# Patient Record
Sex: MALE | Race: ASIAN | NOT HISPANIC OR LATINO | Employment: OTHER | URBAN - METROPOLITAN AREA
[De-identification: names, ages, dates, MRNs, and addresses within clinical notes are randomized per-mention and may not be internally consistent; named-entity substitution may affect disease eponyms.]

---

## 2017-03-09 ENCOUNTER — OFFICE VISIT (OUTPATIENT)
Dept: FAMILY MEDICINE | Facility: CLINIC | Age: 62
End: 2017-03-09

## 2017-03-09 VITALS
HEIGHT: 60 IN | SYSTOLIC BLOOD PRESSURE: 138 MMHG | BODY MASS INDEX: 26.86 KG/M2 | WEIGHT: 136.8 LBS | OXYGEN SATURATION: 98 % | TEMPERATURE: 98 F | DIASTOLIC BLOOD PRESSURE: 78 MMHG | HEART RATE: 58 BPM

## 2017-03-09 DIAGNOSIS — Z00.00 HEALTH CARE MAINTENANCE: ICD-10-CM

## 2017-03-09 DIAGNOSIS — K21.9 GASTROESOPHAGEAL REFLUX DISEASE, ESOPHAGITIS PRESENCE NOT SPECIFIED: ICD-10-CM

## 2017-03-09 DIAGNOSIS — L29.89 XEROTIC ECZEMA: Primary | ICD-10-CM

## 2017-03-09 RX ORDER — DIPHENHYDRAMINE HCL 25 MG
25-50 TABLET ORAL
Qty: 60 TABLET | Refills: 1 | Status: SHIPPED | OUTPATIENT
Start: 2017-03-09 | End: 2017-11-10

## 2017-03-09 RX ORDER — TRIAMCINOLONE ACETONIDE 1 MG/G
OINTMENT TOPICAL
Qty: 30 G | Refills: 0 | Status: SHIPPED | OUTPATIENT
Start: 2017-03-09 | End: 2017-11-10

## 2017-03-09 RX ORDER — NICOTINE POLACRILEX 4 MG/1
20 GUM, CHEWING ORAL DAILY
Qty: 30 TABLET | Refills: 0 | Status: CANCELLED | OUTPATIENT
Start: 2017-03-09

## 2017-03-09 RX ORDER — DIAPER,BRIEF,INFANT-TODD,DISP
EACH MISCELLANEOUS
Qty: 30 G | Refills: 0 | Status: CANCELLED | OUTPATIENT
Start: 2017-03-09

## 2017-03-09 NOTE — MR AVS SNAPSHOT
After Visit Summary   3/9/2017    Saw Jagdish    MRN: [id number]           Patient Information     Date Of Birth          1955        Visit Information        Provider Department      3/9/2017 1:10 PM Yenny Rosenberg MD [facility]        Today's Diagnoses     Xerotic eczema    -  1    Gastroesophageal reflux disease, esophagitis presence not specified          Care Instructions    Take zantac every day for the next 2 weeks for acid reflux, then as needed afterward.  Use triamcinolone three times a day for 2 weeks itching on back of neck. Take benadryl at night for itching    Come back for finger lump removal, or in 2 weeks if rash hasn't improved.           Follow-ups after your visit        Who to contact     Please call your clinic at [phone] to:    Ask questions about your health    Make or cancel appointments    Discuss your medicines    Learn about your test results    Speak to your doctor   If you have compliments or concerns about an experience at your clinic, or if you wish to file a complaint, please contact [facility] Patient Relations at [phone] or email us at [email or web address]         Additional Information About Your Visit        MyChart Information     [employer] is an electronic gateway that provides easy, online access to your medical records. With Mpax, you can request a clinic appointment, read your test results, renew a prescription or communicate with your care team.     To sign up for [employer] visit the website at [email or web address]   You will be asked to enter the access code listed below, as well as some personal information. Please follow the directions to create your username and password.     Your access code is: [email or web address]  Expires: 2017  1:45 PM     Your access code will  in 90 days. If you need help or a new code, please contact your [facility] or call  "[phone] for assistance.        Care EveryWhere ID     This is your Care EveryWhere ID. This could be used by other organizations to access your [facility] medical records  [id number]        Your Vitals Were     Pulse Temperature Height Pulse Oximetry BMI (Body Mass Index)       58 98  F (36.7  C) (Oral) 4' 11.5\" (151.1 cm) 98% 27.17 kg/m2        Blood Pressure from Last 3 Encounters:   03/09/17 138/78   05/10/16 109/67   04/08/16 97/63    Weight from Last 3 Encounters:   03/09/17 136 lb 12.8 oz (62.1 kg)   05/10/16 135 lb 6.4 oz (61.4 kg)   04/08/16 138 lb (62.6 kg)              Today, you had the following     No orders found for display         Today's Medication Changes          These changes are accurate as of: 3/9/17  1:45 PM.  If you have any questions, ask your nurse or doctor.               Start taking these medicines.        Dose/Directions    diphenhydrAMINE 25 MG tablet   Commonly known as:  BENADRYL   Used for:  Xerotic eczema   Started by:  Yenny Rosenberg MD        Dose:  25-50 mg   Take 1-2 tablets (25-50 mg) by mouth nightly as needed for itching or allergies   Quantity:  60 tablet   Refills:  1       ranitidine 150 MG tablet   Commonly known as:  ZANTAC   Used for:  Gastroesophageal reflux disease, esophagitis presence not specified   Started by:  Yenny Rosenberg MD        Dose:  150 mg   Take 1 tablet (150 mg) by mouth At Bedtime   Quantity:  60 tablet   Refills:  1       triamcinolone 0.1 % ointment   Commonly known as:  KENALOG   Used for:  Xerotic eczema   Started by:  Yenny Rosenberg MD        Apply sparingly to affected area three times daily for 14 days.   Quantity:  30 g   Refills:  0            Where to get your medicines      [facility] [place], [place] - [address], [place] [address]     Phone:  [phone]     diphenhydrAMINE 25 MG tablet    ranitidine 150 MG tablet    triamcinolone 0.1 % ointment "                Primary Care Provider Office Phone # Fax #    Leida Neal [phone]       [address] [address]        Thank you!     Thank you for choosing [facility]  for your care. Our goal is always to provide you with excellent care. Hearing back from our patients is one way we can continue to improve our services. Please take a few minutes to complete the written survey that you may receive in the mail after your visit with us. Thank you!             Your Updated Medication List - Protect others around you: Learn how to safely use, store and throw away your medicines at www.disposemymeds.org.          This list is accurate as of: 3/9/17  1:45 PM.  Always use your most recent med list.                   Brand Name Dispense Instructions for use    diphenhydrAMINE 25 MG tablet    BENADRYL    60 tablet    Take 1-2 tablets (25-50 mg) by mouth nightly as needed for itching or allergies       omeprazole 20 MG tablet     30 tablet    Take 1 tablet (20 mg) by mouth daily Take 30-60 minutes before a meal.       ranitidine 150 MG tablet    ZANTAC    60 tablet    Take 1 tablet (150 mg) by mouth At Bedtime       triamcinolone 0.1 % ointment    KENALOG    30 g    Apply sparingly to affected area three times daily for 14 days.

## 2017-03-09 NOTE — PATIENT INSTRUCTIONS
Take zantac every day for the next 2 weeks for acid reflux, then as needed afterward.  Use triamcinolone three times a day for 2 weeks itching on back of neck. Take benadryl at night for itching  Labs today    Come back for finger lump removal, or in 2 weeks if rash hasn't improved.

## 2017-03-09 NOTE — PROGRESS NOTES
Preceptor attestation:  Patient seen and discussed with the resident. Assessment and plan reviewed with resident and agreed upon.  Supervising physician: Angel Garcia  [facility]

## 2017-03-09 NOTE — PROGRESS NOTES
"     SUBJECTIVE     Chief Complaint   Patient presents with     other     have itching skin on neck for a while now per pt.      other     have stomch problem and want to be back on the medication per pt.        Subjective: Saw Jagdish is a 62 year old male with GERD here for neck itchiness.    Itchy back of neck. Associates this with certain foods such as meat. Has been going for 3 years, slowly getting worse over the last 3 years. Is associated with a rash. No trauma or burn to the area. He bought OTC medicine for ringworm, this hasn't helped. No traditional medicine use. No rashes elsewhere. Does not wear necklace    Also wants a refill for stomach medicine. Was Rx omeprazole and loratadine 1 year ago for current cough, hasn't taken it in over a year. Current symptoms include irritation after eating and a sour taste in his mouth after burping. Currently no cough, except for cold weather. Currently not taking any medication. No nasal drip or congestion.     He also is noted to have a flesh-colored nodule on his L 3rd finger. States this has been present since he was a child and is sometimes irritated. He would like to get this removed.    ROS:    General: No fevers   Skin: No new rashes   CV: No chest pain   Resp: No shortness of breath     PMH/PSH, FamHx, Meds, Allergies reviewed and updated as needed.    Social History     Social History     Marital status:      Spouse name: N/A     Number of children: N/A     Years of education: N/A     Social History Main Topics     Smoking status: Never Smoker     Smokeless tobacco: Never Used     Alcohol use No     Drug use: No     Sexual activity: Not Asked     Other Topics Concern     None     Social History Narrative           OBJECTIVE     /78  Pulse 58  Temp 98  F (36.7  C) (Oral)  Ht 4' 11.5\" (151.1 cm)  Wt 136 lb 12.8 oz (62.1 kg)  SpO2 98%  BMI 27.17 kg/m2  Body mass index is 27.17 kg/(m^2).    Physical exam:  Gen: No acute distress  HEENT: No pharyngeal " erythema or post nasal drip  Skin: Hyperpigmented patch with xerosis and overlying small pustules and excoriations on the dorsal neck.. No similar rash on upper extremities. Also has 1 cm pedunculated flesh colored mass on L 3rd finger just distal to MCP that does not move with finger flexion extension  CV: Regular rate and rhythm, no murmurs/rubs/gallops  Resp: Clear to auscultation bilaterally, no crackles or wheezes  Psych: Mood and affect appropriate, mentation appears normal.    No results found for this or any previous visit (from the past 24 hour(s)).      ASSESSMENT AND PLAN     Saw Jagdish is a 62 year old male here for eczema and GERD    1. Gastroesophageal reflux disease, esophagitis presence not specified  Hasn't used GERD medicine in over 1 year. Will try H2 blocker first to see if this provides symptomatic relief.   - ranitidine (ZANTAC) 150 MG tablet; Take 1 tablet (150 mg) by mouth At Bedtime  Dispense: 60 tablet; Refill: 1    2. Xerotic eczema  No history to suggest nickel allergy. No other rashes reported or noted on limited exam. Discussed to try triamcinolone for 2 weeks and to come back if this doesn't improve symptoms, and risks of using steroid creams for prolonged times. Sx have been keeping him up at night, will additionally Rx benadryl.   - diphenhydrAMINE (BENADRYL) 25 MG tablet; Take 1-2 tablets (25-50 mg) by mouth nightly as needed for itching or allergies  Dispense: 60 tablet; Refill: 1  - triamcinolone (KENALOG) 0.1 % ointment; Apply sparingly to affected area three times daily for 14 days.  Dispense: 30 g; Refill: 0    3. Health care maintenance  Due for hepatitis C testing. Hasn't done this in the past.  [facility]    4. L 3rd finger nodule  Small skin growth, does not appear to be attached to any tendon or underlying finger structures. Would likely be an easy removal perhaps requiring a suture for bleeding. Encouraged him to schedule a follow up appointment at  his convenience for removal.     RTC in 2 weeks for follow up of neck itchiness or removal of finger nodule, or sooner if develops new or worsening symptoms.      Yenny Rosenberg MD, PGY-1  I precepted today with Angel Garcia MD.

## 2017-03-09 NOTE — LETTER
March 13, 2017      Saw Jagdish  [address] [place] [address]        Dear Saw,    Please see below for your test results.    Resulted Orders   Hepatitis C Antibody ([employer])   Result Value Ref Range    Hepatitis C Antibody Screen Negative Negative    Narrative    Test performed by:  [facility]  [address] [place] [address]     Lisbet Saw Jagdish,  Your blood test checking for Hepatitis C was normal. You do NOT have hepatitis C.   Make an appointment whenever you want for a removal for the lump on your finger.  Dr. Yenny Rosenberg

## 2017-03-09 NOTE — NURSING NOTE
name: Corby Khoury  Language: Meaghan  Agency: [facility] [place], operated [facility]  Phone number: [phone]

## 2017-03-10 LAB — HCV AB SER QL: NEGATIVE

## 2017-03-27 ENCOUNTER — OFFICE VISIT (OUTPATIENT)
Dept: FAMILY MEDICINE | Facility: CLINIC | Age: 62
End: 2017-03-27

## 2017-03-27 VITALS
HEIGHT: 60 IN | SYSTOLIC BLOOD PRESSURE: 107 MMHG | HEART RATE: 63 BPM | WEIGHT: 135.6 LBS | TEMPERATURE: 98.6 F | BODY MASS INDEX: 26.62 KG/M2 | OXYGEN SATURATION: 97 % | DIASTOLIC BLOOD PRESSURE: 69 MMHG

## 2017-03-27 DIAGNOSIS — L91.8 SKIN TAG: Primary | ICD-10-CM

## 2017-03-27 NOTE — NURSING NOTE
name: Corby Khoury  Language: Meaghan  Agency: [facility]  Phone number: [phone]

## 2017-03-27 NOTE — PROGRESS NOTES
SUBJECTIVE:   Manpreet Dubon is a 62 year old male who presents today for skin tag excision. He has 1 skin tag on the dorsum of his left 3rd digit which has been present since birth that he would like removed. This was previously discussed during visit with Dr. Rosenberg. Its become irritated by rubbing from clothing at times      OBJECTIVE:   Exam shows a 1 skin tag on the dorsum of the base of the left 3rd digit. There is not evidence of irritation with surrounding redness at this time. It is 0.2 cm in diameter at the base.      Discussed with Manpreet regarding technique, risk of infection, and scarring. Consent was signed and time-out performed prior to procedure. Alcohol was used for cleansing. 1 cc of Lidocaine without epinephrine was used for anesthesia. Skin tags removed by shave excision. Bandage applied with steri-strips with minimal bleeding. Saw tolerated procedure well. No complications.    ASSESSMENT:   1 skin tag excision.    PLAN:   Specimens not submitted to pathology. Wound care instructions given. Follow up as needed.      Sofiya Irvin MD - PGY-3  [facility]    Patient seen and plan of care discussed with preceptor, Dr. Frances who was present for entire procedure

## 2017-03-27 NOTE — PROGRESS NOTES
Preceptor attestation:  Patient seen and discussed with the resident. I examined patient, was present for and supervised the entire procedure.  Assessment and plan reviewed with resident and agreed upon.  Supervising physician: Gregoria Frances  [facility]

## 2017-03-27 NOTE — MR AVS SNAPSHOT
"              After Visit Summary   3/27/2017    Saw Jagdish    MRN: [id number]           Patient Information     Date Of Birth          1955        Visit Information        Provider Department      3/27/2017 10:00 AM Sofiya Irvin MD [facility]        Today's Diagnoses     Skin tag    -  1       Follow-ups after your visit        Who to contact     Please call your clinic at [phone] to:    Ask questions about your health    Make or cancel appointments    Discuss your medicines    Learn about your test results    Speak to your doctor   If you have compliments or concerns about an experience at your clinic, or if you wish to file a complaint, please contact [facility] Patient Relations at [phone] or email us at [email or web address]         Additional Information About Your Visit        MyChart Information     [employer] is an electronic gateway that provides easy, online access to your medical records. With [employer], you can request a clinic appointment, read your test results, renew a prescription or communicate with your care team.     To sign up for [employer] visit the website at [email or web address]   You will be asked to enter the access code listed below, as well as some personal information. Please follow the directions to create your username and password.     Your access code is: [email or web address]  Expires: 2017  2:45 PM     Your access code will  in 90 days. If you need help or a new code, please contact your [facility] or call [phone] for assistance.        Care EveryWhere ID     This is your Care EveryWhere ID. This could be used by other organizations to access your [facility] medical records  [id number]        Your Vitals Were     Pulse Temperature Height Pulse Oximetry BMI (Body Mass Index)       63 98.6  F (37  C) 4' 11.45\" (151 cm) 97% 26.98 kg/m2        Blood Pressure from Last 3 Encounters: "   03/27/17 107/69   03/09/17 138/78   05/10/16 109/67    Weight from Last 3 Encounters:   03/27/17 135 lb 9.6 oz (61.5 kg)   03/09/17 136 lb 12.8 oz (62.1 kg)   05/10/16 135 lb 6.4 oz (61.4 kg)              We Performed the Following     Removal of up to 15 skin tags        Primary Care Provider Office Phone # Fax #    Leida Neal [phone]       [address] [address]        Thank you!     Thank you for choosing [facility]  for your care. Our goal is always to provide you with excellent care. Hearing back from our patients is one way we can continue to improve our services. Please take a few minutes to complete the written survey that you may receive in the mail after your visit with us. Thank you!             Your Updated Medication List - Protect others around you: Learn how to safely use, store and throw away your medicines at www.disposemymeds.org.          This list is accurate as of: 3/27/17  2:41 PM.  Always use your most recent med list.                   Brand Name Dispense Instructions for use    diphenhydrAMINE 25 MG tablet    BENADRYL    60 tablet    Take 1-2 tablets (25-50 mg) by mouth nightly as needed for itching or allergies       omeprazole 20 MG tablet     30 tablet    Take 1 tablet (20 mg) by mouth daily Take 30-60 minutes before a meal.       ranitidine 150 MG tablet    ZANTAC    60 tablet    Take 1 tablet (150 mg) by mouth At Bedtime       triamcinolone 0.1 % ointment    KENALOG    30 g    Apply sparingly to affected area three times daily for 14 days.

## 2017-06-26 DIAGNOSIS — K21.9 GASTROESOPHAGEAL REFLUX DISEASE, ESOPHAGITIS PRESENCE NOT SPECIFIED: ICD-10-CM

## 2017-08-17 ENCOUNTER — OFFICE VISIT (OUTPATIENT)
Dept: FAMILY MEDICINE | Facility: CLINIC | Age: 62
End: 2017-08-17

## 2017-08-17 VITALS
DIASTOLIC BLOOD PRESSURE: 78 MMHG | WEIGHT: 134.6 LBS | HEART RATE: 65 BPM | SYSTOLIC BLOOD PRESSURE: 116 MMHG | BODY MASS INDEX: 26.42 KG/M2 | TEMPERATURE: 97.5 F | HEIGHT: 60 IN

## 2017-08-17 DIAGNOSIS — G89.29 CHRONIC BILATERAL LOW BACK PAIN WITH BILATERAL SCIATICA: Primary | ICD-10-CM

## 2017-08-17 DIAGNOSIS — M54.41 CHRONIC BILATERAL LOW BACK PAIN WITH BILATERAL SCIATICA: Primary | ICD-10-CM

## 2017-08-17 DIAGNOSIS — M54.42 CHRONIC BILATERAL LOW BACK PAIN WITH BILATERAL SCIATICA: Primary | ICD-10-CM

## 2017-08-17 RX ORDER — GABAPENTIN 300 MG/1
300 CAPSULE ORAL 2 TIMES DAILY
Qty: 60 CAPSULE | Refills: 1 | Status: SHIPPED | OUTPATIENT
Start: 2017-08-17 | End: 2017-09-11

## 2017-08-17 ASSESSMENT — PATIENT HEALTH QUESTIONNAIRE - PHQ9: SUM OF ALL RESPONSES TO PHQ QUESTIONS 1-9: 3

## 2017-08-17 NOTE — PATIENT INSTRUCTIONS
"Long standing lower back pain.  Both sides.  Can radiate down legs (both).  Worse standing or sitting for longer while.  \"Antunez\".  Associated with Headache  Stomach senativities.    1) Physical Therapy    2) Tylenol, as needed    3) Restart gabapentin.  Will help over 1-2 weeks.  Decreases \"Burn\"  Just take at night if morning dose making you tired      Recheck in 4-6 weeks    Your referral [facility] been scheduled for:    [facility]  [address],  [address],   [place] [address]  Physical Therapy  Date: Wednesday August 30 2017   Time: 1:45 PM     If you have any questions or need to reschedule please call the number listed above.  Any other concerns please call our referral coordinator at [phone]    Le  Care Coordinator     Gave to basim ontiveros   "

## 2017-08-17 NOTE — PROGRESS NOTES
"       SUBJECTIVE       Saw Jagdish is a 62 year old  male with a PMH significant for:     Patient Active Problem List   Diagnosis     GERD (gastroesophageal reflux disease)     Back pain     Urinary frequency     Beta-thalassemia (H)     Neuropathy (H)     He presents with Long standing lower back pain.  Both sides.  Can radiate down legs (both).  Worse standing or sitting for longer while.  \"Antunez\".  Associated with Headache  Stomach senativities. .    PMH, Medications and Allergies were reviewed and updated as needed.        REVIEW OF SYSTEMS     General: No fevers, chills, sweats, unexplained weight loss  Head: Some headache, when back hurts  Neck: No swallowing problems   CV: No chest pain or palpitations  Resp: No shortness of breath.  No cough. No hemoptysis.  GI: No constipation, diarrhea, or blood in stool.  no nausea or vomiting  : No pain passing urine or urinary frequency            OBJECTIVE     Vitals:    08/17/17 0826   BP: 116/78   Pulse: 65   Temp: 97.5  F (36.4  C)   TempSrc: Oral   Weight: 134 lb 9.6 oz (61.1 kg)   Height: 4' 11.5\" (151.1 cm)     Body mass index is 26.73 kg/(m^2).    Gen:  Well nourished and in NAD  HEENT: PERRLA; TMs normal color and landmarks; nasopharynx pink and moist; oropharynx pink and moist  Neck: supple without lymphadenopathy  CV:  RRR  - no murmurs, rubs, or gallups,   Pulm:  CTAB, no wheezes/rales/rhonchi, good air entry   ABD: soft, nontender, no masses, no rebound, BS intact throughout  Back: paraspinal tenderness and mild spasm  Extrem: 2+ symmetric patella reflexes. Dorsal/plantar foot/toe strength normal.  Psych: Euthymic     No results found for this or any previous visit (from the past 24 hour(s)).        ASSESSMENT AND PLAN     Manpreet was seen today for back pain, headache and musculoskeletal problem.    Diagnoses and all orders for this visit:    Chronic bilateral low back pain with bilateral sciatica  -     gabapentin (NEURONTIN) 300 MG capsule; Take 1 capsule " "(300 mg) by mouth 2 times daily  -     PHYSICAL THERAPY REFERRAL        Patient Instructions   Long standing lower back pain.  Both sides.  Can radiate down legs (both).  Worse standing or sitting for longer while.  \"Antunez\".  Associated with Headache  Stomach senativities.    1) Physical Therapy    2) Tylenol, as needed    3) Restart gabapentin.  Will help over 1-2 weeks.  Decreases \"Burn\"  Just take at night if morning dose making you tired      Recheck in 4-6 weeks      Total of 30 minutes was spent in face to face contact with patient with > 50% in counseling and coordination of care.  Options for treatment and/or follow-up care were reviewed with the patient. Saw Jagdish was engaged and actively involved in the decision making process. He verbalized understanding of the options discussed and was satisfied with the final plan.    RTC in 6 weeks for follow up of pain or sooner if develops new or worsening symptoms.    Riley Purdy MD        "

## 2017-08-17 NOTE — MR AVS SNAPSHOT
"              After Visit Summary   8/17/2017    Saw Jagdish    MRN: [id number]           Patient Information     Date Of Birth          1955        Visit Information        Provider Department      8/17/2017 8:20 AM Riley Purdy MD [facility]        Today's Diagnoses     Chronic bilateral low back pain with bilateral sciatica    -  1      Care Instructions    Long standing lower back pain.  Both sides.  Can radiate down legs (both).  Worse standing or sitting for longer while.  \"Antunez\".  Associated with Headache  Stomach senativities.    1) Physical Therapy    2) Tylenol, as needed    3) Restart gabapentin.  Will help over 1-2 weeks.  Decreases \"Burn\"  Just take at night if morning dose making you tired      Recheck in 4-6 weeks          Follow-ups after your visit        Additional Services     PHYSICAL THERAPY REFERRAL       PT/OT REFERRAL  Saw Jagdish  1955  Phone #: [phone] (home)    needed: Yes  Language: Meaghan    PT/OT  Facility:   Any site    History: Long standing lower back pain.  Both sides.  Can radiate down legs (both).  Worse standing or sitting for longer while.  \"Antunez\".  Associated with Headache  Stomach senativities    Precautions/Contraindications: None    Imaging Studies: None    Surgical Procedure/Test Results: None    Treatment Goals:   Increase: Flexibility and Strength    Prognosis: good    Visits: Up to 12    Evaluate: Evaluate and treat    Plan: Manual Therapy and Flexibility Exercise    {PV IONTO/PHONOPHORESIS:[id number]                  Who to contact     Please call your clinic at [phone] to:    Ask questions about your health    Make or cancel appointments    Discuss your medicines    Learn about your test results    Speak to your doctor   If you have compliments or concerns about an experience at your clinic, or if you wish to file a complaint, please contact [facility] Patient Relations at [phone] or email us at " "[email or web address]         Additional Information About Your Visit        [employer] Information     Vapps is an electronic gateway that provides easy, online access to your medical records. With Vapps, you can request a clinic appointment, read your test results, renew a prescription or communicate with your care team.     To sign up for Vapps visit the website at [email or web address]   You will be asked to enter the access code listed below, as well as some personal information. Please follow the directions to create your username and password.     Your access code is: [email or web address]  Expires: 11/15/2017  8:55 AM     Your access code will  in 90 days. If you need help or a new code, please contact your [facility] or call [phone] for assistance.        Care EveryWhere ID     This is your Care EveryWhere ID. This could be used by other organizations to access your [facility] medical records  [id number]        Your Vitals Were     Pulse Temperature Height BMI (Body Mass Index)          65 97.5  F (36.4  C) (Oral) 4' 11.5\" (151.1 cm) 26.73 kg/m2         Blood Pressure from Last 3 Encounters:   17 116/78   17 107/69   17 138/78    Weight from Last 3 Encounters:   17 134 lb 9.6 oz (61.1 kg)   17 135 lb 9.6 oz (61.5 kg)   17 136 lb 12.8 oz (62.1 kg)              We Performed the Following     PHYSICAL THERAPY REFERRAL          Today's Medication Changes          These changes are accurate as of: 17  8:55 AM.  If you have any questions, ask your nurse or doctor.               Start taking these medicines.        Dose/Directions    gabapentin 300 MG capsule   Commonly known as:  NEURONTIN   Used for:  Chronic bilateral low back pain with bilateral sciatica   Started by:  Riley Purdy MD        Dose:  300 mg   Take 1 capsule (300 mg) by mouth 2 times daily   Quantity:  60 capsule   Refills:  1            Where to get " your medicines      These medications were sent to [facility] - [place], [place] - [address]  [address], [place] [address]     Phone:  [phone]     gabapentin 300 MG capsule                Primary Care Provider Office Phone # Fax #    Leida Neal [phone]       [facility] [address] [address]        Equal Access to Services     QUANG TOSCANO : Hadii aad ku hadasho Soomaali, waaxda luqadaha, qaybta kaalmada adeegyada, waxay idiin hayaan adeeg nunumeagannely laambika villa. So [facility] [phone].    ATENCIÓN: Si dusty demarco, tiene a matias disposición servicios gratuitos de asistencia lingüística. Corrina al [phone].    We comply with applicable federal civil rights laws and [place] laws. We do not discriminate on the basis of race, color, national origin, age, disability sex, sexual orientation or gender identity.            Thank you!     Thank you for choosing [facility]  for your care. Our goal is always to provide you with excellent care. Hearing back from our patients is one way we can continue to improve our services. Please take a few minutes to complete the written survey that you may receive in the mail after your visit with us. Thank you!             Your Updated Medication List - Protect others around you: Learn how to safely use, store and throw away your medicines at www.disposemymeds.org.          This list is accurate as of: 8/17/17  8:55 AM.  Always use your most recent med list.                   Brand Name Dispense Instructions for use Diagnosis    diphenhydrAMINE 25 MG tablet    BENADRYL    60 tablet    Take 1-2 tablets (25-50 mg) by mouth nightly as needed for itching or allergies    Xerotic eczema       gabapentin 300 MG capsule    NEURONTIN    60 capsule    Take 1 capsule (300 mg) by mouth 2 times daily    Chronic bilateral low back pain with bilateral sciatica       omeprazole 20 MG tablet     30 tablet    Take 1  tablet (20 mg) by mouth daily Take 30-60 minutes before a meal.    Gastroesophageal reflux disease, esophagitis presence not specified       ranitidine 150 MG tablet    ZANTAC    60 tablet    Take 1 tablet (150 mg) by mouth At Bedtime    Gastroesophageal reflux disease, esophagitis presence not specified       triamcinolone 0.1 % ointment    KENALOG    30 g    Apply sparingly to affected area three times daily for 14 days.    Xerotic eczema

## 2017-09-11 ENCOUNTER — OFFICE VISIT (OUTPATIENT)
Dept: FAMILY MEDICINE | Facility: CLINIC | Age: 62
End: 2017-09-11

## 2017-09-11 VITALS
SYSTOLIC BLOOD PRESSURE: 96 MMHG | BODY MASS INDEX: 26.9 KG/M2 | TEMPERATURE: 97.4 F | DIASTOLIC BLOOD PRESSURE: 59 MMHG | WEIGHT: 137 LBS | HEIGHT: 60 IN | HEART RATE: 66 BPM

## 2017-09-11 DIAGNOSIS — M54.41 CHRONIC BILATERAL LOW BACK PAIN WITH BILATERAL SCIATICA: Primary | ICD-10-CM

## 2017-09-11 DIAGNOSIS — M54.42 CHRONIC BILATERAL LOW BACK PAIN WITH BILATERAL SCIATICA: Primary | ICD-10-CM

## 2017-09-11 DIAGNOSIS — G89.29 CHRONIC BILATERAL LOW BACK PAIN WITH BILATERAL SCIATICA: Primary | ICD-10-CM

## 2017-09-11 RX ORDER — GABAPENTIN 300 MG/1
600 CAPSULE ORAL AT BEDTIME
Qty: 60 CAPSULE | Refills: 11 | Status: SHIPPED | OUTPATIENT
Start: 2017-09-11 | End: 2019-06-17

## 2017-09-11 NOTE — NURSING NOTE
name: Corby Khoury  Language: Meaghan  Agency: [facility] [place]  Phone number: [phone]

## 2017-09-12 NOTE — PROGRESS NOTES
"       SUBJECTIVE       Saw Jagdish is a 62 year old  male with a PMH significant for:     Patient Active Problem List   Diagnosis     GERD (gastroesophageal reflux disease)     Back pain     Urinary frequency     Beta-thalassemia (H)     Neuropathy (H)     He presents with Long standing lower back pain.  Both sides.  Can radiate down legs (both).  Worse standing or sitting for longer while.  \"Antunez\".   Doing somewhat better.  Less burning.  Tired though.    PMH, Medications and Allergies were reviewed and updated as needed.        REVIEW OF SYSTEMS     General: No fevers, chills, sweats, unexplained weight loss  Head: Some headache, when back hurts  Neck: No swallowing problems   CV: No chest pain or palpitations  Resp: No shortness of breath.  No cough. No hemoptysis.  GI: No constipation, diarrhea, or blood in stool.  no nausea or vomiting  : No pain passing urine or urinary frequency            OBJECTIVE     Vitals:    09/11/17 1115   BP: 96/59   Pulse: 66   Temp: 97.4  F (36.3  C)   TempSrc: Oral   Weight: 137 lb (62.1 kg)   Height: 4' 11.75\" (151.8 cm)     Body mass index is 26.98 kg/(m^2).    Gen:  Well nourished and in NAD  HEENT: PERRLA; TMs normal color and landmarks; nasopharynx pink and moist; oropharynx pink and moist  Neck: supple without lymphadenopathy  CV:  RRR  - no murmurs, rubs, or gallups,   Pulm:  CTAB, no wheezes/rales/rhonchi, good air entry   ABD: soft, nontender, no masses, no rebound, BS intact throughout  Back: paraspinal tenderness and mild spasm  Extrem: 2+ symmetric patella reflexes. Dorsal/plantar foot/toe strength normal.  Psych: Euthymic     No results found for this or any previous visit (from the past 24 hour(s)).        ASSESSMENT AND PLAN     Manpreet was seen today for recheck.    Diagnoses and all orders for this visit:    Chronic bilateral low back pain with bilateral sciatica  -     gabapentin (NEURONTIN) 300 MG capsule; Take 2 capsules (600 mg) by mouth At Bedtime        Patient " Instructions   Doing somewhat better.  Less burning.  Tired though.    1) Take both capsules together at bedtime      Total of 15 minutes was spent in face to face contact with patient with > 50% in counseling and coordination of care.  Options for treatment and/or follow-up care were reviewed with the patient. Saw Jagdish was engaged and actively involved in the decision making process. He verbalized understanding of the options discussed and was satisfied with the final plan.    RTC in 3 months  Riley Purdy MD

## 2017-10-31 DIAGNOSIS — K21.9 GASTROESOPHAGEAL REFLUX DISEASE, ESOPHAGITIS PRESENCE NOT SPECIFIED: ICD-10-CM

## 2017-11-10 ENCOUNTER — OFFICE VISIT (OUTPATIENT)
Dept: FAMILY MEDICINE | Facility: CLINIC | Age: 62
End: 2017-11-10

## 2017-11-10 VITALS
DIASTOLIC BLOOD PRESSURE: 66 MMHG | BODY MASS INDEX: 26.97 KG/M2 | TEMPERATURE: 97.8 F | WEIGHT: 137.4 LBS | OXYGEN SATURATION: 98 % | SYSTOLIC BLOOD PRESSURE: 108 MMHG | HEIGHT: 60 IN | HEART RATE: 62 BPM

## 2017-11-10 DIAGNOSIS — G89.29 CHRONIC BILATERAL LOW BACK PAIN WITH BILATERAL SCIATICA: Primary | ICD-10-CM

## 2017-11-10 DIAGNOSIS — Z23 NEED FOR VACCINATION: ICD-10-CM

## 2017-11-10 DIAGNOSIS — M54.42 CHRONIC BILATERAL LOW BACK PAIN WITH BILATERAL SCIATICA: Primary | ICD-10-CM

## 2017-11-10 DIAGNOSIS — K21.9 GASTROESOPHAGEAL REFLUX DISEASE, ESOPHAGITIS PRESENCE NOT SPECIFIED: ICD-10-CM

## 2017-11-10 DIAGNOSIS — M54.41 CHRONIC BILATERAL LOW BACK PAIN WITH BILATERAL SCIATICA: Primary | ICD-10-CM

## 2017-11-10 RX ORDER — NICOTINE POLACRILEX 4 MG/1
20 GUM, CHEWING ORAL DAILY
Qty: 90 TABLET | Refills: 3 | Status: SHIPPED | OUTPATIENT
Start: 2017-11-10 | End: 2019-07-29

## 2017-11-10 NOTE — PATIENT INSTRUCTIONS
Reports sleep and appetite good.  Pains occur on/off.  Can shoot down either leg.    1) Flu shot  2) Continue gabapentin at bedtime    Recheck in 3 months

## 2017-11-10 NOTE — MR AVS SNAPSHOT
After Visit Summary   11/10/2017    Saw Jagdish    MRN: [id number]           Patient Information     Date Of Birth          1955        Visit Information        Provider Department      11/10/2017 11:00 AM Riley Purdy MD [facility]        Today's Diagnoses     Chronic bilateral low back pain with bilateral sciatica    -  1    Gastroesophageal reflux disease, esophagitis presence not specified          Care Instructions    Reports sleep and appetite good.  Pains occur on/off.  Can shoot down either leg.    1) Flu shot  2) Continue gabapentin at bedtime    Recheck in 3 months          Follow-ups after your visit        Who to contact     Please call your clinic at [phone] to:    Ask questions about your health    Make or cancel appointments    Discuss your medicines    Learn about your test results    Speak to your doctor   If you have compliments or concerns about an experience at your clinic, or if you wish to file a complaint, please contact [facility] Patient Relations at [phone] or email us at [email or web address]         Additional Information About Your Visit        MyChart Information     [email or web address].com is an electronic gateway that provides easy, online access to your medical records. With [email or web address].com, you can request a clinic appointment, read your test results, renew a prescription or communicate with your care team.     To sign up for [email or web address] visit the website at [email or web address]   You will be asked to enter the access code listed below, as well as some personal information. Please follow the directions to create your username and password.     Your access code is: [email or web address]  Expires: 11/15/2017  7:55 AM     Your access code will  in 90 days. If you need help or a new code, please contact your [facility] or call [phone] for assistance.        Care EveryWhere ID     This is your Care EveryWhere  "ID. This could be used by other organizations to access your [place] medical records  [id number]        Your Vitals Were     Pulse Temperature Height Pulse Oximetry BMI (Body Mass Index)       62 97.8  F (36.6  C) (Oral) 4' 11.25\" (150.5 cm) 98% 27.52 kg/m2        Blood Pressure from Last 3 Encounters:   11/10/17 108/66   09/11/17 96/59   08/17/17 116/78    Weight from Last 3 Encounters:   11/10/17 137 lb 6.4 oz (62.3 kg)   09/11/17 137 lb (62.1 kg)   08/17/17 134 lb 9.6 oz (61.1 kg)              Today, you had the following     No orders found for display         Today's Medication Changes          These changes are accurate as of: 11/10/17 11:18 AM.  If you have any questions, ask your nurse or doctor.               Stop taking these medicines if you haven't already. Please contact your care team if you have questions.     diphenhydrAMINE 25 MG tablet   Commonly known as:  BENADRYL   Stopped by:  Riley Purdy MD           triamcinolone 0.1 % ointment   Commonly known as:  KENALOG   Stopped by:  Riley Purdy MD                    Primary Care Provider Office Phone # Fax #    Leida Durant DO [place] [phone]       [place] [address]        Equal Access to Services     QUANG TOSCANO AH: Hadii patricia tran hadasho Sogordyali, waaxda luqadaha, qaybta kaalmada adeegyada, tran villa. So [facility] [phone].    ATENCIÓN: Si habla español, tiene a matias disposición servicios gratuitos de asistencia lingüística. Corrina al [phone].    We comply with applicable federal civil rights laws and [place] laws. We do not discriminate on the basis of race, color, national origin, age, disability, sex, sexual orientation, or gender identity.            Thank you!     Thank you for choosing [facility]  for your care. Our goal is always to provide you with excellent care. Hearing back from our patients is one way we can continue to improve our " services. Please take a few minutes to complete the written survey that you may receive in the mail after your visit with us. Thank you!             Your Updated Medication List - Protect others around you: Learn how to safely use, store and throw away your medicines at www.disposemymeds.org.          This list is accurate as of: 11/10/17 11:18 AM.  Always use your most recent med list.                   Brand Name Dispense Instructions for use Diagnosis    gabapentin 300 MG capsule    NEURONTIN    60 capsule    Take 2 capsules (600 mg) by mouth At Bedtime    Chronic bilateral low back pain with bilateral sciatica       omeprazole 20 MG tablet     30 tablet    Take 1 tablet (20 mg) by mouth daily Take 30-60 minutes before a meal.    Gastroesophageal reflux disease, esophagitis presence not specified       ranitidine 150 MG tablet    ZANTAC    60 tablet    Take 1 tablet (150 mg) by mouth At Bedtime    Gastroesophageal reflux disease, esophagitis presence not specified

## 2017-11-10 NOTE — NURSING NOTE
" name: Corby Khoury  Language: Meaghan  Agency: [employer]  Phone number: [phone]      Injectable Influenza Immunization Documentation    1.  Has the patient received the information for the injectable influenza vaccine? YES     2. Is the patient 6 months of age or older? YES     3. Does the patient have any of the following contraindications?         Severe allergy to eggs? No     Severe allergic reaction to previous influenza vaccines? No   Severe allergy to latex? No       History of Guillain-Dameron syndrome? No     Currently have a temperature greater than 100.4F? No        4.  Severely egg allergic patients should have flu vaccine eligibility assessed by an MD, RN, or pharmacist, and those who received flu vaccine should be observed for 15 min by an MD, RN, Pharmacist, Medical Technician, or member of clinic staff.\": YES    5. Latex-allergic patients should be given latex-free influenza vaccine Yes. Please reference the Vaccine latex table to determine if your clinic s product is latex-containing.       Vaccination given by November Paw, RMA                                     "

## 2017-11-10 NOTE — PROGRESS NOTES
"       SUBJECTIVE       Saw Jagdish is a 62 year old  male with a PMH significant for:     Patient Active Problem List   Diagnosis     GERD (gastroesophageal reflux disease)     Back pain     Urinary frequency     Beta-thalassemia (H)     Neuropathy     He presents with Long standing lower back pain.  Both sides.  Can radiate down legs (both).  Worse standing or sitting for longer while.  \"Antunez\".   Doing  better.  Less burning.    Reports sleep and appetite good.    PMH, Medications and Allergies were reviewed and updated as needed.        REVIEW OF SYSTEMS     General: No fevers, chills, sweats, unexplained weight loss  Head: Some headache, when back hurts  Neck: No swallowing problems   CV: No chest pain or palpitations  Resp: No shortness of breath.  No cough. No hemoptysis.  GI: No constipation, diarrhea, or blood in stool.  no nausea or vomiting  : No pain passing urine or urinary frequency            OBJECTIVE     Vitals:    11/10/17 1055   BP: 108/66   Pulse: 62   Temp: 97.8  F (36.6  C)   TempSrc: Oral   SpO2: 98%   Weight: 137 lb 6.4 oz (62.3 kg)   Height: 4' 11.25\" (150.5 cm)     Body mass index is 27.52 kg/(m^2).    Gen:  Well nourished and in NAD  HEENT: PERRLA; TMs normal color and landmarks; nasopharynx pink and moist; oropharynx pink and moist  Neck: supple without lymphadenopathy  CV:  RRR  - no murmurs, rubs, or gallups,   Pulm:  CTAB, no wheezes/rales/rhonchi, good air entry   ABD: soft, nontender, no masses, no rebound, BS intact throughout  Back: paraspinal tenderness and mild spasm  Extrem: 2+ symmetric patella reflexes. Dorsal/plantar foot/toe strength normal.  Psych: Euthymic     No results found for this or any previous visit (from the past 24 hour(s)).        ASSESSMENT AND PLAN     Manpreet was seen today for recheck and other.    Diagnoses and all orders for this visit:    Chronic bilateral low back pain with bilateral sciatica    Gastroesophageal reflux disease, esophagitis presence not " specified        Patient Instructions   Reports sleep and appetite good.  Pains occur on/off.  Can shoot down either leg.    1) Flu shot  2) Continue gabapentin at bedtime    Recheck in 3 months      Total of 15 minutes was spent in face to face contact with patient with > 50% in counseling and coordination of care.  Options for treatment and/or follow-up care were reviewed with the patient. Saw Jagdish was engaged and actively involved in the decision making process. He verbalized understanding of the options discussed and was satisfied with the final plan.    RTC in 3 months  Riley Purdy MD

## 2018-03-16 ENCOUNTER — OFFICE VISIT (OUTPATIENT)
Dept: FAMILY MEDICINE | Facility: CLINIC | Age: 63
End: 2018-03-16
Payer: COMMERCIAL

## 2018-03-16 VITALS
HEIGHT: 60 IN | BODY MASS INDEX: 26.46 KG/M2 | TEMPERATURE: 97.3 F | DIASTOLIC BLOOD PRESSURE: 69 MMHG | RESPIRATION RATE: 20 BRPM | HEART RATE: 63 BPM | SYSTOLIC BLOOD PRESSURE: 108 MMHG | WEIGHT: 134.8 LBS | OXYGEN SATURATION: 98 %

## 2018-03-16 DIAGNOSIS — B36.0 TINEA VERSICOLOR: ICD-10-CM

## 2018-03-16 DIAGNOSIS — L29.89 XEROTIC ECZEMA: Primary | ICD-10-CM

## 2018-03-16 RX ORDER — PRENATAL VIT 91/IRON/FOLIC/DHA 28-975-200
COMBINATION PACKAGE (EA) ORAL 2 TIMES DAILY
Qty: 24 G | Refills: 0 | Status: SHIPPED | OUTPATIENT
Start: 2018-03-16 | End: 2019-07-03

## 2018-03-16 RX ORDER — TRIAMCINOLONE ACETONIDE 1 MG/G
CREAM TOPICAL
Qty: 30 G | Refills: 0 | Status: SHIPPED | OUTPATIENT
Start: 2018-03-16 | End: 2019-07-03

## 2018-03-16 NOTE — PROGRESS NOTES
Preceptor attestation:  Patient seen and discussed with the resident. Assessment and plan reviewed with resident and agreed upon.  Supervising physician: Chanel Atkinson  [facility]

## 2018-03-16 NOTE — PROGRESS NOTES
"  ASSESSMENT AND PLAN      Saw was seen today for derm problem.    Diagnoses and all orders for this visit:    Xerotic eczema- Hyperpigmentation in the neck fold, with white pustules. Improved in the past with triamcinolone, will trial this again at this time.   -     triamcinolone (KENALOG) 0.1 % cream; Apply sparingly to neck three times daily for 14 days.    Tinea versicolor- Patient having flat hypopigmented areas on the back without scale. No erythema noted. Hypopigmented areas are not confluent. Most likely tinea versicolor, will trial terbinafine for 1 week. If not improved/improving, will send patient to dermatology for biopsy.   -     terbinafine (LAMISIL) 1 % cream; Apply topically 2 times daily Apply to affected areas on back and abdomen.      RTC in 2 weeks for follow up of rash or sooner if develops new or worsening symptoms.    Blayne Yoon MD PGY2  [facility]                SUBJECTIVE       Saw Jagdish is a 63 year old  male with a PMH significant for:     Patient Active Problem List   Diagnosis     GERD (gastroesophageal reflux disease)     Back pain     Urinary frequency     Beta-thalassemia (H)     Neuropathy     He presents for evaluation of a rash. Rash has been present for about a year. Located on neck, bilateral sides. Very pruritic. Was prescribed triamcinolone cream in the past, noted improvement, but \"ran out a long time ago.\" Has been gradually worsening for the last few months, but in the last 2-3 weeks noticing significant worsening in pruritis.  Shower and warm water previously helped, but not over the last 2-3 weeks.     No contacts with similar rashes. No new soaps, shampoos, laundry detergents. No pets.     No rhinorrhea, cough, sob, chest pain, diarrhea or constipation.     No tobacco, alcohol, or illicit drug use.     PMH, Medications and Allergies were reviewed and updated as needed.          OBJECTIVE     Vitals:    03/16/18 1408   BP: 108/69   BP Location: Left arm "   Patient Position: Sitting   Cuff Size: Adult Regular   Pulse: 63   Resp: 20   Temp: 97.3  F (36.3  C)   TempSrc: Oral   SpO2: 98%   Weight: 134 lb 12.8 oz (61.1 kg)   Height: 5' (152.4 cm)     Body mass index is 26.33 kg/(m^2).    Constitutional: Awake, alert, cooperative, no apparent distress, and appears stated age.  Lungs: No increased work of breathing, good air exchange, clear to auscultation bilaterally, no crackles or wheezing.  Cardiovascular: Regular rate and rhythm, normal S1 and S2, no S3 or S4, and no murmur noted.  Skin: Hyperpigmentation in the neck fold, with white pustules. Patient having flat hypopigmented areas on the back without scale. No erythema noted. Hypopigmented areas are not confluent. These spots are also noted on the abdomen.    No results found for this or any previous visit (from the past 24 hour(s)).

## 2018-03-16 NOTE — MR AVS SNAPSHOT
After Visit Summary   3/16/2018    Saw Jagdish    MRN: [id number]           Patient Information     Date Of Birth          1955        Visit Information        Provider Department      3/16/2018 2:10 PM Blayne Yoon MD [facility]        Today's Diagnoses     Xerotic eczema    -  1    Tinea versicolor          Care Instructions    -If rash and itching not improved in 1.5 weeks, please follow-up in clinic.           Follow-ups after your visit        Who to contact     Please call your clinic at [phone] to:    Ask questions about your health    Make or cancel appointments    Discuss your medicines    Learn about your test results    Speak to your doctor            Additional Information About Your Visit        MyChart Information     Snaptuhart is an electronic gateway that provides easy, online access to your medical records. With [employer], you can request a clinic appointment, read your test results, renew a prescription or communicate with your care team.     To sign up for Mahoot Gamest visit the website at [email or web address]   You will be asked to enter the access code listed below, as well as some personal information. Please follow the directions to create your username and password.     Your access code is: [email or web address]  Expires: 2018  2:36 PM     Your access code will  in 90 days. If you need help or a new code, please contact your [facility] or call [phone] for assistance.        Care EveryWhere ID     This is your Care EveryWhere ID. This could be used by other organizations to access your [facility] medical records  [id number]        Your Vitals Were     Pulse Temperature Respirations Height Pulse Oximetry BMI (Body Mass Index)    63 97.3  F (36.3  C) (Oral) 20 5' (152.4 cm) 98% 26.33 kg/m2       Blood Pressure from Last 3 Encounters:   18 108/69   11/10/17 108/66   17 96/59    Weight from Last 3 Encounters:    03/16/18 134 lb 12.8 oz (61.1 kg)   11/10/17 137 lb 6.4 oz (62.3 kg)   09/11/17 137 lb (62.1 kg)              Today, you had the following     No orders found for display         Today's Medication Changes          These changes are accurate as of 3/16/18  2:36 PM.  If you have any questions, ask your nurse or doctor.               Start taking these medicines.        Dose/Directions    terbinafine 1 % cream   Commonly known as:  lamISIL   Used for:  Tinea versicolor   Started by:  Blayne Yoon MD        Apply topically 2 times daily Apply to affected areas on back and abdomen.   Quantity:  24 g   Refills:  0       triamcinolone 0.1 % cream   Commonly known as:  KENALOG   Used for:  Xerotic eczema   Started by:  Blayne Yoon MD        Apply sparingly to neck three times daily for 14 days.   Quantity:  30 g   Refills:  0            Where to get your medicines      These medications were sent to [facility] - [place], [place] - [address], [place] [address]     Phone:  [phone]     terbinafine 1 % cream    triamcinolone 0.1 % cream                Primary Care Provider Office Phone # Fax #    Leida Durant DO [facility] [phone]       [address] [address]        Equal Access to Services     QUANG TOSCANO AH: Hadii patricia ku hadasho Soomaali, waaxda luqadaha, qaybta kaalmada adelakeshiayada, tran villa. So [facility] [phone].    ATENCIÓN: Si habla español, tiene a matias disposición servicios gratuitos de asistencia lingüística. Corrina al [phone].    We comply with applicable federal civil rights laws and [place] laws. We do not discriminate on the basis of race, color, national origin, age, disability, sex, sexual orientation, or gender identity.            Thank you!     Thank you for choosing [facility]  for your care. Our goal is always to provide you with excellent care. Hearing  back from our patients is one way we can continue to improve our services. Please take a few minutes to complete the written survey that you may receive in the mail after your visit with us. Thank you!             Your Updated Medication List - Protect others around you: Learn how to safely use, store and throw away your medicines at www.disposemymeds.org.          This list is accurate as of 3/16/18  2:36 PM.  Always use your most recent med list.                   Brand Name Dispense Instructions for use Diagnosis    gabapentin 300 MG capsule    NEURONTIN    60 capsule    Take 2 capsules (600 mg) by mouth At Bedtime    Chronic bilateral low back pain with bilateral sciatica       omeprazole 20 MG tablet     90 tablet    Take 1 tablet (20 mg) by mouth daily Take 30-60 minutes before a meal.    Gastroesophageal reflux disease, esophagitis presence not specified       ranitidine 150 MG tablet    ZANTAC    90 tablet    Take 1 tablet (150 mg) by mouth At Bedtime    Gastroesophageal reflux disease, esophagitis presence not specified       terbinafine 1 % cream    lamISIL    24 g    Apply topically 2 times daily Apply to affected areas on back and abdomen.    Tinea versicolor       triamcinolone 0.1 % cream    KENALOG    30 g    Apply sparingly to neck three times daily for 14 days.    Xerotic eczema

## 2018-03-19 ENCOUNTER — TELEPHONE (OUTPATIENT)
Dept: FAMILY MEDICINE | Facility: CLINIC | Age: 63
End: 2018-03-19

## 2018-03-19 DIAGNOSIS — K21.9 GASTROESOPHAGEAL REFLUX DISEASE, ESOPHAGITIS PRESENCE NOT SPECIFIED: ICD-10-CM

## 2018-03-19 NOTE — TELEPHONE ENCOUNTER
Insurance will not pay for anymore omeprazole for Saw Jagdish.  He reportedly takes it for acid reflux.  He is able to get zantac (ranitidine) from the pharmacy.  Please let Saw Jagdish know he can either buy the omeprazole himself or fill the prescription for Zantac and try that for his acid reflux.  If his symptoms worsen, we should see him in clinic or he can present to the ED for more urgent evalution if necessary.    Riley Purdy MD

## 2018-03-19 NOTE — TELEPHONE ENCOUNTER
PA needed for Omeprazole. Pharmacist comments read: Max 120 days supply in 365 days. Please call ins for PA or change to alternative medication.       Please advise. /JOSE Seth  Routed to Dr. Purdy

## 2018-05-08 ENCOUNTER — OFFICE VISIT (OUTPATIENT)
Dept: FAMILY MEDICINE | Facility: CLINIC | Age: 63
End: 2018-05-08
Payer: COMMERCIAL

## 2018-05-08 VITALS
OXYGEN SATURATION: 97 % | HEIGHT: 60 IN | DIASTOLIC BLOOD PRESSURE: 64 MMHG | SYSTOLIC BLOOD PRESSURE: 101 MMHG | TEMPERATURE: 98 F | WEIGHT: 130.8 LBS | BODY MASS INDEX: 25.68 KG/M2 | RESPIRATION RATE: 16 BRPM | HEART RATE: 61 BPM

## 2018-05-08 DIAGNOSIS — K13.79 MASS OF ORAL CAVITY: Primary | ICD-10-CM

## 2018-05-08 NOTE — PROGRESS NOTES
SUBJECTIVE:  Saw is a 63-year-old male who is new to me, who comes in with complaints of a mass in his oral cavity for the past three or four months.  He reports that it has gotten bigger and smaller over that time but for the past two weeks has been consistently there.  He reports that he has some pain with it.  He denies any difficulty swallowing.  He reports no bleeding from the area.  He reports no other masses.  He is a nonsmoker and doesn't chew betel nut.  He does have poor dentition.  He denies any trauma to the area.  He has had no external swelling.  He offers no other complaints.     His chronic problem list and medications were reviewed.   REVIEW OF SYSTEMS:  No fevers or chills.  No weight change since last visit.  He reports that his left ear does feel plugged on occasion.   PHYSICAL EXAM:  Exam reveals well-appearing 63-year-old in no acute distress.   SKIN:  Supple, with no rashes or ecchymoses.  No pallor or icterus.   HEENT:  Normocephalic.  Conjunctivae were clear.  Oropharynx is moist, with poor dentition.  He does have a firm mass at the posterior aspect of the mandible on the left.  There are no ulcerations associated with this.  There appeared to be no bleeding or exudates.  It was mildly tender to palpation.  No other oral lesions were noted on my exam.  TMs were clear bilaterally.   NECK:  Supple, with no lymphadenopathy.   ASSESSMENT:  A 63-year-old male with oral mass.   PLAN:  We'll get a CT of the area.  I'll have the patient follow up to review the results of this.  I suspect he'll need to see ENT for further evaluation and removal of this, but we'll make a plan after obtaining the CT results.

## 2018-05-08 NOTE — NURSING NOTE
Due to patient being non-English speaking/uses sign language, an  was used for this visit. Only for face-to-face interpretation by an external agency, date and length of interpretation can be found on the scanned worksheet.     name: Junior Gutierrez  Agency: Rachael Watson  Language: Meaghan   Telephone number: [phone]  Type of interpretation: Face-to-face, spoken

## 2018-05-08 NOTE — MR AVS SNAPSHOT
"              After Visit Summary   2018    Saw Jagidsh    MRN: [id number]           Patient Information     Date Of Birth          1955        Visit Information        Provider Department      2018 2:50 PM Kelton Ghosh MD [facility]        Today's Diagnoses     Mass of oral cavity    -  1       Follow-ups after your visit        Follow-up notes from your care team     Return in about 7 days (around 5/15/2018).      Future tests that were ordered for you today     Open Future Orders        Priority Expected Expires Ordered    CT Maxiollofacial w/o & w Contrast Routine  2019            Who to contact     Please call your clinic at [phone] to:    Ask questions about your health    Make or cancel appointments    Discuss your medicines    Learn about your test results    Speak to your doctor            Additional Information About Your Visit        MyChart Information     Jobr is an electronic gateway that provides easy, online access to your medical records. With Jobr, you can request a clinic appointment, read your test results, renew a prescription or communicate with your care team.     To sign up for Jobr visit the website at [email or web address]   You will be asked to enter the access code listed below, as well as some personal information. Please follow the directions to create your username and password.     Your access code is: [email or web address]  Expires: 2018  2:36 PM     Your access code will  in 90 days. If you need help or a new code, please contact your [facility] or call [phone] for assistance.        Care EveryWhere ID     This is your Care EveryWhere ID. This could be used by other organizations to access your [facility] medical records  [id number]        Your Vitals Were     Pulse Temperature Respirations Height Pulse Oximetry BMI (Body Mass Index)    61 98  F (36.7  C) (Oral) 16 4' 11.45\" (151 cm) 97% 26.02 " kg/m2       Blood Pressure from Last 3 Encounters:   05/08/18 101/64   03/16/18 108/69   11/10/17 108/66    Weight from Last 3 Encounters:   05/08/18 130 lb 12.8 oz (59.3 kg)   03/16/18 134 lb 12.8 oz (61.1 kg)   11/10/17 137 lb 6.4 oz (62.3 kg)              We Performed the Following      : Sign Language or Oral - 23-37 minutes        Primary Care Provider Office Phone # Fax #    Leida NealDO [phone]       [address] [address]        Equal Access to Services     QUANG TOSCANO : Hadii patricia shieldso Socharity, waaxda luqadaha, qaybta kaalmada adelakeshiayada, tran villa. So [facility] [phone].    ATENCIÓN: Si habla español, tiene a matias disposición servicios gratuitos de asistencia lingüística. Llame al [phone].    We comply with applicable federal civil rights laws and [place] laws. We do not discriminate on the basis of race, color, national origin, age, disability, sex, sexual orientation, or gender identity.            Thank you!     Thank you for choosing [facility]  for your care. Our goal is always to provide you with excellent care. Hearing back from our patients is one way we can continue to improve our services. Please take a few minutes to complete the written survey that you may receive in the mail after your visit with us. Thank you!             Your Updated Medication List - Protect others around you: Learn how to safely use, store and throw away your medicines at www.disposemymeds.org.          This list is accurate as of 5/8/18 11:59 PM.  Always use your most recent med list.                   Brand Name Dispense Instructions for use Diagnosis    gabapentin 300 MG capsule    NEURONTIN    60 capsule    Take 2 capsules (600 mg) by mouth At Bedtime    Chronic bilateral low back pain with bilateral sciatica       omeprazole 20 MG tablet     90 tablet    Take 1 tablet (20 mg) by mouth daily  Take 30-60 minutes before a meal.    Gastroesophageal reflux disease, esophagitis presence not specified       ranitidine 150 MG tablet    ZANTAC    90 tablet    Take 1 tablet (150 mg) by mouth At Bedtime    Gastroesophageal reflux disease, esophagitis presence not specified       terbinafine 1 % cream    lamISIL    24 g    Apply topically 2 times daily Apply to affected areas on back and abdomen.    Tinea versicolor       triamcinolone 0.1 % cream    KENALOG    30 g    Apply sparingly to neck three times daily for 14 days.    Xerotic eczema

## 2018-05-09 NOTE — PATIENT INSTRUCTIONS
CT MAXIOLLOFACIAL:  [facility]  [address]  [address]  [place] [address]  [phone]  Date:  Monday May 14, 2018  Time:  1:00 PM  Nothing to eat or drink 3 hours prior to appointment.   has been requested for this appointment.  FLYING EAGLE will  patient at 12:00 noon  [phone]  Given to Corby Augustin  5/9/18

## 2018-05-25 ENCOUNTER — OFFICE VISIT (OUTPATIENT)
Dept: FAMILY MEDICINE | Facility: CLINIC | Age: 63
End: 2018-05-25
Payer: COMMERCIAL

## 2018-05-25 VITALS
HEIGHT: 60 IN | HEART RATE: 53 BPM | DIASTOLIC BLOOD PRESSURE: 66 MMHG | WEIGHT: 131 LBS | TEMPERATURE: 97.8 F | SYSTOLIC BLOOD PRESSURE: 115 MMHG | BODY MASS INDEX: 25.72 KG/M2

## 2018-05-25 DIAGNOSIS — K13.79 MASS OF ORAL CAVITY: Primary | ICD-10-CM

## 2018-05-25 NOTE — PATIENT INSTRUCTIONS
To do list:  1) Make appointment to see ear nose and throat doctor    ENT REFERRAL APPOINTMENT:  [facility]  [address]  [address]  [place] [address]  [phone]  Date:  Wednesday June 6, 2018  Time:  3:10 PM with Dr. Sánchez   has been requested for this appointment.  FLYING RODGERS will  patient at 2:10 PM  [phone]  Given to Corby Augustin  5/25/18

## 2018-05-25 NOTE — MR AVS SNAPSHOT
"              After Visit Summary   5/25/2018    Saw Jagdish    MRN: [id number]           Patient Information     Date Of Birth          1955        Visit Information        Provider Department      5/25/2018 1:10 PM Portia Palumbo MD [facility]        Today's Diagnoses     Mass of oral cavity    -  1      Care Instructions    To do list:  1) Make appointment to see ear nose and throat doctor          Follow-ups after your visit        Additional Services     OTOLARYNGOLOGY REFERRAL       Patient to stop at the [facility] Desk    Reason for Referral: Mass in oral cavity x 4 months, L posterior      needed: Yes  Language: Meaghan    May leave message on voicemail: No                  Future tests that were ordered for you today     Open Future Orders        Priority Expected Expires Ordered    OTOLARYNGOLOGY REFERRAL Routine  5/25/2019 5/25/2018            Who to contact     Please call your clinic at [phone] to:    Ask questions about your health    Make or cancel appointments    Discuss your medicines    Learn about your test results    Speak to your doctor            Additional Information About Your Visit        Care EveryWhere ID     This is your Care EveryWhere ID. This could be used by other organizations to access your [facility] medical records  [id number]        Your Vitals Were     Pulse Temperature Height BMI (Body Mass Index)          53 97.8  F (36.6  C) 4' 11.5\" (151.1 cm) 26.02 kg/m2         Blood Pressure from Last 3 Encounters:   05/25/18 115/66   05/08/18 101/64   03/16/18 108/69    Weight from Last 3 Encounters:   05/25/18 131 lb (59.4 kg)   05/08/18 130 lb 12.8 oz (59.3 kg)   03/16/18 134 lb 12.8 oz (61.1 kg)               Primary Care Provider Office Phone # Fax #    Leida Carleen Neal [phone]       [place] [id number]        Equal Access to Services     QUANG TOSCANO AH: braden Bravo " miguelito veronicaadrielkai tanyatran thomas. So [facility] [phone].    ATENCIÓN: Si dusty demarco, tiene a matias disposición servicios gratuitos de asistencia lingüística. Corrina al [phone].    We comply with applicable federal civil rights laws and [place] laws. We do not discriminate on the basis of race, color, national origin, age, disability, sex, sexual orientation, or gender identity.            Thank you!     Thank you for choosing [facility]  for your care. Our goal is always to provide you with excellent care. Hearing back from our patients is one way we can continue to improve our services. Please take a few minutes to complete the written survey that you may receive in the mail after your visit with us. Thank you!             Your Updated Medication List - Protect others around you: Learn how to safely use, store and throw away your medicines at www.disposemymeds.org.          This list is accurate as of 5/25/18  1:40 PM.  Always use your most recent med list.                   Brand Name Dispense Instructions for use Diagnosis    gabapentin 300 MG capsule    NEURONTIN    60 capsule    Take 2 capsules (600 mg) by mouth At Bedtime    Chronic bilateral low back pain with bilateral sciatica       omeprazole 20 MG tablet     90 tablet    Take 1 tablet (20 mg) by mouth daily Take 30-60 minutes before a meal.    Gastroesophageal reflux disease, esophagitis presence not specified       ranitidine 150 MG tablet    ZANTAC    90 tablet    Take 1 tablet (150 mg) by mouth At Bedtime    Gastroesophageal reflux disease, esophagitis presence not specified       terbinafine 1 % cream    lamISIL    24 g    Apply topically 2 times daily Apply to affected areas on back and abdomen.    Tinea versicolor       triamcinolone 0.1 % cream    KENALOG    30 g    Apply sparingly to neck three times daily for 14 days.    Xerotic eczema

## 2018-05-25 NOTE — PROGRESS NOTES
Preceptor Attestation:   Patient seen, evaluated and discussed with the resident. I have verified the content of the note, which accurately reflects my assessment of the patient and the plan of care.   Supervising Physician:  Kelton Ghosh MD

## 2018-05-25 NOTE — PROGRESS NOTES
"       SUBJECTIVE       Saw Jagdish is a 63 year old  male with a PMH significant for:     Patient Active Problem List   Diagnosis     GERD (gastroesophageal reflux disease)     Back pain     Urinary frequency     Beta-thalassemia (H)     Neuropathy     He presents with mass in the oral cavity. Saw Dr. Ghosh for the same 5/8/2018, CT scan was ordered at that time. Pt did not have this done as he never received a call to schedule. Not much has changed in the past 2 weeks. There is a little bit of pain. No drainage from the mass. Not interfering with eating or drinking. Patient is not a smoker. Does not drink alcohol.     PMH, Medications and Allergies were reviewed and updated as needed.        REVIEW OF SYSTEMS     See HPI        OBJECTIVE     Vitals:    05/25/18 1319   BP: 115/66   Pulse: 53   Temp: 97.8  F (36.6  C)   Weight: 131 lb (59.4 kg)   Height: 4' 11.5\" (151.1 cm)     Body mass index is 26.02 kg/(m^2).    GEN: Well-appearing adult male. Alert, oriented and appropriate. NAD  HEENT: Mucous membranes moist. 0.5 to 1 cm nodule with several overlying soft tissue polyps present Just posterior to the L lower molars. No erythema or drainage    No results found for this or any previous visit (from the past 24 hour(s)).        ASSESSMENT AND PLAN     1. Mass of oral cavity  - OTOLARYNGOLOGY REFERRAL; Future    RTC for routine care or sooner if develops new or worsening symptoms.    Portia Palumbo    "

## 2018-06-06 ENCOUNTER — TRANSFERRED RECORDS (OUTPATIENT)
Dept: HEALTH INFORMATION MANAGEMENT | Facility: CLINIC | Age: 63
End: 2018-06-06

## 2018-06-18 NOTE — NURSING NOTE
Due to patient being non-English speaking/uses sign language, an  was used for this visit. Only for face-to-face interpretation by an external agency, date and length of interpretation can be found on the scanned worksheet.     name: Corby Khoury  Agency: Rachael Watson  Language: Meaghan   Telephone number: [phone]  Type of interpretation: Face-to-face, spoken

## 2019-06-06 ENCOUNTER — OFFICE VISIT (OUTPATIENT)
Dept: FAMILY MEDICINE | Facility: CLINIC | Age: 64
End: 2019-06-06
Payer: COMMERCIAL

## 2019-06-06 VITALS
RESPIRATION RATE: 20 BRPM | WEIGHT: 130.4 LBS | SYSTOLIC BLOOD PRESSURE: 118 MMHG | BODY MASS INDEX: 25.6 KG/M2 | DIASTOLIC BLOOD PRESSURE: 73 MMHG | OXYGEN SATURATION: 97 % | HEART RATE: 61 BPM | TEMPERATURE: 98.4 F | HEIGHT: 60 IN

## 2019-06-06 DIAGNOSIS — K21.9 GASTROESOPHAGEAL REFLUX DISEASE, ESOPHAGITIS PRESENCE NOT SPECIFIED: ICD-10-CM

## 2019-06-06 DIAGNOSIS — M25.562 ACUTE PAIN OF LEFT KNEE: Primary | ICD-10-CM

## 2019-06-06 ASSESSMENT — MIFFLIN-ST. JEOR: SCORE: 1221.05

## 2019-06-06 NOTE — PROGRESS NOTES
"SUBJECTIVE  HPI: Saw Jagdish is a 64 year old male who presents with complaints of pain in the back of his left knee that started 3 days ago. No inciting event. Feels slightly swollen behind his knee. Has had some clicking in knee. Has had this once before, over 10 years. Denies any recent twisting of knee, injury or fall. No hx of chronic left knee pain.     ROS: No fevers or chills. No calf pain. No ankle swelling. Endorses itchiness over shins bilaterally. Reports burning sensation in the bottom of his feet and in his low back that has been ongoing for years. Reports heartburn occasionally and would like refill for zantac.    PMH:   Patient Active Problem List    Diagnosis Date Noted     Neuropathy 04/08/2015     Priority: Medium     Beta-thalassemia (H) 09/26/2014     Priority: Medium     Diagnosis updated by automated process. Provider to review and confirm.       GERD (gastroesophageal reflux disease) 12/11/2013     Priority: Medium     Back pain 12/11/2013     Priority: Medium     Urinary frequency 12/11/2013     Priority: Medium       Social Hx:  Social History     Social History Narrative     Not on file     History   Smoking Status     Never Smoker   Smokeless Tobacco     Never Used       OBJECTIVE:  Vitals: /73 (BP Location: Left arm, Patient Position: Sitting)   Pulse 61   Temp 98.4  F (36.9  C) (Oral)   Resp 20   Ht 1.511 m (4' 11.5\")   Wt 59.1 kg (130 lb 6.4 oz)   SpO2 97%   BMI 25.90 kg/m    General: Pleasant. Older gentleman. No distress.  HEENT: Moist mucous membranes. Extraocular movements intact. Sclera non-injected.  Heart: Regular rate and rhythm. No murmurs, rubs, or gallops.  Extremities: Extremities warm and well-perfused. Bilateral lower extremity venous stasis dermatitis.   Lungs: Clear to auscultation bilaterally. No wheezes or crackles. Good air movement.  GI: Abdomen normal to inspection. No ridigidity, distension, or guarding. Soft and non-tender to palpation throughout " abdomen.  Knee: Left knee with mild fullness palpable in popliteal fossae. No significant joint effusion.  Able to bear weight.  Full ROM in extension and flexion but some tenderness with flexion.  No joint line tenderness. No ligamentous instability.  Negative Juan's. Positive Thessaly test.  Negative patellar grind test.  No infrapatellar tenderness.  Negative patellar apprehension. Right knee normal.   Neuro: Strength and sensation grossly symmetric and normal. Mildly antalgic gait.      ASSESSMENT & PLAN:    Saw was seen today for pain.    Diagnoses and all orders for this visit:    Acute pain of left knee: acute onset of swelling in popliteal fossae, likely popliteal cyst. Suspect etiology is due acute worsening of osteoarthritis vs possible meniscal tear give exam findings. Will start with conservative treatment with short course of NSAIDs, rest and Ice packs for two weeks. If not improving or if worsening at anytime advised patient to return to clinic.   -     naproxen (NAPROSYN) 375 MG tablet; Take 1 tablet (375 mg) by mouth 2 times daily (with meals)  -      : Sign Language or Oral - 38-52 minutes    Gastroesophageal reflux disease, esophagitis presence not specified: will refill zantac.   -     ranitidine (ZANTAC) 150 MG tablet; Take 1 tablet (150 mg) by mouth nightly as needed for heartburn        Orders Placed This Encounter   Procedures      : Sign Language or Oral - 38-52 minutes       Return to clinic in 2 weeks for follow up.      The patient was actively involved in the decision making process, and all the questions were answered to their satisfaction prior to leaving.       Patient precepted with attending physician, Dr. Cannon.    Leida Enciso DO   PGY-3 [facility]  Pager: [phone]

## 2019-06-06 NOTE — NURSING NOTE
Due to patient being non-English speaking/uses sign language, an  was used for this visit. Only for face-to-face interpretation by an external agency, date and length of interpretation can be found on the scanned worksheet.       name:  Junior Gutierrez  Agency: Rachael Watson  Language: Meaghan  Telephone number:   [phone]  Type of interpretation:  Face-to-face, Spoken

## 2019-06-06 NOTE — PATIENT INSTRUCTIONS
Dear Saw Jagdish,    It was a pleasure seeing you in clinic today.   Naproxen and Ranitidine sent to pharmacy.  Ice knee twice daily.  Follow up in 2 weeks or sooner if any new or worsening symptoms.  Please do not hesitate to call or return to clinic if you have an questions or concerns.      Sincerely,    Dr. Enciso

## 2019-06-17 ENCOUNTER — OFFICE VISIT (OUTPATIENT)
Dept: FAMILY MEDICINE | Facility: CLINIC | Age: 64
End: 2019-06-17
Payer: COMMERCIAL

## 2019-06-17 VITALS
HEIGHT: 60 IN | DIASTOLIC BLOOD PRESSURE: 68 MMHG | WEIGHT: 133.8 LBS | BODY MASS INDEX: 26.27 KG/M2 | TEMPERATURE: 98.1 F | HEART RATE: 58 BPM | SYSTOLIC BLOOD PRESSURE: 111 MMHG | RESPIRATION RATE: 20 BRPM | OXYGEN SATURATION: 96 %

## 2019-06-17 DIAGNOSIS — M25.562 ACUTE PAIN OF LEFT KNEE: ICD-10-CM

## 2019-06-17 DIAGNOSIS — G89.29 CHRONIC BILATERAL LOW BACK PAIN WITH BILATERAL SCIATICA: ICD-10-CM

## 2019-06-17 DIAGNOSIS — M54.41 CHRONIC BILATERAL LOW BACK PAIN WITH BILATERAL SCIATICA: ICD-10-CM

## 2019-06-17 DIAGNOSIS — G62.9 NEUROPATHY: Primary | ICD-10-CM

## 2019-06-17 DIAGNOSIS — M54.42 CHRONIC BILATERAL LOW BACK PAIN WITH BILATERAL SCIATICA: ICD-10-CM

## 2019-06-17 RX ORDER — GABAPENTIN 300 MG/1
600 CAPSULE ORAL AT BEDTIME
Qty: 60 CAPSULE | Refills: 11 | Status: SHIPPED | OUTPATIENT
Start: 2019-06-17 | End: 2019-11-06

## 2019-06-17 ASSESSMENT — MIFFLIN-ST. JEOR: SCORE: 1235.66

## 2019-06-17 NOTE — PROGRESS NOTES
Preceptor Attestation:   Patient seen, evaluated and discussed with the resident. I have verified the content of the note, which accurately reflects my assessment of the patient and the plan of care.   Supervising Physician:  Roberto Cannon MD

## 2019-06-17 NOTE — PROGRESS NOTES
"SUBJECTIVE  HPI: Saw Jagdish is a 64 year old male who presents for follow up of left knee pain.     Taking naproxen twice a day. Helping. Reports that the swelling in his knee is improving a little. He is using ice packs and applying topical analgesic which have been helpful.    Reports it is still painful to walk but it is improving. He can use stairs without pain. Only has pain in left knee if walking long distances. He reports pain with flexion. Occasionally has pain when standing after sitting for long periods. No catching or clicking in knee.     He reports chronic low back pain, about 10 years duration. Unchanged. Mild in intensity.     No weakness, numbness or tingling.     He reports chronic burning sensation in his feet. He previously took gabapentin which was helpful but has been out of this for several months.     ROS: per HPI.     PMH:   Patient Active Problem List    Diagnosis Date Noted     Neuropathy 04/08/2015     Priority: Medium     Beta-thalassemia (H) 09/26/2014     Priority: Medium     Diagnosis updated by automated process. Provider to review and confirm.       GERD (gastroesophageal reflux disease) 12/11/2013     Priority: Medium     Back pain 12/11/2013     Priority: Medium     Urinary frequency 12/11/2013     Priority: Medium       Social Hx:  Social History     Social History Narrative     Not on file     History   Smoking Status     Never Smoker   Smokeless Tobacco     Never Used       OBJECTIVE:  Vitals: /68   Pulse 58   Temp 98.1  F (36.7  C) (Oral)   Resp 20   Ht 1.51 m (4' 11.45\")   Wt 60.7 kg (133 lb 12.8 oz)   SpO2 96%   BMI 26.62 kg/m    General: Pleasant. Older gentleman. No distress.  Heart: Regular rate and rhythm. No murmurs, rubs, or gallops.  Extremities: Radial and dorsal pedal pulses symmetric and intact. Extremities warm and well-perfused. No peripheral edema.  Lungs: Clear to auscultation bilaterally.  Back: Normal to inspection.  No tenderness over spinous " processes. Bilateral mild tenderness over lumbar paraspinal muscles.  No tenderness over SI joints.  Normal ROM in flexion, extension, lateral flexion, and torsion.  Normal heel (L4) and toe (S1) walking.  5/5 strength on resisted great toe dorsiflexion (L5).  Negative straight leg raise bilaterally.      Knee: Normal to inspection.  No effusion. Mild TTP at left popliteal fossae, no swelling.  Able to bear weight.  Full ROM in extension, mildy decreased ROM with flexion in left knee.  No joint line tenderness. No ligamentous instability.    Neuro: Strength and sensation grossly symmetric and normal.    ASSESSMENT & PLAN:      Saw was seen today for musculoskeletal problem.    Diagnoses and all orders for this visit:        Acute pain of left knee: Improving with conservative measures (NSAID's, ice). Plan to continue conservative treatment at this time, will refill naproxen and recommended gentle home strengthening exercises. If pain is not improving in two weeks plan for referral to PT vs Ortho at that time.   -     naproxen (NAPROSYN) 375 MG tablet; Take 1 tablet (375 mg) by mouth 2 times daily as needed for moderate pain    Neuropathy: Will refill gabapentin.    -     gabapentin (NEURONTIN) 300 MG capsule; Take 2 capsules (600 mg) by mouth At Bedtime  -      : Sign Language or Oral - 38-52 minutes    Chronic bilateral low back pain with bilateral sciatica: Chronic. After discussion with patient plan for conservative treatment with gentle home stretching, heat packs. Gabapentin refilled today.       Orders Placed This Encounter   Procedures      : Sign Language or Oral - 38-52 minutes       Return to clinic in 2 weeks for follow up.      The patient was actively involved in the decision making process, and all the questions were answered to their satisfaction prior to leaving.       Patient precepted with attending physician, Dr. Anderson.    Leida Enciso, DO   PGY-3 [facility]  [facility]  Pager: [phone]

## 2019-06-17 NOTE — NURSING NOTE
Due to patient being non-English speaking/uses sign language, an  was used for this visit. Only for face-to-face interpretation by an external agency, date and length of interpretation can be found on the scanned worksheet.       name:  Junior Gutierrez  Agency: [employer]  Language: Meaghan  Telephone number:   [phone]  Type of interpretation:  Face-to-face, Spoken

## 2019-06-17 NOTE — PATIENT INSTRUCTIONS
Patient Education     Quad Set for Leg and Knee    This exercise is designed to stretch and strengthen your knee. Before beginning, read through all the instructions. While exercising, breathe normally and use smooth movements. If you feel any pain, stop the exercise. If pain continues, call your healthcare provider.  1. Sit on the floor with one leg straight, the other bent.  2. Flex the foot of your straight leg by pointing your toes toward you. Press the back of your knee into the floor while tightening the muscle on the top of your thigh. Hold for 3 to 5 seconds. Then relax.  3. Repeat 10 to 15 times. Do 3 to 5 sets a day.  Caution    Don t arch your back.    Don t hunch your shoulders.  Date Last Reviewed: 2/1/2018 2000-2018 [employer]. [address] [place] [address]. All rights reserved. This information is not intended as a substitute for professional medical care. Always follow your healthcare professional's instructions.    Patient Education     Hamstring Stretch (Flexibility)    1. Sit on the floor with your right leg straight in front of you. Bend your left leg so the sole of your foot rests against the inside of your right thigh.  2. Reach toward your ankle. Keep your knee, neck, and back straight. Feel the stretch in the back of your thigh.  3. Hold for 30 to 60 seconds. Repeat 2 times, or as instructed.  4. Switch legs and repeat.  5. Repeat this exercise 3 times per day, or as instructed.     Tip: Don t bounce while you re stretching.   Date Last Reviewed: 3/10/2016    9285-9700 [employer]. [address] [place] [address]. All rights reserved. This information is not intended as a substitute for professional medical care. Always follow your healthcare professional's instructions.

## 2019-06-26 NOTE — PROGRESS NOTES
Preceptor Attestation:   Patient seen, evaluated and discussed with the resident. I have verified the content of the note, which accurately reflects my assessment of the patient and the plan of care.   Supervising Physician:  Yariel Anderson MD

## 2019-07-03 ENCOUNTER — OFFICE VISIT (OUTPATIENT)
Dept: FAMILY MEDICINE | Facility: CLINIC | Age: 64
End: 2019-07-03
Payer: COMMERCIAL

## 2019-07-03 ENCOUNTER — AMBULATORY - HEALTHEAST (OUTPATIENT)
Dept: ADMINISTRATIVE | Facility: REHABILITATION | Age: 64
End: 2019-07-03

## 2019-07-03 VITALS
HEART RATE: 58 BPM | WEIGHT: 130 LBS | BODY MASS INDEX: 25.52 KG/M2 | HEIGHT: 60 IN | SYSTOLIC BLOOD PRESSURE: 104 MMHG | OXYGEN SATURATION: 98 % | DIASTOLIC BLOOD PRESSURE: 63 MMHG | RESPIRATION RATE: 18 BRPM | TEMPERATURE: 97.7 F

## 2019-07-03 DIAGNOSIS — M25.562 ACUTE PAIN OF LEFT KNEE: ICD-10-CM

## 2019-07-03 DIAGNOSIS — Z00.00 ROUTINE HEALTH MAINTENANCE: Primary | ICD-10-CM

## 2019-07-03 LAB
BASOPHILS # BLD AUTO: 0.3 THOU/UL (ref 0–0.2)
BASOPHILS NFR BLD AUTO: 2 % (ref 0–2)
BUN SERPL-MCNC: 12.8 MG/DL (ref 7–21)
CALCIUM SERPL-MCNC: 8.8 MG/DL (ref 8.5–10.1)
CHLORIDE SERPLBLD-SCNC: 107.1 MMOL/L (ref 98–110)
CHOLEST SERPL-MCNC: 170.3 MG/DL (ref 0–200)
CHOLEST/HDLC SERPL: 4.5 {RATIO} (ref 0–5)
CO2 SERPL-SCNC: 25.7 MMOL/L (ref 20–32)
CREAT SERPL-MCNC: 1.1 MG/DL (ref 0.7–1.3)
EOSINOPHIL # BLD AUTO: 0.8 THOU/UL (ref 0–0.4)
EOSINOPHIL NFR BLD AUTO: 8 % (ref 0–6)
ERYTHROCYTE [DISTWIDTH] IN BLOOD BY AUTOMATED COUNT: 18.3 % (ref 11–14.5)
GFR SERPL CREATININE-BSD FRML MDRD: 71.6 ML/MIN/1.7 M2
GLUCOSE SERPL-MCNC: 120.8 MG'DL (ref 70–99)
HCT VFR BLD AUTO: 37.8 % (ref 40–54)
HDLC SERPL-MCNC: 38.1 MG/DL
HGB BLD-MCNC: 11.5 G/DL (ref 14–18)
LDLC SERPL CALC-MCNC: 103 MG/DL (ref 0–129)
LYMPHOCYTES # BLD AUTO: 2.4 THOU/UL (ref 0.8–4.4)
LYMPHOCYTES NFR BLD AUTO: 22 % (ref 20–40)
MCH RBC QN AUTO: 18.9 PG (ref 27–34)
MCHC RBC AUTO-ENTMCNC: 30.4 G/DL (ref 32–36)
MCV RBC AUTO: 62 FL (ref 80–100)
MONOCYTES # BLD AUTO: 0.6 THOU/UL (ref 0–0.9)
MONOCYTES NFR BLD AUTO: 5 % (ref 2–10)
NEUTROPHILS # BLD AUTO: 6.7 THOU/UL (ref 2–7.7)
NEUTROPHILS NFR BLD AUTO: 62 % (ref 50–70)
PLATELET # BLD AUTO: 416 THOU/UL (ref 140–440)
PMV BLD AUTO: 11 FL (ref 8.5–12.5)
POTASSIUM SERPL-SCNC: 4.3 MMOL/DL (ref 3.2–4.6)
RBC # BLD AUTO: 6.09 MILL/UL (ref 4.4–6.2)
SODIUM SERPL-SCNC: 138.2 MMOL/L (ref 132–142)
TRIGL SERPL-MCNC: 144.4 MG/DL (ref 0–150)
VLDL CHOLESTEROL: 28.9 MG/DL (ref 7–32)
WBC # BLD AUTO: 10.9 THOU/UL (ref 4–11)

## 2019-07-03 ASSESSMENT — MIFFLIN-ST. JEOR: SCORE: 1215.27

## 2019-07-03 NOTE — PROGRESS NOTES
"[facility] Visit    Subjective:  Saw Jagdish is a 64 year old male with a PMHx significant for   Patient Active Problem List   Diagnosis     GERD (gastroesophageal reflux disease)     Back pain     Urinary frequency     Beta-thalassemia (H)     Neuropathy    who presents for follow-up of left knee pain due to acute injury.  Was started on conservative therapy including naproxen and icing 1 month ago.  To follow-up 2 weeks ago and had improvement symptoms and was continued on conservative therapy.  Today he reports that his knee pain is about the same as it was 2 weeks ago.  Pain is worse going up and down stairs, he also had pain with walking long distances.  He has been doing home exercises for leg strengthening there were given to him at his last visit.  He says that doing these exercises have actually helped with the pain.  No new injuries, has not noticed any swelling of the left knee, no fever/chills, no falls.    ROS:   A complete 12 point ROS was completed and negative except as stated above    Objective:  Vitals:    07/03/19 1012   BP: 104/63   BP Location: Right arm   Patient Position: Sitting   Pulse: 58   Resp: 18   Temp: 97.7  F (36.5  C)   TempSrc: Oral   SpO2: 98%   Weight: 59 kg (130 lb)   Height: 1.505 m (4' 11.25\")     Body mass index is 26.04 kg/m .    GEN: NAD, healthy, alert  RESP: CTAB, no w/r/r  CV: RRR, nl S1/S2, no S3/S4, no m/r/g, no peripheral edema, peripheral pulses strong  ABD: soft, NT/ND, no hepatosplenomegaly, no masses, no rebound, +BS throughout  EXTREMITIES: Warm and well-perfused  SKIN: no suspicious lesions or rashes  KNEE: Left: No obvious deformity on inspection.  No joint line tenderness, no effusion.  No tenderness of the popliteal fossa.  Range of motion appears full.  No crepitus on passive flexion and extension.  Right: No deformity, no tenderness, full range of motion.  NEURO: normal strength and tone, sensory exam grossly normal, mentation intact, " speech normal  PSYCH: mentation appears normal, affect normal/bright    No results found for this or any previous visit (from the past 24 hour(s)).    Assessment/Plan:  Saw was seen today for follow up.    Diagnoses and all orders for this visit:    Acute pain of left knee  Acute pain of left knee, slowly improving with conservative measures including NSAIDs, icing at home exercises. I see no evidence of acute bony defect, joint effusion, septic arthropathy.  Given that he has been having relief with home exercises I think that physical therapy would be beneficial to improve strength and range of motion.  I will refill his naproxen today, he is open to pursuing physical therapy.  Patient will follow-up with me again in 2-3 weeks to follow-up on the pain as well as to follow-up on routine health maintenance labs drawn below  -     naproxen (NAPROSYN) 375 MG tablet; Take 1 tablet (375 mg) by mouth 2 times daily as needed for moderate pain  -     PHYSICAL THERAPY REFERRAL; Future    Routine health maintenance  -     CBC with Diff Plt ([facility])  -     Basic Metabolic Panel ([facility])  -     Lipid Panel ([facility])      Options for treatment and follow-up care were reviewed with the patient who was engaged and actively involved in the decision making process, verbalized understanding of the options discussed, and satisfied with the final plan.    Patient was staffed with supervising physician, Dr. Atkinson.     Kalia Montgomery MD, PGY-2  [facility]

## 2019-07-03 NOTE — PROGRESS NOTES
Preceptor Attestation:   Patient seen, evaluated and discussed with the resident. I have verified the content of the note, which accurately reflects my assessment of the patient and the plan of care.   Supervising Physician:  Chanel Atkinson MD

## 2019-07-03 NOTE — NURSING NOTE
Due to patient being non-English speaking/uses sign language, an  was used for this visit. Only for face-to-face interpretation by an external agency, date and length of interpretation can be found on the scanned worksheet.       name:  Junior Gutierrez  Agency: [employer]  Language: Meaghan  Telephone number:   [phone]  Type of interpretation:  Face-to-face, Spoken

## 2019-07-03 NOTE — PATIENT INSTRUCTIONS
Thanks for coming in today.    I placed a referral for physical therapy; in the meantime continue to do your home exercises, take naproxen as needed and use ice/heat as needed.    We will draw labs today for routine screening    Follow up with Dr. Enciso again in 2 weeks to follow up on the pain.      PHYSICAL THERAPY REFERRAL   July 3, 2019 Demographics and referral for Physical Therapy faxed to [facility] at [phone], they will contact patient to schedule.    [facility]  Phone: [phone]  Fax: [phone]  Scheduling Hours: Monday - Friday, 7 am to 4:30 pm    Argelia Shen

## 2019-07-09 NOTE — RESULT ENCOUNTER NOTE
"Rupinder -- could you please call the  and have them relay the following results? Thanks!!    \"Dear Saw Jagdish,    The results are back from your recent visit. Your cholesterol looks good; your total cholesterol and LDL (\"bad cholesterol\") are both normal. Your HDL (\"good cholesterol\") is a little low, but can get better with good diet and exercise. Your electrolytes and kidney function are normal. Your hemoglobin (one of your blood counts) is low, but is about the same as it was 4 years ago.  We can talk more about this and what we need to do for more testing at your upcoming appointment with me; there is nothing that needs to be done about this prior to our next visit.     Please call the clinic with any questions.  We look forward to seeing you on 7/29.    Sincerely,    Dr. Cramer\""

## 2019-07-16 ENCOUNTER — MEDICAL CORRESPONDENCE (OUTPATIENT)
Dept: HEALTH INFORMATION MANAGEMENT | Facility: CLINIC | Age: 64
End: 2019-07-16

## 2019-07-16 ENCOUNTER — OFFICE VISIT - HEALTHEAST (OUTPATIENT)
Dept: PHYSICAL THERAPY | Facility: REHABILITATION | Age: 64
End: 2019-07-16

## 2019-07-16 DIAGNOSIS — M25.562 ACUTE PAIN OF LEFT KNEE: ICD-10-CM

## 2019-07-22 ENCOUNTER — DOCUMENTATION ONLY (OUTPATIENT)
Dept: FAMILY MEDICINE | Facility: CLINIC | Age: 64
End: 2019-07-22

## 2019-07-22 NOTE — PROGRESS NOTES
To be completed in Nursing note:    Please reference list for forms that require a visit for completion.  Please remind patients that providers are given 3-5 business days to complete and return forms.      Form type: [facility] orders    Date form received: 7/18/19    Date form completed by Physician: 7/18/19    How was form returned to patient (mailed, faxed, or at  for patient to ): FAXED     Date form mailed/faxed/left at  for patient and sent to HIM for scanning: not needed       Once form is left for patient, faxed, or mailed PCS will then close the documentation only encounter.

## 2019-07-23 ENCOUNTER — OFFICE VISIT - HEALTHEAST (OUTPATIENT)
Dept: PHYSICAL THERAPY | Facility: REHABILITATION | Age: 64
End: 2019-07-23

## 2019-07-23 DIAGNOSIS — M25.562 ACUTE PAIN OF LEFT KNEE: ICD-10-CM

## 2019-07-29 ENCOUNTER — OFFICE VISIT (OUTPATIENT)
Dept: FAMILY MEDICINE | Facility: CLINIC | Age: 64
End: 2019-07-29
Payer: COMMERCIAL

## 2019-07-29 VITALS
DIASTOLIC BLOOD PRESSURE: 60 MMHG | RESPIRATION RATE: 18 BRPM | WEIGHT: 132 LBS | TEMPERATURE: 97.6 F | HEIGHT: 60 IN | SYSTOLIC BLOOD PRESSURE: 98 MMHG | BODY MASS INDEX: 25.91 KG/M2 | HEART RATE: 57 BPM | OXYGEN SATURATION: 96 %

## 2019-07-29 DIAGNOSIS — M25.562 ACUTE PAIN OF LEFT KNEE: ICD-10-CM

## 2019-07-29 DIAGNOSIS — Z00.00 ROUTINE GENERAL MEDICAL EXAMINATION AT A HEALTH CARE FACILITY: Primary | ICD-10-CM

## 2019-07-29 DIAGNOSIS — Z91.89 RISK OF MYOCARDIAL INFARCTION OR STROKE 7.5% OR GREATER IN NEXT 10 YEARS: ICD-10-CM

## 2019-07-29 LAB
ALBUMIN SERPL BCP-MCNC: 4 G/DL (ref 3.5–5)
ALP SERPL-CCNC: 94 U/L (ref 45–120)
ALT SERPL W/O P-5'-P-CCNC: 64 U/L (ref 0–45)
AST SERPL-CCNC: 46 U/L (ref 0–40)
BILIRUB DIRECT SERPL-MCNC: 0.2 MG/DL (ref 0–0.5)
BILIRUB SERPL-MCNC: 0.6 MG/DL (ref 0–1)
PROT SERPL-MCNC: 7.3 G/DL (ref 6–8)

## 2019-07-29 RX ORDER — ACETAMINOPHEN 500 MG
500 TABLET ORAL EVERY 6 HOURS PRN
Qty: 60 TABLET | Refills: 3 | Status: SHIPPED | OUTPATIENT
Start: 2019-07-29 | End: 2021-05-28

## 2019-07-29 RX ORDER — ATORVASTATIN CALCIUM 10 MG/1
10 TABLET, FILM COATED ORAL DAILY
Qty: 90 TABLET | Refills: 3 | Status: SHIPPED | OUTPATIENT
Start: 2019-07-29 | End: 2019-11-29

## 2019-07-29 ASSESSMENT — MIFFLIN-ST. JEOR: SCORE: 1228.31

## 2019-07-29 NOTE — LETTER
August 20, 2019      Saw Jagdish  [address] [place] [id number]          Dear Saw Jagdish     The results from the testing done at your last visit show that your liver tests were a little high. This can sometimes happen during a viral GI illness, but I think it is worth seeing you back in the next couple of weeks to repeat these tests. Please come in sooner or let me know if you are having any belly pain, nausea or vomiting.     Please do not hesitate to call the clinic with any questions.   We look forward to seeing you at your next appointment.     Resulted Orders   Hepatic Profile ([facility])   Result Value Ref Range    Bilirubin Total 0.6 0.0 - 1.0 mg/dL    Bilirubin Direct 0.2 <=0.5 mg/dL    Protein Total 7.3 6.0 - 8.0 g/dL    Albumin 4.0 3.5 - 5.0 g/dL    Alkaline Phosphatase 94 45 - 120 U/L    AST (SGOT) 46 (H) 0 - 40 U/L    ALT (SGPT) 64 (H) 0 - 45 U/L    Narrative    Test performed by:  [facility]  [address] [place] [address]       If you have any questions, please call the clinic to make an appointment.    Sincerely,    Kalia Montgomery MD

## 2019-07-29 NOTE — NURSING NOTE
Due to patient being non-English speaking/uses sign language, an  was used for this visit. Only for face-to-face interpretation by an external agency, date and length of interpretation can be found on the scanned worksheet.       name: Corby Khoury  Language: Meaghan  Agency:  [employer]  Telephone number: [phone]  Type of interpretation:  Face-to-face, spoken

## 2019-07-29 NOTE — PROGRESS NOTES
Male Physical Note      Concerns today: No special concerns today.    A Zencoder  was used for  this visit.     ROS:                     CONSTITUTIONAL: no fatigue, no unexpected change in weight  SKIN: no worrisome rashes, no worrisome moles, no worrisome lesions  EYES: no acute vision problems or changes  ENT: no ear problems, no mouth problems, no throat problems  RESP: no significant cough, no shortness of breath  CV: no chest pain, no palpitations, no new or worsening peripheral edema  GI: no nausea, no vomiting, no constipation, no diarrhea    History reviewed. No pertinent past medical history.     Family History   Problem Relation Age of Onset     Diabetes No family hx of      Cancer No family hx of      Heart Disease No family hx of      Coronary Artery Disease No family hx of      Hypertension No family hx of      Hyperlipidemia No family hx of      Breast Cancer No family hx of      Cancer - colorectal No family hx of      Ovarian Cancer No family hx of      Prostate Cancer No family hx of      Other Cancer No family hx of      Mental Illness No family hx of      Cerebrovascular Disease No family hx of      Anesthesia Reaction No family hx of      Asthma No family hx of      Osteoporosis No family hx of      Known Genetic Syndrome No family hx of      Obesity No family hx of      Unknown/Adopted No family hx of     Reviewed no other significant FH           Family History and past Medical History reviewed and unchanged/updated.    Social History     Tobacco Use     Smoking status: Never Smoker     Smokeless tobacco: Never Used   Substance Use Topics     Alcohol use: No       Children ? yes 3 kids; 25, 24, 18    Has anyone hurt you physically, for example by pushing, hitting, slapping or kicking you or forcing you to have sex? Denies  Do you feel threatened or controlled by a partner, ex-partner or anyone in your life? Denies    RISK BEHAVIORS AND HEALTHY HABITS:  Tobacco Use/Smoking:  "None  Illicit Drug Use: None  ETOH: None  Do you use alcohol? No  Sexually Active: Yes  Diet (5-7 servings of fruits/veg daily): Yes   Exercise (30 min accumulated most days):Yes, walking and stretching, going to PT  Dental Care: Yes, one year ago.   Seat Belt Use: Yes     Cholesterol Level (>46 yo or at risk):  Date done 7/3/19  Result(s) total cholesterol 170, HDL 38, , triglycerides 144  ASA use (>3% risk in 5 y): Not currently on baby aspirin  HIV screening:  Recommended and patient declined testing.  Patient states this was done with immigration screening.  I cannot see this result but he says it was negative and defers retesting  Colon CA Screening (>50-75 ):  Date done 10/2015     CV Risk based on Pooled Cohort Risk: 7.8 % 10 year ASVCD risk       Immunization History   Administered Date(s) Administered     HepB 01/18/2013, 03/21/2013, 05/29/2014     Influenza Vaccine, 3 YRS +, IM (QUADRIVALENT W/PRESERVATIVES) 11/07/2014, 10/27/2015, 11/10/2017     MMR 08/27/2013, 05/05/2014     TD (ADULT, 7+) 01/18/2013, 05/05/2014     Tdap (Adacel,Boostrix) 08/27/2013       EXAMINATION:  BP 98/60 (BP Location: Right arm, Patient Position: Sitting, Cuff Size: Adult Regular)   Pulse 57   Temp 97.6  F (36.4  C) (Oral)   Resp 18   Ht 1.511 m (4' 11.5\")   Wt 59.9 kg (132 lb)   SpO2 96%   BMI 26.21 kg/m    GENERAL: healthy, alert and no distress  EYES: Eyes grossly normal to inspection, extraocular movements - intact, and PERRL  HENT: ear canals- normal; TMs- normal; Nose- normal; Mouth- no ulcers, no lesions  NECK: no tenderness, no adenopathy, no asymmetry, no masses, no stiffness; thyroid- normal to palpation  RESP: lungs clear to auscultation - no rales, no rhonchi, no wheezes  BREAST: no masses, no tenderness, no nipple discharge, no palpable axillary masses or adenopathy  CV: regular rates and rhythm, normal S1 S2, no S3 or S4 and no murmur, no click or rub -  ABDOMEN: soft, no tenderness, no  " hepatosplenomegaly, no masses, normal bowel sounds  MS: extremities- no gross deformities noted, no edema  SKIN: no suspicious lesions, no rashes  NEURO: strength and tone- normal, sensory exam- grossly normal, mentation- intact, speech- normal, reflexes- symmetric  BACK: no CVA tenderness, no paralumbar tenderness  PSYCH: Alert and oriented times 3; speech- coherent , normal rate and volume; able to articulate logical thoughts, able to abstract reason, no tangential thoughts, no hallucinations or delusions, affect- normal  LYMPHATICS: ant. cervical- normal, post. cervical- normal, axillary- normal, supraclavicular- normal,     ASSESSMENT & PLAN:  1. Routine general medical examination at a health care facility  2. Risk of myocardial infarction or stroke 7.5% or greater in next 10 years  Patient is up-to-date with USPSTF recommended screens.  Patient is due for Zostavax, immunizations otherwise up-to-date.  He does have an ASCVD risk score of 7.8%, per recommendations patient should be on at least moderate intensity statin and daily baby aspirin.  This somewhat surprising given that his lipids are generally good with only low HDL.  He has no chest pain or shortness of breath at rest or with exertion.  Discussed risks and benefits of adding statin in light of his ASCVD risk score and he would like to start a statin.  He is also interested in starting daily baby aspirin.  Will obtain hepatic panel.  - atorvastatin (LIPITOR) 10 MG tablet; Take 1 tablet (10 mg) by mouth daily  Dispense: 90 tablet; Refill: 3  - aspirin (ASA) 81 MG EC tablet; Take 1 tablet (81 mg) by mouth daily  Dispense: 90 tablet; Refill: 3  - Hepatic Profile ([facility])  -Patient instructed to go to pharmacy for Zostavax.    3. Acute pain of left knee  Patient requesting refill of Tylenol for acute pain of left knee.  Has been getting better with physical therapy, has completed 2 sessions with PT.  - acetaminophen (TYLENOL) 500 MG tablet; Take 1  tablet (500 mg) by mouth every 6 hours as needed for mild pain  Dispense: 60 tablet; Refill: 3    Patient discussed with attending physician Dr. Cannon who agrees with the plan.     Kalia Montgomery MD PGY2  [facility]

## 2019-07-29 NOTE — PATIENT INSTRUCTIONS
Preventive Health Recommendations  Male Ages 50 - 64    Yearly exam:             See your health care provider every year in order to  o   Review health changes.   o   Discuss preventive care.    o   Review your medicines if your doctor has prescribed any.     Have a cholesterol test every 5 years, or more frequently if you are at risk for high cholesterol/heart disease.     Have a diabetes test (fasting glucose) every three years. If you are at risk for diabetes, you should have this test more often.     Have a colonoscopy at age 50, or have a yearly FIT test (stool test). These exams will check for colon cancer.      Talk with your health care provider about whether or not a prostate cancer screening test (PSA) is right for you.    You should be tested each year for STDs (sexually transmitted diseases), if you re at risk.     Shots: Get a flu shot each year. Get a tetanus shot every 10 years.     Nutrition:    Eat at least 5 servings of fruits and vegetables daily.     Eat whole-grain bread, whole-wheat pasta and brown rice instead of white grains and rice.     Get adequate Calcium and Vitamin D.     Lifestyle    Exercise for at least 150 minutes a week (30 minutes a day, 5 days a week). This will help you control your weight and prevent disease.     Limit alcohol to one drink per day.     No smoking.     Wear sunscreen to prevent skin cancer.     See your dentist every six months for an exam and cleaning.     See your eye doctor every 1 to 2 years.    GO TO YOUR PHARMACY FOR YOUR SHINGLES VACCINE

## 2019-07-31 ENCOUNTER — OFFICE VISIT - HEALTHEAST (OUTPATIENT)
Dept: PHYSICAL THERAPY | Facility: REHABILITATION | Age: 64
End: 2019-07-31

## 2019-07-31 DIAGNOSIS — M25.562 ACUTE PAIN OF LEFT KNEE: ICD-10-CM

## 2019-08-12 NOTE — RESULT ENCOUNTER NOTE
"Rupinder-- Please have  call and relay the following:  Thanks!    \"Dear Saw Jagdish:    The results from the testing done at your last visit show that your liver tests were a little high. This can sometimes happen during a viral GI illness, but I think it is worth seeing you back in the next couple of weeks to repeat these tests. Please come in sooner or let me know if you are having any belly pain, nausea or vomiting.    Please do not hesitate to call the clinic with any questions.  We look forward to seeing you at your next appointment.    Dr. Kalia Montgomery  [facility]\""

## 2019-08-15 ENCOUNTER — OFFICE VISIT - HEALTHEAST (OUTPATIENT)
Dept: PHYSICAL THERAPY | Facility: REHABILITATION | Age: 64
End: 2019-08-15

## 2019-08-15 DIAGNOSIS — M25.562 ACUTE PAIN OF LEFT KNEE: ICD-10-CM

## 2019-08-29 ENCOUNTER — OFFICE VISIT (OUTPATIENT)
Dept: FAMILY MEDICINE | Facility: CLINIC | Age: 64
End: 2019-08-29
Payer: COMMERCIAL

## 2019-08-29 VITALS
HEART RATE: 55 BPM | SYSTOLIC BLOOD PRESSURE: 108 MMHG | BODY MASS INDEX: 26.02 KG/M2 | OXYGEN SATURATION: 98 % | DIASTOLIC BLOOD PRESSURE: 65 MMHG | WEIGHT: 131 LBS | TEMPERATURE: 98.3 F | RESPIRATION RATE: 12 BRPM

## 2019-08-29 DIAGNOSIS — R74.01 NONSPECIFIC ELEVATION OF LEVELS OF TRANSAMINASE OR LACTIC ACID DEHYDROGENASE (LDH): Primary | ICD-10-CM

## 2019-08-29 DIAGNOSIS — G89.29 CHRONIC PAIN OF LEFT KNEE: Primary | ICD-10-CM

## 2019-08-29 DIAGNOSIS — R74.02 NONSPECIFIC ELEVATION OF LEVELS OF TRANSAMINASE OR LACTIC ACID DEHYDROGENASE (LDH): Primary | ICD-10-CM

## 2019-08-29 DIAGNOSIS — R74.01 NONSPECIFIC ELEVATION OF LEVELS OF TRANSAMINASE OR LACTIC ACID DEHYDROGENASE (LDH): ICD-10-CM

## 2019-08-29 DIAGNOSIS — R74.02 NONSPECIFIC ELEVATION OF LEVELS OF TRANSAMINASE OR LACTIC ACID DEHYDROGENASE (LDH): ICD-10-CM

## 2019-08-29 DIAGNOSIS — M25.562 CHRONIC PAIN OF LEFT KNEE: Primary | ICD-10-CM

## 2019-08-29 LAB
ALBUMIN SERPL-MCNC: 4.2 MG/DL (ref 3.3–4.9)
ALP SERPL-CCNC: 62.3 U/L (ref 40–150)
ALT SERPL-CCNC: 20.7 U/L (ref 0–45)
AST SERPL-CCNC: 21.2 U/L (ref 0–55)
BILIRUB SERPL-MCNC: 0.5 MG/DL (ref 0.2–1.3)
BILIRUBIN DIRECT: 0.2 MG/DL (ref 0–0.2)
PROT SERPL-MCNC: 7 G/DL (ref 6.8–8.8)

## 2019-08-29 RX ORDER — ACETAMINOPHEN 500 MG
500 TABLET ORAL EVERY 6 HOURS PRN
Qty: 60 TABLET | Refills: 3 | Status: CANCELLED | OUTPATIENT
Start: 2019-08-29

## 2019-08-29 RX ORDER — GABAPENTIN 300 MG/1
600 CAPSULE ORAL AT BEDTIME
Qty: 60 CAPSULE | Refills: 11 | Status: CANCELLED | OUTPATIENT
Start: 2019-08-29

## 2019-08-29 RX ORDER — ATORVASTATIN CALCIUM 10 MG/1
10 TABLET, FILM COATED ORAL DAILY
Qty: 90 TABLET | Refills: 3 | Status: CANCELLED | OUTPATIENT
Start: 2019-08-29

## 2019-08-29 NOTE — NURSING NOTE
Due to patient being non-English speaking/uses sign language, an  was used for this visit. Only for face-to-face interpretation by an external agency, date and length of interpretation can be found on the scanned worksheet.       name:  Junior Gutierrez  Agency: [employer]  Language: Meaghan  Telephone number:   [phone]  Type of interpretation:  Face-to-face, Spoken

## 2019-08-29 NOTE — PROGRESS NOTES
Preceptor Attestation:   Patient seen, evaluated and discussed with the resident. I personally reviewed the imaging and agree with the interpretation documented by the resident. I have verified the content of the note, which accurately reflects my assessment of the patient and the plan of care.   Supervising Physician:  Gregoria Frances MD

## 2019-08-29 NOTE — PATIENT INSTRUCTIONS
The xrays of your left knee look like you have arthritis.    You can continue to exercise and Tylenol. If the pain is not getting better we can always talk about doing a steroid injection into the knee joint in the future.

## 2019-08-29 NOTE — PROGRESS NOTES
"xr [facility] Clinic Visit    Subjective:  Saw Jagdish is a 64 year old male with a PMHx significant for   Patient Active Problem List   Diagnosis     GERD (gastroesophageal reflux disease)     Back pain     Urinary frequency     Beta-thalassemia (H)     Neuropathy    who presents for follow-up of left knee pain.  Initially knee pain seem like an acute process, but now is been ongoing for months.  He has gone through physical therapy, which has helped somewhat but he continues to have dull aching pain mostly \"inside the knee\", he also points to the joint line.  He continues to do exercises at home, that helped somewhat.  He takes Tylenol when he has severe pain which does help.  He has had no swelling or warmth of the knee.  No fevers.    He also is here for follow-up of lab work.  He was last seen for complete physical, restarted his statin at that time.  Starting statin test liver function test which showed very mild elevation of his transaminases.  He is hep C negative.  He had no abdominal pain, nausea or vomiting, diarrhea or constipation.      ROS:   A complete 12 point review of systems is negative except as stated above in HPI    Objective:  Vitals:    08/29/19 1526   BP: 108/65   Pulse: 55   Resp: 12   Temp: 98.3  F (36.8  C)   TempSrc: Oral   SpO2: 98%   Weight: 59.4 kg (131 lb)     Body mass index is 26.02 kg/m .    GEN: NAD, healthy, alert  EYES: normal conjunctivae/sclerae, no scleral icterus  ABD: soft, NT/ND, no hepatosplenomegaly, no masses, no rebound  MSK: Left knee: Somewhat tender to palpation along joint line.  No effusion, edema, heat or erythema.  No tenderness of patellofemoral tendons.  No popliteal tenderness.  AROM not limited, some pain with flexion and extension.  Negative anterior drawer.  Negative Lockman.  Negative Juan.  SKIN: no suspicious lesions or rashes  PSYCH: mentation appears normal, affect normal/bright    Results for orders placed or performed in visit on " 08/29/19 (from the past 24 hour(s))   Hepatic Panel ([place])   Result Value Ref Range    Albumin 4.2 3.3 - 4.9 mg/dL    Alkaline Phosphatase 62.3 40.0 - 150.0 U/L    ALT 20.7 0.0 - 45.0 U/L    AST 21.2 0.0 - 55.0 U/L    Bilirubin Direct 0.2 0.0 - 0.2 mg/dL    Bilirubin Total 0.5 0.2 - 1.3 mg/dL    Protein Total 7.0 6.8 - 8.8 g/dL       Assessment/Plan:  Saw was seen today for musculoskeletal problem.    Diagnoses and all orders for this visit:    Chronic pain of left knee  The pain is been an evolving picture.  Initially seeming more acute in nature, getting better with conservative measures and physical therapy but now has continued chronic knee pain.  This pain is described as dull and achy, and he notices it most along the joint line.  I wonder if he had some acute exacerbation of chronic knee pain which has gotten better with conservative therapy, now he just with underlying chronic pain.  Patient is an age group where I wonder about osteoarthritis of the knee.  X-ray obtained at this visit shows narrow joint space with some subchondral sclerosis and possibly very small osteophytes by my read.  Offered patient to continue with conservative management including exercises and Tylenol or pursuing intra-articular steroid injection.  At this time he would like to continue conservative measures, will keep injection in mind.  If pain does not improve or worsens he will make an appointment to see me again for possible intra-articular injection.  -     XR Knee Left 3 Views; Future    Nonspecific elevation of levels of transaminase or lactic acid dehydrogenase (LDH)  Previous very mild elevation of transaminases prior to starting statin therapy.  Recheck today and they are well below the limit of normal.  -     Cancel: Liver Panel ([facility] ) - Results < 1hr  -     Hepatic Panel ([place])    Options for treatment and follow-up care were reviewed with the patient who was engaged and actively involved in the decision making  process, verbalized understanding of the options discussed, and satisfied with the final plan.    Patient was staffed with supervising physician, Dr. Frances.     Kalia Montgomery MD PGY2  [facility]

## 2019-08-30 DIAGNOSIS — M25.562 CHRONIC PAIN OF LEFT KNEE: ICD-10-CM

## 2019-08-30 DIAGNOSIS — G89.29 CHRONIC PAIN OF LEFT KNEE: ICD-10-CM

## 2019-11-06 ENCOUNTER — OFFICE VISIT (OUTPATIENT)
Dept: FAMILY MEDICINE | Facility: CLINIC | Age: 64
End: 2019-11-06
Payer: COMMERCIAL

## 2019-11-06 VITALS
HEART RATE: 56 BPM | WEIGHT: 129.6 LBS | SYSTOLIC BLOOD PRESSURE: 107 MMHG | TEMPERATURE: 98.4 F | BODY MASS INDEX: 25.45 KG/M2 | OXYGEN SATURATION: 99 % | HEIGHT: 60 IN | DIASTOLIC BLOOD PRESSURE: 65 MMHG | RESPIRATION RATE: 20 BRPM

## 2019-11-06 DIAGNOSIS — G89.29 CHRONIC PAIN OF LEFT KNEE: Primary | ICD-10-CM

## 2019-11-06 DIAGNOSIS — M25.562 CHRONIC PAIN OF LEFT KNEE: Primary | ICD-10-CM

## 2019-11-06 RX ORDER — TRIAMCINOLONE ACETONIDE 40 MG/ML
40 INJECTION, SUSPENSION INTRA-ARTICULAR; INTRAMUSCULAR ONCE
Status: COMPLETED | OUTPATIENT
Start: 2019-11-06 | End: 2019-11-06

## 2019-11-06 RX ORDER — BUPIVACAINE HYDROCHLORIDE 5 MG/ML
4 INJECTION, SOLUTION PERINEURAL ONCE
Status: COMPLETED | OUTPATIENT
Start: 2019-11-06 | End: 2019-11-06

## 2019-11-06 RX ADMIN — TRIAMCINOLONE ACETONIDE 40 MG: 40 INJECTION, SUSPENSION INTRA-ARTICULAR; INTRAMUSCULAR at 15:34

## 2019-11-06 RX ADMIN — BUPIVACAINE HYDROCHLORIDE 20 MG: 5 INJECTION, SOLUTION PERINEURAL at 15:34

## 2019-11-06 ASSESSMENT — MIFFLIN-ST. JEOR: SCORE: 1219.74

## 2019-11-06 NOTE — NURSING NOTE
Due to patient being non-English speaking/uses sign language, an  was used for this visit. Only for face-to-face interpretation by an external agency, date and length of interpretation can be found on the scanned worksheet.       name:  Junior Gutierrez  Agency: [employer]  Language: Meaghan  Telephone number:   [phone]  Type of interpretation:  Face-to-face, Spoken

## 2019-11-06 NOTE — PROGRESS NOTES
Nursing Notes:   Daria Way  11/6/2019  1:26 PM  Signed  Due to patient being non-English speaking/uses sign language, an  was used for this visit. Only for face-to-face interpretation by an external agency, date and length of interpretation can be found on the scanned worksheet.       name:  Junior Gutierrez  Agency: [employer]  Language: Meaghan  Telephone number:   [phone]  Type of interpretation:  Face-to-face, Spoken  Chief Complaint   Patient presents with     Knee Pain     left knee pain, thinks it is getting worse        Subjective: The patient comes in today to follow-up his left knee pain.  The patient is accompanied by an , who was present throughout the visit.    The patient tells me that his left knee pain has progressively worsened.  He states that nonsteroidal anti-inflammatories helped a little bit.  Likewise, physical therapy helped a little bit.  He notes that the nonsteroidals have been less effective as time has progressed, and that he stopped physical therapy about a month ago.    The patient is watched a YouTube video with exercises with elastic bands, and he has been doing this with some minimal improvement.  The patient is tried ice and heat to his knee and has not found this to be helpful.  The patient does not have any history of trauma or injury to that knee.    The patient describes this as a dull ache.  He notes that the pain is worse with flexion of his knee or with any rotation of his knee.  He also notes that it is worse with weightbearing.  The patient notes that resting the knee seems to help.  The patient has not had any edema or erythema of the knee.  He does favor the knee when he is walking.    The patient does not have any other joints that seem to be effective.  He does not have a fever, headache, dizziness, chills, swelling of the knee, or warmth of the knee.  Her graph the patient does not have any known drug allergies.    The patient's only  "medications include ranitidine, statin, and acetaminophen, and aspirin.        Objective:    Blood pressure 107/65, pulse 56, temperature 98.4  F (36.9  C), temperature source Oral, resp. rate 20, height 1.515 m (4' 11.65\"), weight 58.8 kg (129 lb 9.6 oz), SpO2 99 %.  Body mass index is 25.61 kg/m .    General:  Well nourished, and in no acute distress.  The vital signs are reviewed  Examination of the knee does not show an effusion.  His ACL, PCL, MCL, and LCL, all seem intact by direct confrontation.  There is no tenderness to palpation.  Psych: Euthymic      Assessment and plan:      Chronic pain of left knee  -     Large Joint/Bursa injection and/or drainage - Unilateral (Shoulder, Knee) [20610]  -     Bupivacaine 0.5 % Injection  -     triamcinolone (KENALOG-40) injection 40 mg    Patient and I discussed the risks and benefits of knee injection.  He elects to proceed with injection of his knee.  Patient and I also discussed referral to physical therapy again, or referral to orthopedics.  The patient is not interested in those at this point.    The patient was actively involved in the decision making process, and all the questions were answered to their satisfaction prior to leaving.       PROCEDURE:  JOINT ASPIRATION/INJECTION    After a discussion of risks, benefits and side effects of procedure, informed patient consent was obtained.  The left KNEE was prepped and draped in the usual clean fashion (sterile not required for this procedure) with iodine solution..  ETHYL CHLORIDE was used for local analgesia. Procedure was completed WITHOUT ULTRASOUND.    ASPIRATION: Not performed.    INJECTION:  Using 4 cc of 0.5 % BUPIVOCAINE mixed with 40 mg of TRIAMCINOLONE, the LEFT KNEE was successfully injected without complication.  Patient did not experience some pain relief following injection.    Invasive Procedure Safety Checklist completed by nurse? No, TIME OUT AND CONSENT COMPLETED BY ME  "

## 2019-11-29 DIAGNOSIS — Z91.89 RISK OF MYOCARDIAL INFARCTION OR STROKE 7.5% OR GREATER IN NEXT 10 YEARS: ICD-10-CM

## 2019-12-02 RX ORDER — ATORVASTATIN CALCIUM 10 MG/1
TABLET, FILM COATED ORAL
Qty: 90 TABLET | Refills: 0 | Status: SHIPPED | OUTPATIENT
Start: 2019-12-02 | End: 2020-05-26

## 2019-12-02 RX ORDER — ASPIRIN 81 MG/1
TABLET, COATED ORAL
Qty: 30 TABLET | Refills: 0 | Status: SHIPPED | OUTPATIENT
Start: 2019-12-02 | End: 2020-07-22

## 2020-01-07 DIAGNOSIS — K21.9 GASTROESOPHAGEAL REFLUX DISEASE, ESOPHAGITIS PRESENCE NOT SPECIFIED: ICD-10-CM

## 2020-04-01 ENCOUNTER — TELEPHONE (OUTPATIENT)
Dept: FAMILY MEDICINE | Facility: CLINIC | Age: 65
End: 2020-04-01

## 2020-04-01 NOTE — TELEPHONE ENCOUNTER
Called interpretor Junior Gutierrez to contact the patient to see if he knew about our services. Will call back if the patient had concerns and wants to be seen. Daria Way, CAMMY

## 2020-05-24 DIAGNOSIS — Z91.89 RISK OF MYOCARDIAL INFARCTION OR STROKE 7.5% OR GREATER IN NEXT 10 YEARS: ICD-10-CM

## 2020-05-26 RX ORDER — ATORVASTATIN CALCIUM 10 MG/1
TABLET, FILM COATED ORAL
Qty: 90 TABLET | Refills: 0 | Status: SHIPPED | OUTPATIENT
Start: 2020-05-26 | End: 2021-05-28

## 2020-07-22 DIAGNOSIS — Z91.89 RISK OF MYOCARDIAL INFARCTION OR STROKE 7.5% OR GREATER IN NEXT 10 YEARS: ICD-10-CM

## 2020-07-22 RX ORDER — ASPIRIN 81 MG/1
TABLET, COATED ORAL
Qty: 30 TABLET | Refills: 0 | Status: SHIPPED | OUTPATIENT
Start: 2020-07-22 | End: 2021-05-28

## 2021-03-07 ENCOUNTER — IMMUNIZATION (OUTPATIENT)
Dept: NURSING | Facility: CLINIC | Age: 66
End: 2021-03-07
Payer: COMMERCIAL

## 2021-03-07 PROCEDURE — 91303 PR COVID VAC JANSSEN AD26 0.5ML: CPT

## 2021-03-07 PROCEDURE — 0031A PR COVID VAC JANSSEN AD26 0.5ML: CPT

## 2021-03-25 ENCOUNTER — OFFICE VISIT (OUTPATIENT)
Dept: FAMILY MEDICINE | Facility: CLINIC | Age: 66
End: 2021-03-25
Payer: COMMERCIAL

## 2021-03-25 VITALS
RESPIRATION RATE: 12 BRPM | DIASTOLIC BLOOD PRESSURE: 71 MMHG | SYSTOLIC BLOOD PRESSURE: 122 MMHG | BODY MASS INDEX: 25.49 KG/M2 | OXYGEN SATURATION: 98 % | HEART RATE: 58 BPM | WEIGHT: 129 LBS | TEMPERATURE: 98 F

## 2021-03-25 DIAGNOSIS — L28.0 LICHEN SIMPLEX CHRONICUS: Primary | ICD-10-CM

## 2021-03-25 PROCEDURE — 99213 OFFICE O/P EST LOW 20 MIN: CPT | Mod: GC | Performed by: STUDENT IN AN ORGANIZED HEALTH CARE EDUCATION/TRAINING PROGRAM

## 2021-03-25 RX ORDER — CLOBETASOL PROPIONATE 0.5 MG/G
OINTMENT TOPICAL 2 TIMES DAILY
Qty: 30 G | Refills: 0 | Status: SHIPPED | OUTPATIENT
Start: 2021-03-25 | End: 2021-04-08

## 2021-03-25 NOTE — PROGRESS NOTES
[facility] Clinic Visit    Assessment/Plan:  Manpreet was seen today for derm problem.    Diagnoses and all orders for this visit:    Lichen simplex chronicus  Exam consistent with lichen simplex chronicus. Kenalog was not helpful in the past. Given extent of progression, will treat with short course of clobetasol. Discussed to use small (fingertip) amount and only on the LSC. Advised not to use anywhere else on the body, especially face, groin. He will only apply for 2 weeks then follow up with me at that time. My hope is we can knock down the plaques with clobetasol and then use a medium or low potency PRN maintenance steroid.   -     clobetasol (TEMOVATE) 0.05 % external ointment; Apply topically 2 times daily for 14 days  -     Skin Protectants, Misc. (EUCERIN) cream; Apply topically 3 times daily    Options for treatment and follow-up care were reviewed with the patient who was engaged and actively involved in the decision making process, verbalized understanding of the options discussed, and satisfied with the final plan.    Patient was staffed with supervising physician, Dr. Vega.     Kalia Montgomery MD PGY3  [facility]    Subjective:  Manpreet Almaraz Jagdish is a 66 year old male with a PMHx significant for   Patient Active Problem List   Diagnosis     GERD (gastroesophageal reflux disease)     Back pain     Urinary frequency     Beta-thalassemia (H)     Neuropathy    who presents with chronic leg itching and rash. This has been ongoing for 5 years. Only occurs on shins bilaterally; no other itching or rash. He is currently not doing anything for the itching. Had a cream in the past that was modestly helpful at best. He notes it is worse in the summer time. It never hurts, only itches. Rash gets worse when he itches it.    ROS:   A complete 12-point ROS was obtained and was negative except as stated above in HPI.    Objective:  Vitals:    03/25/21 1404   BP: 122/71   BP Location: Right arm    Patient Position: Sitting   Cuff Size: Adult Regular   Pulse: 58   Resp: 12   Temp: 98  F (36.7  C)   TempSrc: Oral   SpO2: 98%   Weight: 58.5 kg (129 lb)     Body mass index is 25.49 kg/m .    GEN: NAD, healthy, alert  RESP: breathing comfortable on RA  SKIN: hyperpigmented plaques on anterior shins bilaterally. Plaques are thick, partial lichenification. No ulcerations or skin integrity breakdown. No plaques on extensor surfaces of knees/elbows.  PSYCH: mentation appears normal, affect normal/bright    No results found for this or any previous visit (from the past 24 hour(s)).

## 2021-03-25 NOTE — NURSING NOTE
Due to patient being non-English speaking/uses sign language, an  was used for this visit. Only for face-to-face interpretation by an external agency, date and length of interpretation can be found on the scanned worksheet.     name: Yohan  Agency: [employer]  Language: Meaghan   Telephone number: [phone]  Type of interpretation: Telephone, spoken

## 2021-03-25 NOTE — PROGRESS NOTES
Preceptor attestation:  Vital signs reviewed: /71 (BP Location: Right arm, Patient Position: Sitting, Cuff Size: Adult Regular)   Pulse 58   Temp 98  F (36.7  C) (Oral)   Resp 12   Wt 58.5 kg (129 lb)   SpO2 98%   BMI 25.49 kg/m      Patient seen, evaluated, and discussed with the resident.  I have verified the content of the note, which accurately reflects my assessment of the patient and the plan of care.    Supervising physician: Beth Vega MD  [facility]

## 2021-04-12 ENCOUNTER — OFFICE VISIT (OUTPATIENT)
Dept: FAMILY MEDICINE | Facility: CLINIC | Age: 66
End: 2021-04-12
Payer: COMMERCIAL

## 2021-04-12 VITALS
TEMPERATURE: 97.8 F | DIASTOLIC BLOOD PRESSURE: 68 MMHG | HEART RATE: 58 BPM | RESPIRATION RATE: 12 BRPM | WEIGHT: 130 LBS | OXYGEN SATURATION: 97 % | SYSTOLIC BLOOD PRESSURE: 123 MMHG | BODY MASS INDEX: 25.69 KG/M2

## 2021-04-12 DIAGNOSIS — H90.3 BILATERAL SENSORINEURAL HEARING LOSS: ICD-10-CM

## 2021-04-12 DIAGNOSIS — L28.0 LICHEN SIMPLEX CHRONICUS: Primary | ICD-10-CM

## 2021-04-12 PROCEDURE — 99213 OFFICE O/P EST LOW 20 MIN: CPT | Mod: GC | Performed by: STUDENT IN AN ORGANIZED HEALTH CARE EDUCATION/TRAINING PROGRAM

## 2021-04-12 RX ORDER — CETIRIZINE HYDROCHLORIDE 10 MG/1
10 TABLET ORAL DAILY PRN
Qty: 90 TABLET | Refills: 1 | Status: SHIPPED | OUTPATIENT
Start: 2021-04-12 | End: 2021-08-16

## 2021-04-12 RX ORDER — TRIAMCINOLONE ACETONIDE 1 MG/G
CREAM TOPICAL 2 TIMES DAILY PRN
Qty: 45 G | Refills: 1 | Status: SHIPPED | OUTPATIENT
Start: 2021-04-12 | End: 2021-08-16

## 2021-04-12 RX ORDER — AMMONIUM LACTATE 12 G/100G
CREAM TOPICAL 2 TIMES DAILY
Qty: 385 G | Refills: 1 | Status: CANCELLED | OUTPATIENT
Start: 2021-04-12

## 2021-04-12 NOTE — PROGRESS NOTES
Preceptor Attestation:    I discussed the patient with the resident and evaluated the patient in person. I have verified the content of the note, which accurately reflects my assessment of the patient and the plan of care.   Supervising Physician:  Yariel Anderson MD.

## 2021-04-12 NOTE — PATIENT INSTRUCTIONS
STOP using the clobetasol ointment.    Use the Kenalog (triamcinolone) 2 times a day for 2 weeks, then just use as needed.     Continue to use the Eucerin cream at least three times a day every day. You can use the Eucerin as often as you want.     04/14/21   [facility]  Phone: [phone]  Fax: [phone]    Fax demographics, referral and office notes to [phone]. They will contact patient to schedule.     Sruthi Arvizu

## 2021-04-12 NOTE — PROGRESS NOTES
[facility] Clinic Visit    Assessment/Plan:  Saw was seen today for follow up and hearing problem.    Diagnoses and all orders for this visit:    Lichen simplex chronicus  Significantly improved after 2 weeks of clobetasol. He is to stop the clobetasol and transition to kenalog ointment. I instructed him to continue steroid for 2 weeks, then use as needed. Will also ad zyrtec to try to help with itching. Continue eucerin as many times a day as he desires; at least TID  -     triamcinolone (KENALOG) 0.1 % external cream; Apply topically 2 times daily as needed for irritation  -     cetirizine (ZYRTEC) 10 MG tablet; Take 1 tablet (10 mg) by mouth daily as needed for allergies (itching)    Bilateral sensorineural hearing loss  He already has hearing aids, but thinks he needs new ones. Will send new audiology referral; not sure where he was being seen before.   -     AUDIOLOGY ADULT REFERRAL; Future    He will follow up with me for a CPE on 5/17/21    Options for treatment and follow-up care were reviewed with the patient who was engaged and actively involved in the decision making process, verbalized understanding of the options discussed, and satisfied with the final plan.    Patient was staffed with supervising physician, Dr. Anderson.     Kalia Montgomery MD PGY3  [facility]    Subjective:  Manpreet Dubon is a 66 year old male with a PMHx significant for   Patient Active Problem List   Diagnosis     GERD (gastroesophageal reflux disease)     Back pain     Urinary frequency     Beta-thalassemia (H)     Neuropathy     Lichen simplex chronicus    who presents for follow up of lichen simplex chronicus of bilateral anterior shins. He has completed 2 weeks of BID clobetasol. The itching has improved as has the thickened skin on the shins. No adverse reactions from the clobetasol. He again denies skin problems anywhere else on the body. The LSC is not painful. No ulcerations or skin breakdown that he  has noticed.    He has hearing aid, but wonders if he needs new ones. Requests audiology referral.    ROS:   A complete 12-point ROS was obtained and was negative except as stated above in HPI.    Objective:  Vitals:    04/12/21 1431   BP: 123/68   BP Location: Right arm   Patient Position: Sitting   Cuff Size: Adult Regular   Pulse: 58   Resp: 12   Temp: 97.8  F (36.6  C)   TempSrc: Oral   SpO2: 97%   Weight: 59 kg (130 lb)     Body mass index is 25.69 kg/m .    GEN: NAD, healthy, alert  EYES: grossly normal to inspection  RESP: breathing comfortably on RA  MSK: no MSK defects noted, gait is age appropriate w/o ataxia  SKIN: significantly improved LCS on anterior bilateral shins, mild hyperpigmentation. No ulcerations or skin breakdown noted. No excoriations are apparent any longer  PSYCH: mentation appears normal, affect normal/bright    No results found for this or any previous visit (from the past 24 hour(s)).

## 2021-04-12 NOTE — NURSING NOTE
Due to patient being non-English speaking/uses sign language, an  was used for this visit. Only for face-to-face interpretation by an external agency, date and length of interpretation can be found on the scanned worksheet.     name: Anatoly Kaplan  Agency: [employer]  Language: Meaghan   Telephone number: [phone]  Type of interpretation: Telephone, spoken

## 2021-04-15 ENCOUNTER — AMBULATORY - HEALTHEAST (OUTPATIENT)
Dept: ADMINISTRATIVE | Facility: CLINIC | Age: 66
End: 2021-04-15

## 2021-04-15 DIAGNOSIS — H90.3 BILATERAL SENSORINEURAL HEARING LOSS: ICD-10-CM

## 2021-05-17 ENCOUNTER — OFFICE VISIT (OUTPATIENT)
Dept: FAMILY MEDICINE | Facility: CLINIC | Age: 66
End: 2021-05-17
Payer: COMMERCIAL

## 2021-05-17 VITALS
OXYGEN SATURATION: 98 % | SYSTOLIC BLOOD PRESSURE: 109 MMHG | HEART RATE: 56 BPM | HEIGHT: 60 IN | TEMPERATURE: 97.9 F | WEIGHT: 128 LBS | DIASTOLIC BLOOD PRESSURE: 64 MMHG | BODY MASS INDEX: 25.13 KG/M2 | RESPIRATION RATE: 12 BRPM

## 2021-05-17 DIAGNOSIS — L28.0 LICHEN SIMPLEX CHRONICUS: ICD-10-CM

## 2021-05-17 DIAGNOSIS — Z00.00 ROUTINE GENERAL MEDICAL EXAMINATION AT A HEALTH CARE FACILITY: ICD-10-CM

## 2021-05-17 DIAGNOSIS — Z12.11 SCREENING FOR COLON CANCER: ICD-10-CM

## 2021-05-17 LAB
ANION GAP SERPL CALCULATED.3IONS-SCNC: 10 MMOL/L (ref 5–18)
BASOPHILS # BLD: 0.4 THOU/UL (ref 0–0.2)
BASOPHILS NFR BLD MANUAL: 4 % (ref 0–2)
BUN SERPL-MCNC: 13 MG/DL (ref 8–22)
CALCIUM SERPL-MCNC: 9.3 MG/DL (ref 8.5–10.5)
CHLORIDE SERPL-SCNC: 106 MMOL/L (ref 98–107)
CHOLEST SERPL-MCNC: 171 MG/DL
CO2 SERPL-SCNC: 26 MMOL/L (ref 22–31)
CREAT SERPL-MCNC: 0.96 MG/DL (ref 0.7–1.3)
EOSINOPHIL # BLD: 0.6 THOU/UL (ref 0–0.4)
EOSINOPHIL NFR BLD MANUAL: 5 % (ref 0–6)
ERYTHROCYTE [DISTWIDTH] IN BLOOD BY AUTOMATED COUNT: 19 % (ref 11–14.5)
FASTING?: NO
GLUCOSE SERPL-MCNC: 99 MG/DL (ref 70–125)
HBA1C MFR BLD: 5.1 % (ref 4.1–5.7)
HCT VFR BLD AUTO: 40 % (ref 40–54)
HDLC SERPL-MCNC: 40 MG/DL
HGB BLD-MCNC: 11.8 G/DL (ref 14–18)
IMMATURE GRANULOCYTE % - MAN (DIFF): 0 %
IMMATURE GRANULOCYTE ABSOLUTE - MAN (DIFF): 0 THOU/UL
LDLC SERPL CALC-MCNC: 100 MG/DL
LYMPHOCYTES # BLD: 3.2 THOU/UL (ref 0.8–4.4)
LYMPHOCYTES NFR BLD MANUAL: 27 % (ref 20–40)
MCH RBC QN AUTO: 18.5 PG (ref 27–34)
MCHC RBC AUTO-ENTMCNC: 29.5 G/DL (ref 32–36)
MCV RBC AUTO: 63 FL (ref 80–100)
MONOCYTES # BLD: 0.2 THOU/UL (ref 0–0.9)
MONOCYTES NFR BLD MANUAL: 2 % (ref 2–10)
NEUTROPHILS # BLD: 7.4 THOU/UL (ref 2–7.7)
NEUTROPHILS NFR BLD MANUAL: 63 % (ref 50–70)
OVALOCYTES BLD QL SMEAR: ABNORMAL
PLATELET # BLD AUTO: 494 THOU/UL (ref 140–440)
PLATELET BLD QL SMEAR: ABNORMAL
PMV BLD AUTO: 10.7 FL (ref 8.5–12.5)
POLYCHROMASIA BLD QL SMEAR: ABNORMAL
POTASSIUM SERPL-SCNC: 4.7 MMOL/L (ref 3.5–5)
RBC # BLD AUTO: 6.39 MILL/UL (ref 4.4–6.2)
SODIUM SERPL-SCNC: 142 MMOL/L (ref 136–145)
TRIGL SERPL-MCNC: 153 MG/DL
VARIANT LYMPHS BLD QL SMEAR: ABNORMAL
WBC # BLD AUTO: 11.8 THOU/UL (ref 4–11)

## 2021-05-17 PROCEDURE — 83036 HEMOGLOBIN GLYCOSYLATED A1C: CPT | Performed by: STUDENT IN AN ORGANIZED HEALTH CARE EDUCATION/TRAINING PROGRAM

## 2021-05-17 PROCEDURE — 90732 PPSV23 VACC 2 YRS+ SUBQ/IM: CPT | Performed by: STUDENT IN AN ORGANIZED HEALTH CARE EDUCATION/TRAINING PROGRAM

## 2021-05-17 PROCEDURE — 36415 COLL VENOUS BLD VENIPUNCTURE: CPT | Performed by: STUDENT IN AN ORGANIZED HEALTH CARE EDUCATION/TRAINING PROGRAM

## 2021-05-17 PROCEDURE — 99397 PER PM REEVAL EST PAT 65+ YR: CPT | Mod: 25 | Performed by: STUDENT IN AN ORGANIZED HEALTH CARE EDUCATION/TRAINING PROGRAM

## 2021-05-17 PROCEDURE — 90471 IMMUNIZATION ADMIN: CPT | Performed by: STUDENT IN AN ORGANIZED HEALTH CARE EDUCATION/TRAINING PROGRAM

## 2021-05-17 ASSESSMENT — MIFFLIN-ST. JEOR: SCORE: 1199.35

## 2021-05-17 NOTE — PROGRESS NOTES
Male Physical Note      Concerns today: lichen simplex chronicus has improved; he has stopped using the Kenalog ointment. He never picked up Eucron Harding  was used for  this visit.     ROS:                      CONSTITUTIONAL: no fatigue, no unexpected change in weight  SKIN: no worrisome rashes, no worrisome moles, no worrisome lesions  EYES: no acute vision problems or changes  ENT: no ear problems, no mouth problems, no throat problems  RESP: no significant cough, no shortness of breath  CV: no chest pain, no palpitations, no new or worsening peripheral edema  GI: no nausea, no vomiting, no constipation, no diarrhea    No past medical history on file.     Family History   Problem Relation Age of Onset     Diabetes No family hx of      Cancer No family hx of      Heart Disease No family hx of      Coronary Artery Disease No family hx of      Hypertension No family hx of      Hyperlipidemia No family hx of      Breast Cancer No family hx of      Cancer - colorectal No family hx of      Ovarian Cancer No family hx of      Prostate Cancer No family hx of      Other Cancer No family hx of      Mental Illness No family hx of      Cerebrovascular Disease No family hx of      Anesthesia Reaction No family hx of      Asthma No family hx of      Osteoporosis No family hx of      Known Genetic Syndrome No family hx of      Obesity No family hx of      Unknown/Adopted No family hx of      Reviewed no other significant FH           Family History and past Medical History reviewed and unchanged/updated.    Social History     Tobacco Use     Smoking status: Never Smoker     Smokeless tobacco: Never Used   Substance Use Topics     Alcohol use: No       Children ? Yes; 3    Has anyone hurt you physically, for example by pushing, hitting, slapping or kicking you or forcing you to have sex? Denies  Do you feel threatened or controlled by a partner, ex-partner or anyone in your life? Denies    RISK  "BEHAVIORS AND HEALTHY HABITS:  Tobacco Use/Smoking: None  Illicit Drug Use: None  ETOH: None  Do you use alcohol? No  Sexually Active: No  Diet (5-7 servings of fruits/veg daily): Yes   Exercise (30 min accumulated most days):No  Dental Care: No   Calcium 1500 mg/d:  No  Seat Belt Use: Yes     Cholesterol Level (>46 yo or at risk):  Recommended and patient accepted testing., ASA use (>3% risk in 5 y):  TBD based on labs and HIV screening:  Recommended and patient declined testing.  Colon CA Screening (>50-75 ):  Recommended and patient accepted testing.  Prostate screening discussed and patient informed that risks of screening may exceed benefits and Patient declined PSA screening.    CV Risk based on Pooled Cohort Risk:TBD based on labwork       Immunization History   Administered Date(s) Administered     COVID-19,PF,Randi 03/07/2021     HepB 01/18/2013, 03/21/2013, 05/29/2014     Influenza (IIV3) PF 10/09/2019     Influenza Vaccine, 6+MO IM (QUADRIVALENT W/PRESERVATIVES) 11/07/2014, 10/27/2015, 11/10/2017     MMR 08/27/2013, 05/05/2014     Pneumococcal 23 valent 05/17/2021     TD (ADULT, 7+) 01/18/2013, 05/05/2014     Tdap (Adacel,Boostrix) 08/27/2013     Reviewed Immunization Record Today    EXAMINATION:  /64 (BP Location: Left arm, Patient Position: Sitting, Cuff Size: Adult Regular)   Pulse 56   Temp 97.9  F (36.6  C) (Oral)   Resp 12   Ht 1.51 m (4' 11.45\")   Wt 58.1 kg (128 lb)   SpO2 98%   BMI 25.46 kg/m    GENERAL: healthy, alert and no distress  EYES: Eyes grossly normal to inspection, extraocular movements - intact, and PERRL  HENT: ear canals- normal; TMs- normal; Nose- normal; Mouth- no ulcers, no lesions  NECK: no tenderness, no adenopathy, no asymmetry, no masses, no stiffness; thyroid- normal to palpation  RESP: lungs clear to auscultation - no rales, no rhonchi, no wheezes  CV: regular rates and rhythm, normal S1 S2, no S3 or S4 and no murmur, no click or rub -  ABDOMEN: soft, no " tenderness, no  hepatosplenomegaly, no masses, normal bowel sounds  MS: extremities- no gross deformities noted, no edema  SKIN: no suspicious lesions, no rashes. Significantly improved LSC on bilateral shins  NEURO: strength and tone- normal, sensory exam- grossly normal, mentation- intact, speech- normal, reflexes- symmetric  BACK: no CVA tenderness, no paralumbar tenderness  - Declnied exam  PSYCH: Alert and oriented times 3; speech- coherent , normal rate and volume; able to articulate logical thoughts, able to abstract reason, no tangential thoughts, no hallucinations or delusions, affect- normal  LYMPHATICS: ant. cervical- normal, post. cervical- normal, axillary- normal,    ASSESSMENT & PLAN:  1. Routine general medical examination at a health care facility  - CBC w/ Diff and Plt ([facility])  - Hemoglobin A1c ([facility])  - Basic Metabolic Profile ([facility])  - Lipid Bacova ([facility]) - Results > 1 hr  - GASTROENTEROLOGY ADULT REF PROCEDURE ONLY; Future    2. Screening for colon cancer  - GASTROENTEROLOGY ADULT REF PROCEDURE ONLY; Future    3. Lichen simplex chronicus  Much improved. Will try another rx for Eucerin. PRN kenalog.   - Ivan Protect (EUCERIN) external cream; Apply topically 3 times daily  Dispense: 142 g; Refill: 3    Follow up based on lab results.     Patient discussed with attending physician Dr. Anderson who agrees with the plan.     Kalia Montgomrey MD PGY3

## 2021-05-17 NOTE — NURSING NOTE
Due to patient being non-English speaking/uses sign language, an  was used for this visit. Only for face-to-face interpretation by an external agency, date and length of interpretation can be found on the scanned worksheet.     name: Godfrey Kay  Agency: [employer]  Language: Meaghan   Telephone number:   Type of interpretation: Face-to-face, spoken

## 2021-05-17 NOTE — PATIENT INSTRUCTIONS
ASK THE PHARMACY FOR A SHINGLES SHOT      Preventive Health Recommendations:     See your health care provider every year to    Review health changes.     Discuss preventive care.      Review your medicines if your doctor has prescribed any.      Talk with your health care provider about whether you should have a test to screen for prostate cancer (PSA).    Every 3 years, have a diabetes test (fasting glucose). If you are at risk for diabetes, you should have this test more often.    Every 5 years, have a cholesterol test. Have this test more often if you are at risk for high cholesterol or heart disease.     Every 10 years, have a colonoscopy. Or, have a yearly FIT test (stool test). These exams will check for colon cancer.    Talk to with your health care provider about screening for Abdominal Aortic Aneurysm if you have a family history of AAA or have a history of smoking.    Shots:     Get a flu shot each year.     Get a tetanus shot every 10 years.     Talk to your doctor about your pneumonia vaccines. There are now two you should receive - Pneumovax (PPSV 23) and Prevnar (PCV 13).     Talk to your pharmacist about a shingles vaccine.     Talk to your doctor about the hepatitis B vaccine.  Nutrition:     Eat at least 5 servings of fruits and vegetables each day.     Eat whole-grain bread, whole-wheat pasta and brown rice instead of white grains and rice.     Get adequate Calcium and Vitamin D.   Lifestyle    Exercise for at least 150 minutes a week (30 minutes a day, 5 days a week). This will help you control your weight and prevent disease.     Limit alcohol to one drink per day.     No smoking.     Wear sunscreen to prevent skin cancer.    See your dentist every six months for an exam and cleaning.    See your eye doctor every 1 to 2 years to screen for conditions such as glaucoma, macular degeneration, cataracts, etc.    Personalized Prevention Plan  You are due for the preventive services outlined below.   Your care team is available to assist you in scheduling these services.  If you have already completed any of these items, please share that information with your care team to update in your medical record.  Health Maintenance Due   Topic Date Due     Discuss Advance Care Planning  Never done     Pneumococcal Vaccine (1 of 4 - PCV13) Never done     Pneumococcal Vaccine (1 of 4 - PCV13) Never done     Zoster (Shingles) Vaccine (1 of 2) Never done     FALL RISK ASSESSMENT  Never done     AORTIC ANEURYSM SCREENING (SYSTEM ASSIGNED)  Never done     Colorectal Cancer Screening  07/16/2020 05/20/21   GASTROENTEROLOGY REFERRAL  [facility]  Phone [phone]  Fax: [phone]    Online referral placed with [facility] who will contact patient to schedule.     Sruthi Arvizu

## 2021-05-18 NOTE — RESULT ENCOUNTER NOTE
[facility]--    Could you call this patient for me and have him schedule an in person visit to talk about his results from his complete physical? This can be done at anytime in the next week or two; no rush. There are just a lot of results and it would probably be easier to do in person.     Thanks!  Kalia

## 2021-05-28 ENCOUNTER — OFFICE VISIT (OUTPATIENT)
Dept: FAMILY MEDICINE | Facility: CLINIC | Age: 66
End: 2021-05-28
Payer: COMMERCIAL

## 2021-05-28 VITALS
BODY MASS INDEX: 25.29 KG/M2 | WEIGHT: 128.8 LBS | TEMPERATURE: 97.7 F | SYSTOLIC BLOOD PRESSURE: 106 MMHG | DIASTOLIC BLOOD PRESSURE: 60 MMHG | RESPIRATION RATE: 14 BRPM | OXYGEN SATURATION: 98 % | HEART RATE: 50 BPM | HEIGHT: 60 IN

## 2021-05-28 DIAGNOSIS — D75.839 THROMBOCYTOSIS: ICD-10-CM

## 2021-05-28 DIAGNOSIS — D50.9 MICROCYTIC ANEMIA: Primary | ICD-10-CM

## 2021-05-28 DIAGNOSIS — Z91.89 RISK OF MYOCARDIAL INFARCTION OR STROKE 7.5% OR GREATER IN NEXT 10 YEARS: ICD-10-CM

## 2021-05-28 DIAGNOSIS — D72.824 BASOPHILIA: ICD-10-CM

## 2021-05-28 DIAGNOSIS — D56.3 BETA THALASSEMIA TRAIT: ICD-10-CM

## 2021-05-28 LAB
FERRITIN SERPL-MCNC: 105 NG/ML (ref 27–300)
IRON SATN MFR SERPL: 31 % (ref 20–50)
IRON SERPL-MCNC: 76 UG/DL (ref 42–175)
IRON SERPL-MCNC: 76 UG/DL (ref 42–175)
TIBC SERPL-MCNC: 249 UG/DL (ref 313–563)
TRANSFERRIN SERPL-MCNC: 199 MG/DL (ref 212–360)

## 2021-05-28 PROCEDURE — 36415 COLL VENOUS BLD VENIPUNCTURE: CPT | Performed by: STUDENT IN AN ORGANIZED HEALTH CARE EDUCATION/TRAINING PROGRAM

## 2021-05-28 PROCEDURE — 99213 OFFICE O/P EST LOW 20 MIN: CPT | Mod: GC | Performed by: STUDENT IN AN ORGANIZED HEALTH CARE EDUCATION/TRAINING PROGRAM

## 2021-05-28 RX ORDER — ATORVASTATIN CALCIUM 20 MG/1
20 TABLET, FILM COATED ORAL DAILY
Qty: 90 TABLET | Refills: 3 | Status: SHIPPED | OUTPATIENT
Start: 2021-05-28

## 2021-05-28 ASSESSMENT — MIFFLIN-ST. JEOR: SCORE: 1202.98

## 2021-05-28 NOTE — PROGRESS NOTES
[facility] Clinic Visit    Assessment/Plan:  Manpreet was seen today for recheck.    Diagnoses and all orders for this visit:    Microcytic anemia  Beta thalassemia trait  Basophilia  Thrombocytosis (H)  He does have a history of beta thal trait, but will perform iron studies to assure he is not iron deficient. He also has already scheduled his colonoscopy. His CBC was also with mild leukocytosis and thrombocytosis. Diff with basophilia and eosinophilia. He has no B symptoms. This is possibly all reactive, but would not want to miss hematologic malignancy and therefore will obtain peripheral smear.   -     Iron ([facility])  -     Iron Transferrin Saturat ([facility])  -     Ferritin ([facility])  -     Reticulocyte Count  -     Cancel: CBC with Diff Plt ([facility])  -     CBC w/ Diff and Plt ([facility])  -     Morphology Periph. Blood ([facility]) (Must order CBC with all morphology labs)  -     CBC w/ Diff and Plt ([facility])    Risk of myocardial infarction or stroke 7.5% or greater in next 10 years  He has not been taking statin or ASA. His ASCVD risk is 11% based on recent lab work and he would benefit from both of these to reduce cardiovascular events. Through shared decision making, he decided that he wanted to restart these medications.   -     atorvastatin (LIPITOR) 20 MG tablet; Take 1 tablet (20 mg) by mouth daily  -     aspirin (ASPIRIN LOW DOSE) 81 MG EC tablet; Take 1 tablet (81 mg) by mouth daily      Options for treatment and follow-up care were reviewed with the patient who was engaged and actively involved in the decision making process, verbalized understanding of the options discussed, and satisfied with the final plan.    Patient was staffed with supervising physician, Dr. Purdy.     Kalia Montgomery MD PGY3  [facility]    Subjective:  Manpreet Almaraz Jagdish is a 66 year old male with a PMHx significant for   Patient Active Problem List   Diagnosis     GERD (gastroesophageal reflux  "disease)     Back pain     Urinary frequency     Neuropathy     Lichen simplex chronicus     Beta thalassemia trait    who presents for follow up of labs. He was seen for CPE last week. He has no acute complaints today. Denies chest pain, SOB. He has no notable fatigue, denies easy bruising. No weight loss, night sweats. No melena or hematochezia.    He has his follow up colonoscopy scheduled with [facility] for July. 2015 path report was tubular adenoma.      ROS:   A complete 12-point ROS was obtained and was negative except as stated above in HPI.    Objective:  Vitals:    05/28/21 1429 05/28/21 1433   BP: 106/60    BP Location: Right arm    Patient Position: Sitting    Cuff Size: Adult Small    Pulse: (!) 49 50   Resp: 14    Temp: 97.7  F (36.5  C)    TempSrc: Oral    SpO2: 96% 98%   Weight: 58.4 kg (128 lb 12.8 oz)    Height: 1.51 m (4' 11.45\")      Body mass index is 25.62 kg/m .    GEN: NAD, healthy, alert  EYES: grossly normal to inspection  NECK: no LAD  RESP: CTAB, no w/r/r  CV: RRR, nl S1/S2, no S3/S4, no m/r/g, no peripheral edema, peripheral pulses strong  PSYCH: mentation appears normal, affect normal/bright    No results found for this or any previous visit (from the past 24 hour(s)).      "

## 2021-05-28 NOTE — LETTER
"June 28, 2021      [facility]  [address]  [place] [address]        Dear ,    We are writing to inform you of your test results.    Based on your recent lab work, it looks like your hemoglobin (red blood cells) is low because of your underlying thalassemia which we already know about. Thalassemia is a genetic condition in which you normally make \"smaller\" hemoglobin. Your iron levels looked okay. We don't need to do anything for treatment, but it would be a good idea to periodically check hemoglobin. I think a visit in about 3 months time would be a good idea. Of course, it is always okay to come in sooner if needed.     Please reach out with any questions!       Sincerely,     Kalia Montgomery MD       Resulted Orders   Iron ([facility])   Result Value Ref Range    Iron 76 42 - 175 ug/dL    Narrative    Test performed by:  [facility]  [address] [place] [address]   Iron Transferrin Saturat ([facility])   Result Value Ref Range    Iron 76 42 - 175 ug/dL    Transferrin 199 (L) 212 - 360 mg/dL    Transferrin Saturation 31 20 - 50 %    Transferrin  (L) 313 - 563 ug/dL    Narrative    Test performed by:  [facility]  [address] [place] [address]   Ferritin ([facility])   Result Value Ref Range    Ferritin 105 27 - 300 ng/mL    Narrative    Test performed by:  [facility]  [address] [place] [address]   Morphology Periph. Blood ([facility]) (Must order CBC with all morphology labs)   Result Value Ref Range    Morphology, Peripheral Blood See Separate Pathology Report (A) (none)    WBC 12.8 (H) 4.0 - 11.0 thou/uL    RBC 6.39 (H) 4.40 - 6.20 mill/uL    Hemoglobin 12.2 (L) 14.0 - 18.0 g/dL    Hematocrit 40.1 40.0 - 54.0 %    MCV 63 (L) 80 - 100 fL    MCH 19.1 (L) 27.0 - 34.0 pg    MCHC 30.4 (L) 32.0 - 36.0 g/dL    RDW 19.2 (H) 11.0 - 14.5 %    Platelets 487 (H) 140 - 440 thou/uL    " Mean Platelet Volume 10.6 8.5 - 12.5 fL    Narrative    Test performed by:  [facility]  [address] [place] [address]   Perip Blood Morph ([facility])   Result Value Ref Range    Lab AP Case Report SEE RESULTS BELOW       Comment:      Peripheral Blood Morphology Report                Case: [id number]                                     Authorizing Provider:  Riley Purdy MD           Collected:             05/28/2021 1513              Ordering Location:     [facility]      Received:              05/29/2021 0747                                     [facility]                                                                                     Laboratory                                                                     Pathologist:           Kaleb Lyons MD                                                          Specimen:    Peripheral Blood                                                                             Lab AP Final Diagnosis SEE RESULTS BELOW       Comment:      PERIPHERAL BLOOD:       -  MIXED LEUKOCYTOSIS       -  MICROCYTIC-HYPOCHROMIC ANEMIA WITH ERYTHROCYTOSIS (HISTORY OF NORMAL   IRON STORES)      -  MILD THROMBOCYTOSIS  Electronically signed by Kaleb Lyons MD on 6/1/2021 at 12:17 PM      Lab AP Diagnosis Comment SEE RESULTS BELOW       Comment:      The clinical history has been reviewed. Although other causes of ineffective   erythropoiesis should be ruled out, the features are suggestive of an   underlying disorder of globin synthesis versus anemia of chronic disease, and   hemoglobin studies can be performed on the current specimen, if requested.   Please correlate with family history and iron studies, if appropriate.  Thrombocytosis can be a reaction to hemorrhage, iron deficiency, post   splenectomy, chronic inflammatory states, malignancies, or drugs (vincristine,   high-dose erythropoietin) or due to an intrinsic stem  cell defect (e.g.,   essential thrombocythemia). Please correlate with clinical findings.     If there is clinical suspicion for a myeloproliferative neoplasm, JAK2 (Janus   Kinase 2 gene), MPL (thrombopoietin receptor gene), and CALR (calreticulin)   mutation analysis can be performed on a subsequent peripheral blood specimen.   The JAK2 V617F is present in 95% to 98% of polycythemia vera, 50% to 60% of   nemo leland myelofibrosis (PMF), and 50% to 60% of essential thrombocythemia (ET).   It has also been described infrequently in other myeloid neoplasms, including   chronic myelomonocytic leukemia and myelodysplastic syndrome. This mutation   is not seen in chronic myelogenous leukemia (CML) or in reactive conditions   with elevated blood counts. Detection of the JAK2 V617F is useful to help   establish the diagnosis of MPN. However, a negative JAK2 V617F result does not   indicate absence of an MPN. Other important molecular markers in   AWE-[id number]-[id number] MPN include CALR exon 9 mutation (20%-30% of PMF and ET) and   MPL exon 10 mutation (5%-10% of PMF and 3%-5% of ET). Mutations in JAK2,   CALR, and MPL are essentially mutually exclusive.  The causes of reactive neutrophilia are numerous and range from infection to   metabolic defects. Bacterial infections are the predominant cause of   neutrophilia; other causes include therapeutic or endogenous drugs/hormones,   acute stress, acute tissue  necrosis and other infectious/inflammatory   processes, including collagen vascular disorders and autoimmune disease.   Absolute neutrophilia is seen in a variety of hematopoietic neoplasms,   especially myeloproliferative disorders. Recommend clinical correlation;   consider repeat CBC after resolution of any possible infection. If a diagnosis   of CML is suspected, evaluation of the peripheral blood for the presence of   the [place] chromosome and/or the BCR/ABL fusion gene is suggested.  Eosinophilia is most often seen  in hypersensitivity reactions and is commonly   associated with parasitic infection, fungal infection, sarcoidosis, autoimmune   disorders, and allergic pulmonary infiltrates. Drugs may cause eosinophilia.   Eosinophilia is also a finding associated with neoplastic disorders.      Lab AP Clinical Information D50.9     Lap AP Peripheral Smear SEE RESULTS BELOW       Comment:      Red blood cells are increased in number and overall markedly microcytic and   hypochromic. Anisopoikilocytosis and polychromasia are markedly increased, but   rouleaux formation does not appear prominent.  The white blood cell count and differential appear as reported on the CBC.   Leukocytes are increased in number and demonstrate an absolute   neutrophilia/eosinophilia. No blasts or dysplastic changes are identified.  Platelets are increased in number but overall normal in appearance.      Lab AP Charges SEE RESULTS BELOW       Comment:      CPT: 61073  ICD-10: D64.9      Narrative    Test performed by:  [facility] [address] [place] [address]     If you have any questions or concerns, please call the clinic at the number listed above.

## 2021-05-28 NOTE — NURSING NOTE
Due to patient being non-English speaking/uses sign language, an  was used for this visit. Only for face-to-face interpretation by an external agency, date and length of interpretation can be found on the scanned worksheet.     name: CUBA HOGAN (ID [id number])   Agency: [facility]   Language: Meaghan   Telephone number:   Type of interpretation: Telephone, spoken

## 2021-05-28 NOTE — PROGRESS NOTES
Preceptor Attestation:    I discussed the patient with the resident and evaluated the patient in person. I have verified the content of the note, which accurately reflects my assessment of the patient and the plan of care.   Supervising Physician:  Riley Purdy MD.

## 2021-05-29 LAB
BASOPHILS # BLD: 0.2 THOU/UL (ref 0–0.2)
BASOPHILS NFR BLD MANUAL: 2 % (ref 0–2)
EOSINOPHIL # BLD: 0.6 THOU/UL (ref 0–0.4)
EOSINOPHIL NFR BLD MANUAL: 5 % (ref 0–6)
ERYTHROCYTE [DISTWIDTH] IN BLOOD BY AUTOMATED COUNT: 19.2 % (ref 11–14.5)
HCT VFR BLD AUTO: 40.1 % (ref 40–54)
HGB BLD-MCNC: 12.2 G/DL (ref 14–18)
IMMATURE GRANULOCYTE % - MAN (DIFF): 0 %
IMMATURE GRANULOCYTE ABSOLUTE - MAN (DIFF): 0 THOU/UL
LYMPHOCYTES # BLD: 2.9 THOU/UL (ref 0.8–4.4)
LYMPHOCYTES NFR BLD MANUAL: 23 % (ref 20–40)
MCH RBC QN AUTO: 19.1 PG (ref 27–34)
MCHC RBC AUTO-ENTMCNC: 30.4 G/DL (ref 32–36)
MCV RBC AUTO: 63 FL (ref 80–100)
MONOCYTES # BLD: 0.3 THOU/UL (ref 0–0.9)
MONOCYTES NFR BLD MANUAL: 3 % (ref 2–10)
MORPHOLOGY, PERIPHERAL BLOOD: ABNORMAL
NEUTROPHILS # BLD: 8.8 THOU/UL (ref 2–7.7)
NEUTROPHILS NFR BLD MANUAL: 69 % (ref 50–70)
PLATELET # BLD AUTO: 487 THOU/UL (ref 140–440)
PLATELET BLD QL SMEAR: ABNORMAL
PMV BLD AUTO: 10.6 FL (ref 8.5–12.5)
POLYCHROMASIA BLD QL SMEAR: ABNORMAL
RBC # BLD AUTO: 6.39 MILL/UL (ref 4.4–6.2)
WBC # BLD AUTO: 12.8 THOU/UL (ref 4–11)

## 2021-05-30 NOTE — PROGRESS NOTES
[facility] Daily Progress     Patient Name: Manpreet Dubon  Date: 2019  Visit #: 2  PTA visit #:  -  Referral Diagnosis:   Acute pain of left knee [M25.562]  - Primary       Referring provider: Kalia Montgomery MD  Visit Diagnosis:     ICD-10-CM    1. Acute pain of left knee M25.562      Manpreet Dubon is a 64 y.o. male who presents to therapy today with chief complaints of acute left knee pain that started in 2019. The patient reports difficulty with walking, standing, and stairs due to pain. With examination, the patient demonstrated decreased knee ROM, - stability testing, but tightness in quad, hamstring, and ITB on the left. He also had some hip weakness on that side. The patient would likely benefit from skilled PT to improve his strength, ROM, pain, and overall function.     Assessment:     HEP/POC compliance is  good .     Patient shows good improvements in LE mobility and reporting some improvements in pain. The patient did well today with MT and progression of strengthening. Will benefit from continue progression of hip and knee strengthening.     Goal Status:  Pt. will be independent with home exercise program in : 4 weeks  Pt. will report decreased intensity, frequency of : Pain;in 6 weeks;Comment  Comment:: 50%    Pt will: be able to be more active, less pain with walking in 4 weeks:  Pt will: be able to do his stairs without increased pain in 4 weeks:      Plan / Patient Education:     Continue with initial plan of care.     Plan for next visit: Review HEP, progress LE strengthening with lateral hip strengthening, step ups.     Subjective:     Pain Ratin    The knee is feeling a little bit better. It feels more flexible with the knee. Still tough going from sitting to standing.    Objective:     Prone Knee Flexion (R/L)  130 deg/110 deg initial  135 deg/125 deg today    85/76 SLR    Modified ITB stretch to standing for home compliance    No increase in pain with mini lunges or  squats.    Exercises:  Exercise #1: Quad, supine HS, SL ITB stretching X 15 seconds each  Comment #1: SLR X 10-20  Exercise #2: Mini squats x 10   Comment #2: Mini lunges X 10   Exercise #3: ITB standing stretch X 30 seconds.    Treatment Today     TREATMENT MINUTES COMMENTS   Evaluation     Self-care/ Home management     Manual therapy 9 ITB STM, seated knee distraction with oscillations and flex/ext mobs.    Neuromuscular Re-education     Therapeutic Activity     Therapeutic Exercises 18 Review and progression of HEP.   Gait training     Modality__________________                Total 27    Blank areas are intentional and mean the treatment did not include these items.       Faustino EVERETT  7/23/2019

## 2021-05-31 NOTE — PROGRESS NOTES
[facility] Daily Progress     Patient Name: aMnpreet Dubon  Date: 8/15/2019  Visit #: 4  PTA visit #:    Referral Diagnosis:   Acute pain of left knee [M25.562]  - Primary       Referring provider: Kalia Montgomery MD  Visit Diagnosis:     ICD-10-CM    1. Acute pain of left knee M25.562      Manpreet Dubon is a 64 y.o. male who presents to therapy today with chief complaints of acute left knee pain that started in 2019. The patient reports difficulty with walking, standing, and stairs due to pain. With examination, the patient demonstrated decreased knee ROM, - stability testing, but tightness in quad, hamstring, and ITB on the left. He also had some hip weakness on that side. The patient would likely benefit from skilled PT to improve his strength, ROM, pain, and overall function.     Assessment:     HEP/POC compliance is  good .     Patient is doing well with all of the exercises, he is reporting that pain is improving and he is comfortable continue with these on his own. He has made good progress towards or met all of his PT goals and is now even considering return to work. He also is showing objective improvements in his knee ROM and HS/Quad flexibility.The patient is appropriate to continue with HEP independently and return as needed.    Goal Status:  Pt. will be independent with home exercise program in : 4 weeks Met  Pt. will report decreased intensity, frequency of : Pain;in 6 weeks;Comment  Comment:: 50% Met  Pt will: be able to be more active, less pain with walking in 4 weeks: Met  Pt will: be able to do his stairs without increased pain in 4 weeks: Met- still painful.      Plan / Patient Education:     Patient to continue independently with HEP. D/C in 4 weeks.     Subjective:   Pain ratin    A little bit better since starting PT. Still pain having pain in the knee, feels it inside the knee. Likes doing the exercises, he does feel better after doing them.  Stairs are better and now can walk 25  minutes well. Goal is to get to 30 minutes.    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 Thinks he could do a shorter day vs 12 hours work day.    Objective:     L flexion ROM: 140 degrees (painfree)    90 deg SLR on L    Non-antalgic gait into clinic.    Exercises:  Exercise #1: Quad, supine HS, SL ITB stretching X 15 seconds each  Comment #1: SLR X 10-20  Exercise #2: Mini squats x 10   Comment #2: Mini lunges X 10   Exercise #3: ITB standing stretch X 30 seconds.  Exercise #4: clamshell B  Comment #4: x10  Exercise #5: SL hip AB  Comment #5: x10  Exercise #6: 8' fwd step ups   Comment #6: x10  Exercise #7: TKE purple X 10     Treatment Today     TREATMENT MINUTES COMMENTS   Evaluation     Self-care/ Home management     Manual therapy Not today ITB STM, seated knee distraction with oscillations and flex/ext mobs.    Neuromuscular Re-education     Therapeutic Activity     Therapeutic Exercises 24 Review and progression of HEP.  See flow sheet  Added to HEP:  -clamshell  -hip AB SL  -fwd step ups   Gait training     Modality__________________                Total 24    Blank areas are intentional and mean the treatment did not include these items.       Faustino EVERETT, PT  8/15/2019

## 2021-05-31 NOTE — PROGRESS NOTES
"[facility] Daily Progress     Patient Name: Manpreet Dubon  Date: 7/31/2019  Visit #: 3  PTA visit #:  1  Referral Diagnosis:   Acute pain of left knee [M25.562]  - Primary       Referring provider: Kalia Montgomery MD  Visit Diagnosis:     ICD-10-CM    1. Acute pain of left knee M25.562      Manpreet Dubon is a 64 y.o. male who presents to therapy today with chief complaints of acute left knee pain that started in June 2019. The patient reports difficulty with walking, standing, and stairs due to pain. With examination, the patient demonstrated decreased knee ROM, - stability testing, but tightness in quad, hamstring, and ITB on the left. He also had some hip weakness on that side. The patient would likely benefit from skilled PT to improve his strength, ROM, pain, and overall function.     Assessment:     HEP/POC compliance is  good .     Patient is doing well with all of the exercises. He tolerated the new exercises that were added today.  Initial goals remain appropriate.     Goal Status: On going  Pt. will be independent with home exercise program in : 4 weeks  Pt. will report decreased intensity, frequency of : Pain;in 6 weeks;Comment  Comment:: 50%    Pt will: be able to be more active, less pain with walking in 4 weeks:  Pt will: be able to do his stairs without increased pain in 4 weeks:      Plan / Patient Education:     Continue with initial plan of care.     Plan for next visit: Review HEP, progress LE strengthening with lateral hip strengthening, step ups.     Subjective:      A little bit better. \" I'm still feeling the pain.\"  Paiin remains constant.  Pt reports continued pain with sit to stand  Pain rating:-4-5        Objective:     L flexion ROM: 140 degrees (painfree)    85/76 SLR        Exercises:  Exercise #1: Quad, supine HS, SL ITB stretching X 15 seconds each  Comment #1: SLR X 10-20  Exercise #2: Mini squats x 10   Comment #2: Mini lunges X 10   Exercise #3: ITB standing stretch X 30 " seconds.  Exercise #4: gerry B  Comment #4: x10  Exercise #5: SL hip AB  Comment #5: x10  Exercise #6: 8' fwd step ups   Comment #6: x10    Treatment Today     TREATMENT MINUTES COMMENTS   Evaluation     Self-care/ Home management     Manual therapy Not today ITB STM, seated knee distraction with oscillations and flex/ext mobs.    Neuromuscular Re-education     Therapeutic Activity     Therapeutic Exercises 25 Review and progression of HEP.  See flow sheet  Added to HEP:  -gerry  -hip AB SL  -fwd step ups   Gait training     Modality__________________                Total 25    Blank areas are intentional and mean the treatment did not include these items.       Laurie Vázquez,GINA  7/31/2019

## 2021-06-01 ENCOUNTER — RECORDS - HEALTHEAST (OUTPATIENT)
Dept: ADMINISTRATIVE | Facility: OTHER | Age: 66
End: 2021-06-01

## 2021-06-01 LAB
LAB AP DIAGNOSIS COMMENT: NORMAL
LAP AP PERIPHERAL SMEAR: NORMAL
PATH REPORT.COMMENTS IMP SPEC: NORMAL
PATH REPORT.COMMENTS IMP SPEC: NORMAL
PATH REPORT.FINAL DX SPEC: NORMAL
PATH REPORT.RELEVANT HX SPEC: NORMAL

## 2021-06-07 NOTE — PROGRESS NOTES
[facility] Discharge Summary  Patient Name: Manpreet CARABALLO Jagdish  Date: 4/7/2020  Referral Diagnosis:   Acute pain of left knee [M25.562]  - Primary       Referring provider: Kalia Montgomery MD  Visit Diagnosis:   1. Acute pain of left knee         Goals:  Pt. will be independent with home exercise program in : 4 weeks Met  Pt. will report decreased intensity, frequency of : Pain;in 6 weeks;Comment  Comment:: 50% Met  Pt will: be able to be more active, less pain with walking in 4 weeks: Met  Pt will: be able to do his stairs without increased pain in 4 weeks: Met- still painful.    Patient was seen for 4 visits from 7/16/2019 to 8/15/2019 with - missed appointments.    The patient met goals and has demonstrated understanding of and independence in the home program for self-care, and progression to next steps.  He will initiate contact if questions or concerns arise.    Therapy will be discontinued at this time.  The patient will need a new referral to resume.    Thank you for your referral.  Faustino EVERETT, PT  4/7/2020  12:12 PM

## 2021-06-17 NOTE — PATIENT INSTRUCTIONS - HE
Patient Instructions by Faustino Benedict PT at 8/15/2019 10:00 AM     Author: Faustino Benedict PT Service: -- Author Type: Physical Therapist    Filed: 8/15/2019 10:23 AM Encounter Date: 8/15/2019 Status: Signed    : Faustino Benedict PT (Physical Therapist)        TERMINAL KNEE EXTENSION - TKE    Start in a standing position with an elastic band attached to your slightly bent knee.  (Your toes should be touching the floor)    Next, draw your knee back towards a straightened position so that your heel touches the floor.    Hold 3-5 seconds  10X/side  1X/day

## 2021-06-17 NOTE — PATIENT INSTRUCTIONS - HE
Patient Instructions by Laurie Isbell PTA at 7/31/2019 12:30 PM     Author: Laurie Isbell PTA Service: -- Author Type: Physical Therapist Assistant    Filed: 7/31/2019 12:43 PM Encounter Date: 7/31/2019 Status: Signed    : Laurie Isbell PTA (Physical Therapist Assistant)        CLAM SHELLS    While lying on your side with your knees bent, draw up the top knee while keeping contact of your feet together.    Do not let your pelvis roll back during the lifting movement.        HIP ABDUCTION - SIDELYING    While lying on your side, slowly raise up your top leg to the side. Keep your knee straight and maintain your toes pointed forward the entire time.     The bottom leg can be bent to stabilize your body.

## 2021-06-17 NOTE — PATIENT INSTRUCTIONS - HE
Patient Instructions by Faustino Benedict PT at 7/16/2019  3:30 PM     Author: Faustino Benedict PT Service: -- Author Type: Physical Therapist    Filed: 7/16/2019  4:18 PM Encounter Date: 7/16/2019 Status: Addendum    : Faustino Benedict PT (Physical Therapist)    Related Notes: Original Note by Faustino Benedict PT (Physical Therapist) filed at 7/16/2019  4:15 PM        Prone Quad Stretch    Lie down flat on your stomach. Wrap a strap (belt, towel, dog leash) around the top of one of your feet and pull the strap across your opposite shoulder so that your knee starts to curl up to your body. Pull until a stretch is felt across the front of your thigh. '    Hold 15 seconds  2 times per side  2 times per day           HAMSTRING STRETCH - SUPINE    While lying on your back, raise up your leg and hold the back of your knee until a stretch is felt.    10 times per side  2 times per day      SIDELYING - STRETCH - ILIOTIBIAL BAND - ITB    Start by lying on your side with your back near the edge of your bed or table. Your affected leg should be on top.  Next, let the top leg lower behind you as you maintain an extended knee as shown. You should feel a gentle stretch along the side of your leg.    Hold 15 seconds  2 times per side  2 times per day                        STRAIGHT LEG RAISE - SLR    While lying or sitting, raise up your leg with a straight knee.  Keep the opposite knee bent with the foot planted to the ground.    Hold 3-5 seconds  10-20 times  1 time per day

## 2021-06-17 NOTE — PATIENT INSTRUCTIONS - HE
"Patient Instructions by Faustino Benedict PT at 7/23/2019 10:00 AM     Author: Faustino Benedict PT Service: -- Author Type: Physical Therapist    Filed: 7/23/2019 10:23 AM Encounter Date: 7/23/2019 Status: Addendum    : Faustino Benedict PT (Physical Therapist)    Related Notes: Original Note by Faustino Benedict PT (Physical Therapist) filed at 7/23/2019 10:21 AM        STANDING ILIOTIBIAL BAND STRETCH SUPPORTED - ITB    In a standing position, cross the affected leg behind your unaffected leg.     Next, lean forward and towards the unaffected side while using your arm for balance support.    Hold 15 seconds  2X/side  2X/day        SQUATS    Stand with feet shoulder width apart and arms extended in front of you (touching a table or back of a chair for balance if necessary).  Slowly squat down as tolerated, being sure to keep the back flat.      10-20X  1X/day       Mini Knee Lunge Emphasizing Quad Control    Make sure that motion of the knee is controlled in a \"straight forward/backward\" manner.  Do not let knee twist in or out when bending down.    1. Knee cap over the 2ndtoe  2. Maintain the arch of your foot (don't let it collapse)    X 10-20  1X/day            "

## 2021-06-27 NOTE — RESULT ENCOUNTER NOTE
"Dah-- can you send out the following letter:    \"Dear Saw Jagdish,    Based on your recent lab work, it looks like your hemoglobin (red blood cells) is low because of your underlying thalassemia which we already know about. Thalassemia is a genetic condition in which you normally make \"smaller\" hemoglobin. Your iron levels looked okay. We don't need to do anything for treatment, but it would be a good idea to periodically check hemoglobin. I think a visit in about 3 months time would be a good idea. Of course, it is always okay to come in sooner if needed.     Please reach out with any questions!      Sincerely,     Dr. Montgomery\""

## 2021-08-16 ENCOUNTER — OFFICE VISIT (OUTPATIENT)
Dept: FAMILY MEDICINE | Facility: CLINIC | Age: 66
End: 2021-08-16
Payer: COMMERCIAL

## 2021-08-16 VITALS
HEIGHT: 60 IN | DIASTOLIC BLOOD PRESSURE: 68 MMHG | HEART RATE: 53 BPM | SYSTOLIC BLOOD PRESSURE: 111 MMHG | TEMPERATURE: 98.2 F | RESPIRATION RATE: 20 BRPM | WEIGHT: 127.6 LBS | OXYGEN SATURATION: 98 % | BODY MASS INDEX: 25.05 KG/M2

## 2021-08-16 DIAGNOSIS — L29.9 ITCHING: ICD-10-CM
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
## 2021-08-16 PROCEDURE — 99213 OFFICE O/P EST LOW 20 MIN: CPT | Mod: GC | Performed by: STUDENT IN AN ORGANIZED HEALTH CARE EDUCATION/TRAINING PROGRAM

## 2021-08-16 RX ORDER — CETIRIZINE HYDROCHLORIDE 10 MG/1
20 TABLET ORAL DAILY
Qty: 30 TABLET | Refills: 1 | Status: SHIPPED | OUTPATIENT
Start: 2021-08-16

## 2021-08-16 RX ORDER — HYDROCORTISONE VALERATE CREAM 2 MG/G
CREAM TOPICAL 2 TIMES DAILY
Qty: 45 G | Refills: 1 | Status: SHIPPED | OUTPATIENT
Start: 2021-08-16 | End: 2021-08-16

## 2021-08-16 RX ORDER — HYDROCORTISONE VALERATE CREAM 2 MG/G
CREAM TOPICAL 2 TIMES DAILY
Qty: 45 G | Refills: 1 | Status: SHIPPED | OUTPATIENT
Start: 2021-08-16 | End: 2021-12-09

## 2021-08-16 RX ORDER — CALAMINE
LOTION (ML) TOPICAL
Qty: 180 ML | Refills: 1 | Status: SHIPPED | OUTPATIENT
Start: 2021-08-16

## 2021-08-16 ASSESSMENT — MIFFLIN-ST. JEOR: SCORE: 1197.56

## 2021-08-16 NOTE — PROGRESS NOTES
Preceptor Attestation:  I discussed the patient with the resident and evaluated the patient in person. I have verified the content of the note, which accurately reflects my assessment of the patient and the plan of care.  Supervising Physician:  Gregoria Frances MD.

## 2021-08-16 NOTE — PROGRESS NOTES
[facility] Visit    Assessment & Plan     1. Bedbug bite, initial encounter  2. Itching  Distribution and appearance of rash consistent with bedbugs.  He states that he does not know of any insect bites.  Recommend using increased dose of cetirizine along with calamine lotion to help break itching cycle.  In addition recommend that he wash all sheets and clothing he is worn in the past week.  Offered that if he is renting can have social work help with landlord coordination.  I did offer to biopsy one of the lesions today for confirm diagnosis, he would prefer to do medication therapy for 1 week and then see if his symptoms improve.  Additionally, if increased itching could try Benadryl to help with sleep or an increased dose of cetirizine up to 30 mg.  - Calamine external lotion; Apply to bilateral arms and legs three times daily  Dispense: 180 mL; Refill: 1  - cetirizine (ZYRTEC) 10 MG tablet; Take 2 tablets (20 mg) by mouth daily  Dispense: 30 tablet; Refill: 1  - hydrocortisone (WESTCORT) 0.2 % external cream; Apply topically 2 times daily Apply to bites on arms and legs bilaterally  Dispense: 45 g; Refill: 1    RTC in 1 week for follow up of rash if needed or sooner if develops new or worsening symptoms.    Options for treatment and follow-up care were reviewed with the patient who was engaged and actively involved in the decision making process, verbalized understanding of the options discussed, and satisfied with the final plan.    Patient was staffed with supervising physician, Dr. Juan Daniel Tenorio MD, PGY3  [facility]    Subjective   Saw Hsa Jagdish is a 66 year old male with a history including lichen simplex who presents for evaluation of the following:    Chief Complaints and History of Present Illnesses   Patient presents with     Derm Problem     rash all over the body x1 wk     Presents today to evaluate a 1 week history of rash.  He states about 1  week ago he noticed he went outside and when he started sweating he became very itchy.  He states that it is worse in his hands and feet but has gone up his arms and legs as well.  He tried some green liquid medication which did not help along with a home remedy.  He does have a history of lichen simplex chronicus but this feels different.  Denies any exposure to bedbugs or any other type of insect bite.  No new soaps or detergents.  No sick contacts.  He states that his Zyrtec is not helping his itching.  Had a steroid cream at home that he tried with little to no alleviation.  No other contacts that have had similar presentation.  No fevers or chills.  Has otherwise been well.    Patient Active Problem List    Diagnosis Date Noted     Beta thalassemia trait 05/28/2021     Priority: Medium     Lichen simplex chronicus 04/12/2021     Priority: Medium     Anterior bilateral shins       Neuropathy 04/08/2015     Priority: Medium     GERD (gastroesophageal reflux disease) 12/11/2013     Priority: Medium     Back pain 12/11/2013     Priority: Medium     Urinary frequency 12/11/2013     Priority: Medium       Current Outpatient Medications   Medication Instructions     aspirin (ASPIRIN LOW DOSE) 81 mg, Oral, DAILY     atorvastatin (LIPITOR) 20 mg, Oral, DAILY     Ivan Protect (EUCERIN) external cream Topical, 3 TIMES DAILY     cetirizine (ZYRTEC) 10 mg, Oral, DAILY PRN     Skin Protectants, Misc. (EUCERIN) cream Topical, 3 TIMES DAILY     triamcinolone (KENALOG) 0.1 % external cream Topical, 2 TIMES DAILY PRN       Social: He reports that he has never smoked. He has never used smokeless tobacco. He reports that he does not drink alcohol and does not use drugs.    Nursing Notes:   Claudette Bolanos CMA  8/16/2021  2:00 PM  Sign at exiting of workspace  Due to patient being non-English speaking/uses sign language, an  was used for this visit. Only for face-to-face interpretation by an external agency, date and length  "of interpretation can be found on the scanned worksheet.     name: Sung collado  Agency: [employer]  Language: Meaghan   Telephone number: [phone]  Type of interpretation: Telephone, spoken    Objective     Vitals:    08/16/21 1402   BP: 111/68   Pulse: 53   Resp: 20   Temp: 98.2  F (36.8  C)   TempSrc: Oral   SpO2: 98%   Weight: 57.9 kg (127 lb 9.6 oz)   Height: 1.51 m (4' 11.45\")     Body mass index is 25.38 kg/m .    GEN: NAD, healthy, alert  HEENT: NC/AT, EOMI, normal conjunctivae/sclerae, clear oropharynx, MMM  RESP: CTAB, no w/r/r  CV: RRR, nl S1/S2, no m/r/g, no peripheral edema  ABD: soft, NT/ND, +BS throughout  MSK: no MSK defects noted, gait is age appropriate w/o ataxia  SKIN: There are erythematous, excoriated, various stages of scabbed papule lesions scattered over the bilateral upper and lower extremities.  There is some surrounding erythema along with what appears to be a ink colored outline which patient endorses is a home remedy.  No weeping from any of the lesions.  No crusting.  NEURO: no obvious focal deficits  PSYCH: mentation appears normal, affect normal/bright      "

## 2021-08-16 NOTE — PATIENT INSTRUCTIONS
Take 20 mg of Zyrtec daily (2 pills). Call clinic if this does not help the itching and we can increase this further.    Use the creams on the arms and legs twice daily to help with the itching.    Wash all clothes and bedding. If not better next week we will consider a biopsy.      Patient Education     Bedbugs  After years of being very rare in the [place] bedbugs are making a comeback. These bugs are small, about the size of an apple seed. They are reddish-brown, oval, and look slightly flattened. Bedbugs feed on human and animal blood, usually at night during sleep. Bedbugs are a nuisance. But they are not a major threat to your health.   Facts about bedbugs    Bedbugs are active mainly at night. During the day, they hide in dark places, often in and around where people or animals sleep. They are commonly found on mattresses and boxsprings and behind headboards. But they can hide anywhere.    Bedbugs are small and hard to see. They are often carried from place to place in items like luggage, furniture, and clothing. This is why they spread so easily.    Bedbugs are not attracted to dirt. Even the cleanest house or hotel can have bedbugs.    Unlike mosquitoes, bedbugs don't spread disease. If you are bitten, you don't have to worry about catching a blood-borne illness.    Insect repellents have little effect on bedbugs.    Adult bedbugs can live for several months without a blood feeding.    Bedbugs are very hard to get rid of. If an infestation is suspected, it is recommended that a professional  be called.    Signs of bedbugs  Bites can be the first sign of a bedbug infestation. When inspecting for the bugs, look in crevices of mattresses and box springs, behind the headboard, and in and on objects near or under the bed. You may see the bugs themselves. Or, you may see tiny dark stains on fabric or carpets. Smears of blood on sheets and nightclothes upon awakening are another sign. In some cases,  the bugs are so well hidden they can t be found unless items are taken apart.   Bedbug bites    Bedbugs look for food at night. They bite while people or animals are sleeping. The bites are most often painless. Many people never know they ve been bitten. But some people develop an itchy red welt or swelling. And if a person has an allergic reaction, severe itching, blisters, or hives can develop. Bites are often on areas that are exposed, such as the head, neck, arms, and hands. Bedbug bites are not dangerous and don t spread illness. But if the bite is scratched and the skin is broken and irritated, there is a chance that a skin infection can develop.   Treating bites  Bite symptoms usually go away on their own within a week or two. During this time, over-the-counter (OTC) hydrocortisone ointment or cream can help relieve itching and swelling. If itching is bad, an OTC antihistamine that s taken by mouth (oral) can help. If an infection develops from scratching the bites, your healthcare provider can prescribe an antibiotic.   If you were bitten by bedbugs in your home, talk to a licensed pest-control professional or company. They can inspect your home and help you get rid of the bugs safely.   When to call your healthcare provider  If you have bites, call your healthcare provider or seek medical attention right away if you develop any of the following:     A fever of 100.4 F ( 38 C ) or higher, or as directed by your provider    Signs of infection of the bites, such as increased swelling and pain, warmth, or oozing    Signs of allergic reaction, such as hives, spreading rash, throat itching or swelling, or wheezing  Preventing bedbugs    Don't buy used beds. If you do buy used bed frames, mattresses, box springs, or other furniture, check them carefully for bedbugs before bringing them into your home.    If bedbugs are found or suspected in the bed, use mattress and box spring encasement covers that can seal in  bedbugs so they will die there.    When traveling, remove linens from the top of the bed and check the mattress and headboard for signs of the bugs. Place luggage on a hard surface such as a table or on a luggage rack and not on the floor.    If you think you were exposed to bedbugs while traveling, wash all clothing in hot water as soon as you get home. Washing alone will not kill the bugs. Clothing must be put in a dryer at high temperatures, at least 113 F (45 C) for 1 hour.     Never  items discarded on the street for use in your home. These include bed frames, mattresses, box springs, or upholstered furniture. These items may carry bedbugs.    [employer] last reviewed this educational content on 8/1/2019 2000-2021 [employer]. All rights reserved. This information is not intended as a substitute for professional medical care. Always follow your healthcare professional's instructions.

## 2021-08-16 NOTE — NURSING NOTE
Due to patient being non-English speaking/uses sign language, an  was used for this visit. Only for face-to-face interpretation by an external agency, date and length of interpretation can be found on the scanned worksheet.     name: Sung collado  Agency: [employer]  Language: Meaghan   Telephone number: [phone]  Type of interpretation: Telephone, spoken

## 2021-08-17 DIAGNOSIS — Z00.00 ROUTINE GENERAL MEDICAL EXAMINATION AT A HEALTH CARE FACILITY: ICD-10-CM

## 2021-08-17 DIAGNOSIS — Z12.11 SCREENING FOR COLON CANCER: ICD-10-CM

## 2021-08-26 ENCOUNTER — OFFICE VISIT (OUTPATIENT)
Dept: FAMILY MEDICINE | Facility: CLINIC | Age: 66
End: 2021-08-26
Payer: COMMERCIAL

## 2021-08-26 VITALS
OXYGEN SATURATION: 98 % | TEMPERATURE: 97.7 F | HEIGHT: 60 IN | DIASTOLIC BLOOD PRESSURE: 64 MMHG | BODY MASS INDEX: 25.32 KG/M2 | HEART RATE: 47 BPM | RESPIRATION RATE: 16 BRPM | WEIGHT: 129 LBS | SYSTOLIC BLOOD PRESSURE: 111 MMHG

## 2021-08-26 DIAGNOSIS — R23.8 EXCORIATED PAPULE: Primary | ICD-10-CM

## 2021-08-26 DIAGNOSIS — R21 RASH: ICD-10-CM

## 2021-08-26 PROCEDURE — 99213 OFFICE O/P EST LOW 20 MIN: CPT | Performed by: FAMILY MEDICINE

## 2021-08-26 RX ORDER — PERMETHRIN 50 MG/G
CREAM TOPICAL
Qty: 60 G | Refills: 1 | Status: SHIPPED | OUTPATIENT
Start: 2021-08-26 | End: 2023-11-17

## 2021-08-26 ASSESSMENT — MIFFLIN-ST. JEOR: SCORE: 1203.89

## 2021-08-26 NOTE — PATIENT INSTRUCTIONS
"Ongoing \"Itch bumps\" on skin. Especially when sweating. Started around wrists, and moved up arms. Now on arms and legs and some on chest.    1) Permthrin cream over night tonight. Repeat in one week  2) Recheck in TWO weeks if not better  3) Wash clothes in HOT water tomorrow am  "

## 2021-08-26 NOTE — PROGRESS NOTES
"    Assessment & Plan     Excoriated papule  Ongoing \"Itch bumps\" on skin. Especially when sweating. Started around wrists, and moved up arms. Now on arms and legs and some on chest.    1) Permthrin cream over night tonight. Repeat in one week  2) Recheck in TWO weeks if not better  3) Wash clothes in HOT water tomorrow am  - permethrin (ELIMITE) 5 % external cream; Apply cream from head to toe (except the face); leave on for 8-14 hours then wash off with water; reapply in 1 week if live mites appear.    Rash  Unclear etiology, but infectious causes like scabies or bed bugs possible    956}     BMI:   Estimated body mass index is 25.66 kg/m  as calculated from the following:    Height as of this encounter: 1.51 m (4' 11.45\").    Weight as of this encounter: 58.5 kg (129 lb).   Weight management plan: Discussed healthy diet and exercise guidelines        No follow-ups on file.    Riley Purdy MD  [facility]    Subjective   Saw is a 66 year old who presents for the following health issues     HPI           Review of Systems         Objective    /64 (BP Location: Left arm, Patient Position: Sitting, Cuff Size: Adult Regular)   Pulse (!) 47   Temp 97.7  F (36.5  C) (Oral)   Resp 16   Ht 1.51 m (4' 11.45\")   Wt 58.5 kg (129 lb)   SpO2 98%   BMI 25.66 kg/m    Body mass index is 25.66 kg/m .  Physical Exam                   "

## 2021-08-26 NOTE — NURSING NOTE
Due to patient being non-English speaking/uses sign language, an  was used for this visit. Only for face-to-face interpretation by an external agency, date and length of interpretation can be found on the scanned worksheet.     name: [id number]  Agency:  [employer]   Language: Meaghan   Telephone number:   Type of interpretation: Telephone, spoken

## 2021-12-09 ENCOUNTER — OFFICE VISIT (OUTPATIENT)
Dept: FAMILY MEDICINE | Facility: CLINIC | Age: 66
End: 2021-12-09
Payer: COMMERCIAL

## 2021-12-09 VITALS
SYSTOLIC BLOOD PRESSURE: 106 MMHG | TEMPERATURE: 98.2 F | RESPIRATION RATE: 16 BRPM | OXYGEN SATURATION: 98 % | WEIGHT: 130.2 LBS | DIASTOLIC BLOOD PRESSURE: 64 MMHG | HEART RATE: 55 BPM | BODY MASS INDEX: 25.9 KG/M2
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
## 2021-12-09 DIAGNOSIS — K21.9 GASTROESOPHAGEAL REFLUX DISEASE WITHOUT ESOPHAGITIS: Primary | ICD-10-CM

## 2021-12-09 DIAGNOSIS — L29.9 ITCHING: ICD-10-CM

## 2021-12-09 PROCEDURE — 99214 OFFICE O/P EST MOD 30 MIN: CPT | Mod: GC | Performed by: STUDENT IN AN ORGANIZED HEALTH CARE EDUCATION/TRAINING PROGRAM

## 2021-12-09 RX ORDER — CALCIUM CARBONATE 500 MG/1
1 TABLET, CHEWABLE ORAL 3 TIMES DAILY PRN
Qty: 90 TABLET | Refills: 3 | Status: SHIPPED | OUTPATIENT
Start: 2021-12-09

## 2021-12-09 RX ORDER — FAMOTIDINE 20 MG/1
20 TABLET, FILM COATED ORAL 2 TIMES DAILY
Qty: 60 TABLET | Refills: 3 | Status: SHIPPED | OUTPATIENT
Start: 2021-12-09 | End: 2023-09-20 | Stop reason: ALTCHOICE

## 2021-12-09 RX ORDER — HYDROCORTISONE VALERATE CREAM 2 MG/G
CREAM TOPICAL 2 TIMES DAILY
Qty: 45 G | Refills: 1 | Status: SHIPPED | OUTPATIENT
Start: 2021-12-09 | End: 2023-11-17

## 2021-12-09 NOTE — PROGRESS NOTES
Preceptor Attestation:   Patient seen, evaluated and discussed with the resident. I have verified the content of the note, which accurately reflects my assessment of the patient and the plan of care.   Supervising Physician:  Angel Garcia MD

## 2021-12-09 NOTE — PATIENT INSTRUCTIONS
Take the Pepcid 20 mg twice daily every day.    Use the PENNY chewables with spicy food or when eating.    Keep notes of what makes your pain better and worse. Please let me know what your triggers are.    We will talk about lab work at the next visit.

## 2021-12-13 NOTE — PROGRESS NOTES
[facility] Medicine Clinic Visit    Assessment & Plan     1. Gastroesophageal reflux disease without esophagitis  Unclear etiology, it would be unusual that GERD would cause pain to radiate down his legs, however he does think this is attributed to his stomach and has experienced GERD in the past.  We will treat for 2 to 4 weeks and reevaluate.  I also offered him H. pylori testing but he would like to try the medicine first.  Can consider that in the future.  May also consider doing imaging of the back versus PHQ-9 VERENA-7.  - famotidine (PEPCID) 20 MG tablet; Take 1 tablet (20 mg) by mouth 2 times daily  Dispense: 60 tablet; Refill: 3  - calcium carbonate (TUMS) 500 MG chewable tablet; Take 1 tablet (500 mg) by mouth 3 times daily as needed for heartburn  Dispense: 90 tablet; Refill: 3    2. Bedbug bite, initial encounter  - hydrocortisone (WESTCORT) 0.2 % external cream; Apply topically 2 times daily Apply to bites on arms and legs bilaterally  Dispense: 45 g; Refill: 1    3. Itching  - hydrocortisone (WESTCORT) 0.2 % external cream; Apply topically 2 times daily Apply to bites on arms and legs bilaterally  Dispense: 45 g; Refill: 1    Options for treatment and follow-up care were reviewed with the patient who was engaged and actively involved in the decision making process, verbalized understanding of the options discussed, and satisfied with the final plan.    Patient was staffed with supervising physician, Dr. Garcia, who agrees with the assessment and plan.    Damien Tenorio MD, PGY3  [facility]    Patient Instructions   Take the Pepcid 20 mg twice daily every day.    Use the PENNY chewables with spicy food or when eating.    Keep notes of what makes your pain better and worse. Please let me know what your triggers are.    We will talk about lab work at the next visit.            Subjective   Saw Sung Jagdish is a 66 year old male with a history including GERD, beta thalassemia who  presents today for evaluation of back pain.    Chief Complaints and History of Present Illnesses   Patient presents with     Back Pain     per pt states that his back has been hurting and burning cessation x 2 mos now, no injuries- states that the pain has been worse this month     Musculoskeletal Problem     burning cessation in both legs     Presents today for 2 to 4-week history of back pain.  He has the pain starts in his stomach and radiates to his back.  He states that this pain is burning in nature, it is constant.  He notices it most at night.  It starts in the middle of his back behind the shoulder blades and radiates down his arms and legs.  He does not have any history of injury to the back.  States that he thinks this may be due to his stomach because it is worse after he eats space.  He has not had any hematemesis or blood in his stool.  He has a history of H. pylori, unclear if this was treated or not per chart review.  He used to be on a PPI and H2 blocker but is no longer taking any medicine for his stomach.  He has not tried anything at home to help with this.  He does not have more bladder incontinence.  Positions do not seem to make a difference regarding his pain.    Also here to follow-up on his skin, I did both treatment for scabies and bedbugs and he only has 1 bug bite left that is itchy.  This is on his forearm.    Denies any difficulty with mood.  No fevers or chills.  Skin changes as above.  No cough, chest pain or shortness of breath.  Abdominal pain as above.    Patient Active Problem List    Diagnosis Date Noted     Beta thalassemia trait 05/28/2021     Priority: Medium     Lichen simplex chronicus 04/12/2021     Priority: Medium     Anterior bilateral shins       Neuropathy 04/08/2015     Priority: Medium     GERD (gastroesophageal reflux disease) 12/11/2013     Priority: Medium     Back pain 12/11/2013     Priority: Medium     Urinary frequency 12/11/2013     Priority: Medium        Social: He reports that he has never smoked. He has never used smokeless tobacco. He reports that he does not drink alcohol and does not use drugs.    There are no exam notes on file for this visit.    Objective     Vitals:    12/09/21 1447   BP: 106/64   Pulse: 55   Resp: 16   Temp: 98.2  F (36.8  C)   TempSrc: Oral   SpO2: 98%   Weight: 59.1 kg (130 lb 3.2 oz)     Body mass index is 25.9 kg/m .    GEN: NAD, healthy, alert  HEENT: NC/AT, EOMI, normal conjunctivae/sclerae, clear oropharynx, MMM  RESP: CTAB, no w/r/r  CV: RRR, nl S1/S2, no m/r/g, no peripheral edema  ABD: soft, NT/ND, +BS throughout  MSK: no MSK defects noted, gait is age appropriate w/o ataxia.  No tenderness to palpation over the spinous processes in the cervical, sick, lumbar sacral region.  Straight leg test negative bilaterally.  VIDYA test negative bilaterally.  SKIN: no suspicious lesions or rashes  NEURO: no obvious focal deficits  PSYCH: mentation appears normal, affect normal/bright

## 2021-12-23 ENCOUNTER — OFFICE VISIT (OUTPATIENT)
Dept: FAMILY MEDICINE | Facility: CLINIC | Age: 66
End: 2021-12-23
Payer: COMMERCIAL

## 2021-12-23 VITALS
WEIGHT: 131.4 LBS | TEMPERATURE: 98 F | SYSTOLIC BLOOD PRESSURE: 106 MMHG | HEART RATE: 52 BPM | BODY MASS INDEX: 26.14 KG/M2 | RESPIRATION RATE: 16 BRPM | OXYGEN SATURATION: 95 % | DIASTOLIC BLOOD PRESSURE: 59 MMHG

## 2021-12-23 DIAGNOSIS — M54.50 CHRONIC BILATERAL LOW BACK PAIN WITHOUT SCIATICA: ICD-10-CM

## 2021-12-23 DIAGNOSIS — G89.29 CHRONIC BILATERAL LOW BACK PAIN WITHOUT SCIATICA: ICD-10-CM

## 2021-12-23 DIAGNOSIS — M48.061 SPINAL STENOSIS, LUMBAR REGION, WITHOUT NEUROGENIC CLAUDICATION: Primary | ICD-10-CM

## 2021-12-23 PROCEDURE — 99214 OFFICE O/P EST MOD 30 MIN: CPT | Mod: 25 | Performed by: STUDENT IN AN ORGANIZED HEALTH CARE EDUCATION/TRAINING PROGRAM

## 2021-12-23 PROCEDURE — T1013 SIGN LANG/ORAL INTERPRETER: HCPCS | Performed by: STUDENT IN AN ORGANIZED HEALTH CARE EDUCATION/TRAINING PROGRAM

## 2021-12-23 RX ORDER — CAPSAICIN 0.025 %
1 CREAM (GRAM) TOPICAL 3 TIMES DAILY PRN
Qty: 120 G | Refills: 1 | Status: SHIPPED | OUTPATIENT
Start: 2021-12-23

## 2021-12-23 RX ORDER — GABAPENTIN 300 MG/1
300 CAPSULE ORAL AT BEDTIME
Qty: 60 CAPSULE | Refills: 1 | Status: SHIPPED | OUTPATIENT
Start: 2021-12-23 | End: 2022-01-20

## 2021-12-23 RX ORDER — ACETAMINOPHEN 325 MG/1
325-650 TABLET ORAL EVERY 6 HOURS PRN
Qty: 100 TABLET | Refills: 1 | Status: SHIPPED | OUTPATIENT
Start: 2021-12-23

## 2021-12-23 NOTE — NURSING NOTE
Due to patient being non-English speaking/uses sign language, an  was used for this visit. Only for face-to-face interpretation by an external agency, date and length of interpretation can be found on the scanned worksheet.     name: Sung Brown  Agency: [employer]  Language: Meaghan   Telephone number: [phone]  Type of interpretation: Face-to-face, spoken

## 2021-12-23 NOTE — PROGRESS NOTES
[facility] Medicine Clinic Visit    Assessment & Plan     1. Spinal stenosis, lumbar region, without neurogenic claudication  2. Chronic bilateral low back pain without sciatica    Suspect his pain is secondary to lumbar stenosis as he notices the pain at night, describes it as burning and is worse with sitting and prolonged standing and is most prominent over his spinous processes.  Per chart review, does have a history of chronic back pain has tried different medicines in the past including gabapentin.  He has not been on any of these meds for several years.  Will reinitiate gabapentin at night, as this is when he notices the burning the most.  I also offered PT referral and x-ray versus MRI today but he would like to try the medicine and monitor response which I think is reasonable.    - gabapentin (NEURONTIN) 300 MG capsule; Take 1 capsule (300 mg) by mouth At Bedtime  Dispense: 60 capsule; Refill: 1  - acetaminophen (TYLENOL) 325 MG tablet; Take 1-2 tablets (325-650 mg) by mouth every 6 hours as needed for mild pain  Dispense: 100 tablet; Refill: 1  - capsaicin (ZOSTRIX) 0.025 % external cream; Apply 1 g topically 3 times daily as needed (back pain)  Dispense: 120 g; Refill: 1      Options for treatment and follow-up care were reviewed with the patient who was engaged and actively involved in the decision making process, verbalized understanding of the options discussed, and satisfied with the final plan.    Patient was staffed with supervising physician, Dr. Garcia, who agrees with the assessment and plan.    Damien Tenorio MD, PGY3  [facility]    Patient Instructions   Plan for today:      Take the gabapentin 300 mg at bedtime to help with burning pain      Take tylenol 2 tablets 3 times a day as needed for breakthrough pain      Use the cream to massage in the back when you notice burning      Consider PT referral to learn exercises and we will also consider MRI    Thank  you for trusting us with your care at [facility]!        Subjective   Saw Sung Dubon is a 66 year old male who returns to clinic for follow up    Chief Complaints and History of Present Illnesses   Patient presents with     Back Pain     Heartburn:  Was seen 2 weeks ago for evaluation of burning abdominal pain that radiated to the back.  He has a history of H. pylori, unclear if this was treated but has been on PPI in the past.  We initiated an H2 blocker at this visit.  He has been taking this medication twice daily and reports improvement of his symptoms.  He is able to tolerate a normal diet without discomfort.  No nausea or vomiting.  No bloating, increased gas or constipation.  Is very satisfied with this treatment.    Back pain:  Would like to discuss an 8-year history of back pain.  He does not remember if he had a specific injury but notes that he has had a burning sensation in his back for the past 8 years.  He has taken several different medicines, he does not remember what he is taking in the past.  He does use Tylenol currently when the pain gets severe, this takes away the aching pain but does not take away the burning sensation he feels.  He states that it is in the middle of his back and radiates down both his legs.  Ice does help with the burning.  He notices the pain more at nighttime and when sitting in the car for long periods of time.  He is done PT in the past, this is not an for his back but for shoulder and knee.  No loss of bowel or bladder function.  He is unsure if he has had imaging before.    Patient Active Problem List    Diagnosis Date Noted     Beta thalassemia trait 05/28/2021     Priority: Medium     Lichen simplex chronicus 04/12/2021     Priority: Medium     Anterior bilateral shins       Neuropathy 04/08/2015     Priority: Medium     GERD (gastroesophageal reflux disease) 12/11/2013     Priority: Medium     Back pain 12/11/2013     Priority: Medium     Urinary frequency  12/11/2013     Priority: Medium       Current Outpatient Medications   Medication Instructions     aspirin (ASPIRIN LOW DOSE) 81 mg, Oral, DAILY     atorvastatin (LIPITOR) 20 mg, Oral, DAILY     Ivan Protect (EUCERIN) external cream Topical, 3 TIMES DAILY     Calamine external lotion Apply to bilateral arms and legs three times daily     calcium carbonate (TUMS) 500 mg, Oral, 3 TIMES DAILY PRN     cetirizine (ZYRTEC) 20 mg, Oral, DAILY     famotidine (PEPCID) 20 mg, Oral, 2 TIMES DAILY     hydrocortisone (WESTCORT) 0.2 % external cream Topical, 2 TIMES DAILY, Apply to bites on arms and legs bilaterally     permethrin (ELIMITE) 5 % external cream Apply cream from head to toe (except the face); leave on for 8-14 hours then wash off with water; reapply in 1 week if live mites appear.     Skin Protectants, Misc. (EUCERIN) cream Topical, 3 TIMES DAILY     Social: He reports that he has never smoked. He has never used smokeless tobacco. He reports that he does not drink alcohol and does not use drugs.    Nursing Notes:   Malcolm Bolanos CMA  12/23/2021  1:08 PM  Signed    Malcolm Bolanos CMA  12/23/2021  1:15 PM  Signed  Due to patient being non-English speaking/uses sign language, an  was used for this visit. Only for face-to-face interpretation by an external agency, date and length of interpretation can be found on the scanned worksheet.     name: [facility]  Agency: [employer]  Language: Meaghan   Telephone number: [phone]  Type of interpretation: Face-to-face, spoken    Objective     Vitals:    12/23/21 1307   BP: 106/59   Pulse: 52   Resp: 16   Temp: 98  F (36.7  C)   TempSrc: Oral   SpO2: 95%   Weight: 59.6 kg (131 lb 6.4 oz)     Body mass index is 26.14 kg/m .    GEN: NAD, healthy, alert  HEENT: NC/AT, EOMI, normal conjunctivae/sclerae, clear oropharynx, MMM  RESP: CTAB, no w/r/r  CV: RRR, nl S1/S2, no m/r/g, no peripheral edema  ABD: soft, NT/ND, +BS throughout  MSK: no MSK defects noted, gait is  age appropriate w/o ataxia  BACK: He notes pain from L3 down to the sacrum, no bony process tenderness noted on exam.  Muscle tension noted on bilateral paraspinous muscles without any pain with diffuse palpation.  Straight leg negative bilaterally.  SKIN: no suspicious lesions or rashes  NEURO: no obvious focal deficits  PSYCH: mentation appears normal, affect normal/bright

## 2021-12-23 NOTE — PATIENT INSTRUCTIONS
Plan for today:      Take the gabapentin 300 mg at bedtime to help with burning pain      Take tylenol 2 tablets 3 times a day as needed for breakthrough pain      Use the cream to massage in the back when you notice burning      Consider PT referral to learn exercises and we will also consider MRI    Thank you for trusting us with your care at [facility]!   yes

## 2022-01-20 ENCOUNTER — ANCILLARY PROCEDURE (OUTPATIENT)
Dept: GENERAL RADIOLOGY | Facility: CLINIC | Age: 67
End: 2022-01-20
Attending: STUDENT IN AN ORGANIZED HEALTH CARE EDUCATION/TRAINING PROGRAM
Payer: COMMERCIAL

## 2022-01-20 ENCOUNTER — OFFICE VISIT (OUTPATIENT)
Dept: FAMILY MEDICINE | Facility: CLINIC | Age: 67
End: 2022-01-20
Payer: COMMERCIAL

## 2022-01-20 VITALS
SYSTOLIC BLOOD PRESSURE: 117 MMHG | RESPIRATION RATE: 18 BRPM | BODY MASS INDEX: 25.72 KG/M2 | WEIGHT: 131 LBS | HEIGHT: 60 IN | TEMPERATURE: 98.8 F | DIASTOLIC BLOOD PRESSURE: 74 MMHG | HEART RATE: 56 BPM | OXYGEN SATURATION: 95 %

## 2022-01-20 DIAGNOSIS — G89.29 CHRONIC BILATERAL LOW BACK PAIN WITHOUT SCIATICA: ICD-10-CM

## 2022-01-20 DIAGNOSIS — M54.41 CHRONIC BILATERAL LOW BACK PAIN WITH BILATERAL SCIATICA: Primary | ICD-10-CM

## 2022-01-20 DIAGNOSIS — Z23 HIGH PRIORITY FOR 2019-NCOV VACCINE: ICD-10-CM

## 2022-01-20 DIAGNOSIS — G62.9 PERIPHERAL POLYNEUROPATHY: ICD-10-CM

## 2022-01-20 DIAGNOSIS — G89.29 CHRONIC BILATERAL LOW BACK PAIN WITH BILATERAL SCIATICA: Primary | ICD-10-CM

## 2022-01-20 DIAGNOSIS — M54.50 CHRONIC BILATERAL LOW BACK PAIN WITHOUT SCIATICA: ICD-10-CM

## 2022-01-20 DIAGNOSIS — M54.42 CHRONIC BILATERAL LOW BACK PAIN WITH BILATERAL SCIATICA: Primary | ICD-10-CM

## 2022-01-20 LAB
FOLATE SERPL-MCNC: 12.7 NG/ML
VIT B12 SERPL-MCNC: 1059 PG/ML (ref 213–816)

## 2022-01-20 PROCEDURE — 36415 COLL VENOUS BLD VENIPUNCTURE: CPT | Performed by: STUDENT IN AN ORGANIZED HEALTH CARE EDUCATION/TRAINING PROGRAM

## 2022-01-20 PROCEDURE — 82607 VITAMIN B-12: CPT | Performed by: STUDENT IN AN ORGANIZED HEALTH CARE EDUCATION/TRAINING PROGRAM

## 2022-01-20 PROCEDURE — 0064A COVID-19,PF,MODERNA (18+ YRS BOOSTER .25ML): CPT | Performed by: STUDENT IN AN ORGANIZED HEALTH CARE EDUCATION/TRAINING PROGRAM

## 2022-01-20 PROCEDURE — 72100 X-RAY EXAM L-S SPINE 2/3 VWS: CPT | Mod: FY | Performed by: RADIOLOGY

## 2022-01-20 PROCEDURE — 91306 COVID-19,PF,MODERNA (18+ YRS BOOSTER .25ML): CPT | Performed by: STUDENT IN AN ORGANIZED HEALTH CARE EDUCATION/TRAINING PROGRAM

## 2022-01-20 PROCEDURE — 82746 ASSAY OF FOLIC ACID SERUM: CPT | Performed by: STUDENT IN AN ORGANIZED HEALTH CARE EDUCATION/TRAINING PROGRAM

## 2022-01-20 PROCEDURE — 99213 OFFICE O/P EST LOW 20 MIN: CPT | Mod: 25 | Performed by: STUDENT IN AN ORGANIZED HEALTH CARE EDUCATION/TRAINING PROGRAM

## 2022-01-20 RX ORDER — GABAPENTIN 300 MG/1
300 CAPSULE ORAL 2 TIMES DAILY
Qty: 60 CAPSULE | Refills: 1 | Status: SHIPPED | OUTPATIENT
Start: 2022-01-20 | End: 2023-11-17

## 2022-01-20 ASSESSMENT — MIFFLIN-ST. JEOR: SCORE: 1208.77

## 2022-01-20 NOTE — NURSING NOTE
Due to patient being non-English speaking/uses sign language, an  was used for this visit. Only for face-to-face interpretation by an external agency, date and length of interpretation can be found on the scanned worksheet.     name: Sung Ward          ID:  Agency: [facility] [employer]  Language: Meaghan   Telephone number: [phone]  Type of interpretation: Telephone, spoken

## 2022-01-20 NOTE — PROGRESS NOTES
Assessment & Plan     Chronic bilateral low back pain with sciatica  Patient has longstanding chronic back pain with symptoms of peripheral lower extremity burning especially at night and with prolonged sitting.  Patient is agreeable to work with physical therapy for his symptoms, will try increasing gabapentin to twice a day dosing and add Voltaren.  X-ray of the lumbar spine showed normal vertebral height, normal intervertebral disc space, minimal osteophyte formation.  Agreeable to rule out other medical causes of peripheral neuropathy, if symptoms persist would be reasonable to follow-up with a Lumbar Spine MRI in the future.  - XR Lumbar Spine 2/3 Views; Future  - Physical Therapy Referral; Future  - diclofenac (VOLTAREN) 1 % topical gel; Apply 4 g topically 4 times daily  - gabapentin (NEURONTIN) 300 MG capsule; Take 1 capsule (300 mg) by mouth 2 times daily    Peripheral polyneuropathy  Patient has symptoms suggesting peripheral neuropathy, has had basic labs obtained earlier this year including an A1c 5.1%, ferritin 105 we will add a B12 level and folate for other causes of neuropathy.  - Vitamin B12  - Folate    High priority for 2019-nCoV vaccine  Patient agreeable to COVID booster, had 1 dose of Jona & Jona in March.  Immunization records appear to be inaccurate, patient states that he never received booster shots likely a mixup on [facility].  - COVID-19,PF,MODERNA (18+ Yrs BOOSTER .25mL)      Return if symptoms worsen or fail to improve.    Kareem Sykes MD  [facility]    Subjective   Saw is a 67 year old who presents for the following health issues    HPI     To saw is a 67-year-old following up for chronic back pain over the lumbar spine, he states that he has had on and off back pain for several months to years.  His symptoms appear to be worse at night when he fell asleep, also with prolonged periods of sitting and states that his lumbar back pain often radiates down  "into his legs and feet where he feels a burning sensation, states that his legs feel hot and is unable to sleep with covers on.  He denies any numbness, weakness, tingling sensation, loss of bladder, loss of bowel control, saddle anesthesia.  He denies any history of mechanical trauma to his back.  He denies any recent illnesses, no unintentional weight loss.  He was seen 1 month ago where he was prescribed gabapentin 300 mg nightly, Tylenol as needed, capsaicin cream topical for his symptoms.  He states that his medications have been minimally effective during this time.  He has not met with physical therapy for his back pain.  Has not had any imaging obtained of his back.      Review of Systems   Constitutional, HEENT, cardiovascular, pulmonary, gi and gu systems are negative, except as otherwise noted.    Objective    /74 (BP Location: Left arm, Patient Position: Sitting, Cuff Size: Adult Regular)   Pulse 56   Temp 98.8  F (37.1  C) (Tympanic)   Resp 18   Ht 1.511 m (4' 11.5\")   Wt 59.4 kg (131 lb)   SpO2 95%   BMI 26.02 kg/m    Body mass index is 26.02 kg/m .  Physical Exam   GENERAL: healthy, alert and no distress  EYES: Eyes grossly normal to inspection, PERRL and conjunctivae and sclerae normal  MS: no gross musculoskeletal defects noted, no edema  SKIN: no suspicious lesions or rashes  NEURO: Normal strength and tone, mentation intact and speech normal  PSYCH: mentation appears normal, affect normal/bright  BACK:   ROM: flexion -full /extension -full /lateral rotation- full /side bend- full   Bony tenderness: None   Paraspinal tenderness: None.   Sensation: intact in b/l lower extremities.   Strength: 5/5 w/ dorsiflexion/ plantarflexion/ inversion/ eversion/ knee flexion/ extension.   Maneuvers: Neg straight leg raise b/l. Neg Slump b/l Neg VIDYA   Neuro: DTR's 2+. Babinski's downgoing. Neg Clonus             "

## 2022-01-20 NOTE — PROGRESS NOTES
Preceptor Attestation:    I discussed the patient with the resident and evaluated the patient in person. I personally reviewed the imaging and agree with the interpretation documented by the resident. I have verified the content of the note, which accurately reflects my assessment of the patient and the plan of care.   Supervising Physician:  Angel Garcia MD.

## 2022-02-15 ENCOUNTER — HOSPITAL ENCOUNTER (OUTPATIENT)
Dept: PHYSICAL THERAPY | Facility: REHABILITATION | Age: 67
End: 2022-02-15
Attending: STUDENT IN AN ORGANIZED HEALTH CARE EDUCATION/TRAINING PROGRAM
Payer: COMMERCIAL

## 2022-02-15 DIAGNOSIS — M25.60 JOINT STIFFNESS OF SPINE: ICD-10-CM

## 2022-02-15 DIAGNOSIS — M54.41 CHRONIC BILATERAL LOW BACK PAIN WITH BILATERAL SCIATICA: Primary | ICD-10-CM

## 2022-02-15 DIAGNOSIS — M54.42 CHRONIC BILATERAL LOW BACK PAIN WITH BILATERAL SCIATICA: Primary | ICD-10-CM

## 2022-02-15 DIAGNOSIS — G89.29 CHRONIC BILATERAL LOW BACK PAIN WITH BILATERAL SCIATICA: Primary | ICD-10-CM

## 2022-02-15 PROCEDURE — 97161 PT EVAL LOW COMPLEX 20 MIN: CPT | Mod: GP | Performed by: PHYSICAL THERAPIST

## 2022-02-15 PROCEDURE — 97140 MANUAL THERAPY 1/> REGIONS: CPT | Mod: GP | Performed by: PHYSICAL THERAPIST

## 2022-02-15 PROCEDURE — 97110 THERAPEUTIC EXERCISES: CPT | Mod: GP | Performed by: PHYSICAL THERAPIST

## 2022-02-15 NOTE — PROGRESS NOTES
[facility]    OUTPATIENT PHYSICAL THERAPY ORTHOPEDIC EVALUATION  PLAN OF TREATMENT FOR OUTPATIENT REHABILITATION  (COMPLETE FOR INITIAL CLAIMS ONLY)  Patient's Last Name, First Name, M.I.  YOB: 1955  Jagdish,Saw  Hsa    Provider s Name:  [facility]   Medical Record No.  [id number]   Start of Care Date:  02/15/22   Onset Date:  01/01/22   Type:     _X__PT   ___OT   ___SLP Medical Diagnosis:  M54.42, M54.41, G89.29 (ICD-10-CM) - Chronic bilateral low back pain with bilateral sciatica     PT Diagnosis:  Acute on chronic bilateral lower back pain secondary to likely to discogenic pathology   Visits from [facility]:  1      _________________________________________________________________________________  Plan of Treatment/Functional Goals:  joint mobilization,manual therapy,neuromuscular re-education,ROM,strengthening,stretching           Goals  Goal Identifier: Self-management/HEP  Goal Description: Patient will be independent in self-management of condition and HEP.  Target Date: 04/26/22    Goal Identifier: Sleeping  Goal Description: Patient will be able to sleep throughout the night without waking d/t pain for improved QOL.  Target Date: 04/26/22    Goal Identifier: Sitting  Goal Description: Patient will be able to sit in the car or on a chair for 60 minutes for community mobility.  Target Date: 04/26/22    Goal Identifier: Walking  Goal Description: Patient will be able to walk 30 minutes with <5/10 pain in the lower back for functional mobility.  Target Date: 04/26/22       Therapy Frequency:  1 time/week  Predicted Duration of Therapy Intervention:  6-12 weeks    Alice Lucia, PT, DPT, MHA                 I CERTIFY THE NEED FOR THESE SERVICES FURNISHED UNDER        THIS PLAN OF TREATMENT AND WHILE UNDER MY CARE     (Physician co-signature of this document indicates  review and certification of the therapy plan).                       Certification Date From:  02/15/22   Certification Date To:  05/15/22    Referring Provider:  Kareem Sykes MD    Initial Assessment        See Epic Evaluation Start of Care Date: 02/15/22                02/15/22 1400   General Information   Type of Visit Initial OP Ortho PT Evaluation   Start of Care Date 02/15/22   Referring Physician Kareem Sykes MD   Patient/Family Goals Statement Burning back and massage   Orders Evaluate and Treat   Date of Order 01/20/22   Certification Required? Yes   Medical Diagnosis M54.42, M54.41, G89.29 (ICD-10-CM) - Chronic bilateral low back pain with bilateral sciatica   Surgical/Medical history reviewed Yes   Precautions/Limitations no known precautions/limitations       Present Yes   Language Other  ([place])   Body Part(s)   Body Part(s) Lumbar Spine/SI   Presentation and Etiology   Pertinent history of current problem (include personal factors and/or comorbidities that impact the POC) The patient reports that he has been dealing with lower back pain for 2-3 years.  Pain seems to be getting worse and has been for the last month.  He was carrying something heavy and it seems to have made his pain worse.  He has a disabled child that he needs to carry around who is 27 and weighs 80-90#.   Impairments A. Pain;D. Decreased ROM;E. Decreased flexibility;F. Decreased strength and endurance   Functional Limitations perform activities of daily living;perform desired leisure / sports activities   Symptom Location Bilateral lower back and into both legs   How/Where did it occur While lifting   Onset date of current episode/exacerbation 01/01/22   Chronicity Chronic   Pain rating (0-10 point scale) Best (/10);Worst (/10)   Best (/10) 6   Worst (/10) 9   Pain quality D. Burning   Frequency of pain/symptoms A. Constant   Pain/symptoms are: Worse during the night   Pain/symptoms exacerbated by A.  Sitting;B. Walking;C. Lifting;D. Carrying;K. Home tasks   Pain/symptoms eased by K. 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 No   Have you fallen and had an injury in the past year? No   Is patient a fall risk? No   Abuse Screen (yes response referral indicated)   Feels Unsafe at Home or Work/School no   Feels Threatened by Someone no   Does Anyone Try to Keep You From Having Contact with Others or Doing Things Outside Your Home? no   Physical Signs of Abuse Present no   Lumbar Spine/SI Objective Findings   Posture Min increased lumbar lordosis, rounded shoulders   Flexion ROM To mid shin with pain in the lower back   Extension ROM Pain and tightness in the lower back; mod limited   Right Side Bending ROM Pain to buttock/thigh with min limited ROM   Left Side Bending ROM Pain to buttock/thigh with min limited ROM   Lumbar ROM Comment Min limited rotation ROM bilaterally without pain   Pelvic Screen WNL   Hip Screen WNL no pain   Hip Flexion (L2) Strength 4+   Hip Abduction Strength 4   Hip Adduction Strength 4   Knee Flexion Strength 4   Knee Extension (L3) Strength 4+   Ankle Dorsiflexion (L4) Strength 4+   Great Toe Extension (L5) Strength 5   Slump Test Tightness bilaterally   Segmental Mobility Hypomobility throughout lower lumbar spine L3-5 without pain   Palpation Increased muscle tone in bilateral lumbar paraspinal musculature with no tenderness to palpation   Planned Therapy Interventions   Planned Therapy Interventions joint mobilization;manual therapy;neuromuscular re-education;ROM;strengthening;stretching   Clinical Impression   Criteria for Skilled  Therapeutic Interventions Met yes, treatment indicated   PT Diagnosis Acute on chronic bilateral lower back pain secondary to likely to discogenic pathology   Influenced by the following impairments ROM, strength, pain, mobility   Functional limitations due to impairments Sitting, walking, sleeping, bending, lifting, carrying   Clinical Presentation Stable/Uncomplicated   Clinical Decision Making (Complexity) Low complexity   Therapy Frequency 1 time/week   Predicted Duration of Therapy Intervention (days/wks) 6-12 weeks   Risk & Benefits of therapy have been explained Yes   Patient, Family & other staff in agreement with plan of care Yes   Clinical Impression Comments Saw Jagdish is a 67 year old male who presents to PT with acute on chronic lower back pain.  Initial onset of lower back pain was 2-3 years ago with a recent exacerbation of symptoms a month ago after carrying his child.  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  He has PMH positive for GERD and neuropathy.  His pain symptoms are constant and described as burning in nature from his lower back into his thighs and is rated 6-9/10.  He is having difficulty with sleeping at night, sitting for long periods, walking, lifting, bending, and carrying heavy objects.  On exam, patient has impaired lumbar ROM, impaired LE strength, increased mm tone in bilateral lumbar paraspinal muscles, and lumbar hypomobility.  His symptoms are most consistent with lumbar discogenic pain, but likely has musculoskeletal pain from frequent lifting and straining in his lower back.  He is appropriate for skilled 1:1 PT services to address the listed impairments and return to function.   ORTHO GOALS   PT Ortho Eval Goals 1;2;3;4   Ortho Goal 1   Goal Identifier Self-management/HEP   Goal Description Patient will be independent in self-management of condition and HEP.   Target Date 04/26/22   Ortho Goal 2   Goal Identifier Sleeping    Goal Description Patient will be able to sleep throughout the night without waking d/t pain for improved QOL.   Target Date 04/26/22   Ortho Goal 3   Goal Identifier Sitting   Goal Description Patient will be able to sit in the car or on a chair for 60 minutes for community mobility.   Target Date 04/26/22   Ortho Goal 4   Goal Identifier Walking   Goal Description Patient will be able to walk 30 minutes with <5/10 pain in the lower back for functional mobility.   Target Date 04/26/22   Total Evaluation Time   PT Eval, Low Complexity Minutes (30261) 28   Therapy Certification   Certification date from 02/15/22   Certification date to 05/15/22   Medical Diagnosis M54.42, M54.41, G89.29 (ICD-10-CM) - Chronic bilateral low back pain with bilateral sciatica     Alice Lucia, PT, DPT, A  2/15/2022

## 2022-03-08 ENCOUNTER — HOSPITAL ENCOUNTER (OUTPATIENT)
Dept: PHYSICAL THERAPY | Facility: REHABILITATION | Age: 67
End: 2022-03-08
Payer: COMMERCIAL

## 2022-03-08 DIAGNOSIS — M54.42 CHRONIC BILATERAL LOW BACK PAIN WITH BILATERAL SCIATICA: Primary | ICD-10-CM

## 2022-03-08 DIAGNOSIS — G89.29 CHRONIC BILATERAL LOW BACK PAIN WITH BILATERAL SCIATICA: Primary | ICD-10-CM

## 2022-03-08 DIAGNOSIS — M54.41 CHRONIC BILATERAL LOW BACK PAIN WITH BILATERAL SCIATICA: Primary | ICD-10-CM

## 2022-03-08 DIAGNOSIS — M25.60 JOINT STIFFNESS OF SPINE: ICD-10-CM

## 2022-03-08 PROCEDURE — 97140 MANUAL THERAPY 1/> REGIONS: CPT | Mod: GP | Performed by: PHYSICAL THERAPY ASSISTANT

## 2022-03-08 PROCEDURE — 97110 THERAPEUTIC EXERCISES: CPT | Mod: GP | Performed by: PHYSICAL THERAPY ASSISTANT

## 2022-03-15 ENCOUNTER — HOSPITAL ENCOUNTER (OUTPATIENT)
Dept: PHYSICAL THERAPY | Facility: REHABILITATION | Age: 67
Discharge: HOME | End: 2022-03-15
Payer: COMMERCIAL

## 2022-03-15 DIAGNOSIS — M25.60 JOINT STIFFNESS OF SPINE: ICD-10-CM

## 2022-03-15 DIAGNOSIS — G89.29 CHRONIC BILATERAL LOW BACK PAIN WITH BILATERAL SCIATICA: Primary | ICD-10-CM

## 2022-03-15 DIAGNOSIS — M54.41 CHRONIC BILATERAL LOW BACK PAIN WITH BILATERAL SCIATICA: Primary | ICD-10-CM

## 2022-03-15 DIAGNOSIS — M54.42 CHRONIC BILATERAL LOW BACK PAIN WITH BILATERAL SCIATICA: Primary | ICD-10-CM

## 2022-03-15 PROCEDURE — 97110 THERAPEUTIC EXERCISES: CPT | Mod: CQ | Performed by: PHYSICAL THERAPY ASSISTANT

## 2022-03-15 PROCEDURE — 97140 MANUAL THERAPY 1/> REGIONS: CPT | Mod: CQ | Performed by: PHYSICAL THERAPY ASSISTANT

## 2022-03-22 ENCOUNTER — HOSPITAL ENCOUNTER (OUTPATIENT)
Dept: PHYSICAL THERAPY | Facility: REHABILITATION | Age: 67
Discharge: HOME | End: 2022-03-22
Payer: COMMERCIAL

## 2022-03-22 DIAGNOSIS — G89.29 CHRONIC BILATERAL LOW BACK PAIN WITH BILATERAL SCIATICA: Primary | ICD-10-CM

## 2022-03-22 DIAGNOSIS — M54.42 CHRONIC BILATERAL LOW BACK PAIN WITH BILATERAL SCIATICA: Primary | ICD-10-CM

## 2022-03-22 DIAGNOSIS — M54.41 CHRONIC BILATERAL LOW BACK PAIN WITH BILATERAL SCIATICA: Primary | ICD-10-CM

## 2022-03-22 DIAGNOSIS — M25.60 JOINT STIFFNESS OF SPINE: ICD-10-CM

## 2022-03-22 PROCEDURE — 97110 THERAPEUTIC EXERCISES: CPT | Mod: GP | Performed by: PHYSICAL THERAPIST

## 2022-03-22 PROCEDURE — 97140 MANUAL THERAPY 1/> REGIONS: CPT | Mod: GP | Performed by: PHYSICAL THERAPIST

## 2022-03-29 ENCOUNTER — HOSPITAL ENCOUNTER (OUTPATIENT)
Dept: PHYSICAL THERAPY | Facility: REHABILITATION | Age: 67
Discharge: HOME | End: 2022-03-29
Payer: COMMERCIAL

## 2022-03-29 DIAGNOSIS — M54.41 CHRONIC BILATERAL LOW BACK PAIN WITH BILATERAL SCIATICA: Primary | ICD-10-CM

## 2022-03-29 DIAGNOSIS — G89.29 CHRONIC BILATERAL LOW BACK PAIN WITH BILATERAL SCIATICA: Primary | ICD-10-CM

## 2022-03-29 DIAGNOSIS — M54.42 CHRONIC BILATERAL LOW BACK PAIN WITH BILATERAL SCIATICA: Primary | ICD-10-CM

## 2022-03-29 DIAGNOSIS — M25.60 JOINT STIFFNESS OF SPINE: ICD-10-CM

## 2022-03-29 PROCEDURE — 97110 THERAPEUTIC EXERCISES: CPT | Mod: GP | Performed by: PHYSICAL THERAPIST

## 2022-03-29 PROCEDURE — 97140 MANUAL THERAPY 1/> REGIONS: CPT | Mod: GP | Performed by: PHYSICAL THERAPIST

## 2022-04-23 ENCOUNTER — TRANSFERRED RECORDS (OUTPATIENT)
Dept: HEALTH INFORMATION MANAGEMENT | Facility: CLINIC | Age: 67
End: 2022-04-23
Payer: COMMERCIAL

## 2022-05-12 ENCOUNTER — TRANSCRIBE ORDERS (OUTPATIENT)
Dept: PHYSICAL MEDICINE AND REHAB | Facility: CLINIC | Age: 67
End: 2022-05-12
Payer: COMMERCIAL

## 2022-05-12 DIAGNOSIS — M51.369 LUMBAR DEGENERATIVE DISC DISEASE: Primary | ICD-10-CM

## 2022-05-18 NOTE — PROGRESS NOTES
ASSESSMENT: Saw Sung Dubon is a 67 year old male with past medical history significant for hyperlipidemia, GERD who presents today for new patient evaluation of chronic and progressive low back pain with bilateral lower extremity pain and paresthesias.  My review of an MRI lumbar spine shows a disc bulge at L4-5 with mild spinal canal stenosis and mild bilateral foraminal stenosis.  There is also mild bilateral foraminal stenosis L3-4.  Patient does describe claudicating type back pain with limited standing and walking tolerance.  Unclear at this point if leg symptoms are radicular in nature.  He has bilateral knee pain which could be related to osteoarthritis.  He describes a burning pain in both feet which could be related to peripheral neuropathy.  - Patient demonstrated some weakness in the left ankle dorsiflexors evident when he tried to ambulate on the heel.  Otherwise he is neurologically intact.    PLAN:  A shared decision making model was used.  The patient's values and choices were respected.  The following represents what was discussed and decided upon by the physician assistant and the patient.  A telephone  was used for the visit.    1.  DIAGNOSTIC TESTS: I reviewed the MRI lumbar spine.  No further diagnostic tests were ordered.  - If feet symptoms persist, I recommend obtaining an EMG bilateral lower extremities.    2.  PHYSICAL THERAPY: Patient completed 5 sessions of physical therapy in March 2022.  He does his home exercises.  Unfortunately are not helping.  No further physical therapy was ordered.    3.  MEDICATIONS: No changes are made to the patient's medications.  - Patient takes Lyrica 50 mg twice daily.  This is somewhat helpful but does cause some drowsiness.  - Gabapentin was not helpful.  - Patient takes Tylenol which helps somewhat.    4.  INTERVENTIONS:    -I offer the patient bilateral L4-5 transforaminal epidural steroid injections.  Pain has been refractory to conservative  treatment including physical therapy, neuropathic pain medication.  I chose to start with epidural steroid injections because he does describe claudicating type back pain with limited walking and standing tolerance.  I would also like to see if the epidural steroid injections could provide some relief of his leg pain if it is indeed radicular.  Patient indicated he would like to proceed and an order was placed.  - If this does not help, we could consider bilateral L5-S1 transforaminal epidural steroid injections to target the L5 nerve roots which I believe are being trapped in the lateral recesses at L4-5.  - If leg symptoms improved but he continues have axial low back pain I would recommend bilateral L3, L4, L5 medial branch blocks as a work-up toward radiofrequency ablation.    5.  PATIENT EDUCATION: Patient is in agreement the above plan.  All questions were answered.    6.  FOLLOW-UP:   Patient will follow up with me 2 weeks after his bilateral L4-5 transforaminal epidural steroid injections.  If he has questions or concerns in the meantime, he should not hesitate to call.      SUBJECTIVE:  Saw Sung Dubon  Is a 67 year old male who presents today in consultation at request of Dr. Stern for new patient evaluation of low back pain and bilateral lower extremity pain with associated numbness, tingling, weakness.  Patient reports that he has had back pain for over 10 years.  She denies any specific injury but states that he did quite a bit of physical labor when he was younger.  Pain is getting gradually worse.    Patient complains of centralized bilateral low back pain.  He describes this pain as an aching and burning pain.  It is aggravated with walking and standing.  He states he is limited to about 10 minutes of walking and standing before he has to stop and sit.  Sitting provides some relief.  He then describes pain in the bilateral knees and a burning pain in the bilateral feet.  Patient reports the entire foot  is affected bilaterally.  He feels a sensation of weakness in both legs.  Patient also endorses some pain between the shoulder blades but he states that is not as severe as his lower back pain and would like to focus on his lower back pain today.  He denies loss of bowel or bladder control.  Denies any recent fevers, chills, sweats.      Patient completed 5 sessions of physical therapy in March 2022.  He does his home exercises on a regular basis but he does not feel like they are helping.  He Does not go to a chiropractor.  He has never had any spine surgeries or spine injections.  Patient takes pregabalin 50 mg twice daily which helps somewhat but does cause slight drowsiness.  Gabapentin was not helpful.  Tylenol provides some relief as well.    Current Outpatient Medications   Medication     acetaminophen (TYLENOL) 325 MG tablet     aspirin (ASPIRIN LOW DOSE) 81 MG EC tablet     atorvastatin (LIPITOR) 20 MG tablet     Ivan Protect (EUCERIN) external cream     Calamine external lotion     calcium carbonate (TUMS) 500 MG chewable tablet     capsaicin (ZOSTRIX) 0.025 % external cream     cetirizine (ZYRTEC) 10 MG tablet     diclofenac (VOLTAREN) 1 % topical gel     famotidine (PEPCID) 20 MG tablet     gabapentin (NEURONTIN) 300 MG capsule     hydrocortisone (WESTCORT) 0.2 % external cream     permethrin (ELIMITE) 5 % external cream     Skin Protectants, Misc. (EUCERIN) cream         Allergies   Allergen Reactions     Nkda [No Known Drug Allergies]        Past Medical History:   Diagnosis Date     Anemia         Patient Active Problem List   Diagnosis     GERD (gastroesophageal reflux disease)     Back pain     Urinary frequency     Neuropathy     Lichen simplex chronicus     Beta thalassemia trait       No past surgical history on file.    Family History   Problem Relation Age of Onset     Diabetes No family hx of      Cancer No family hx of      Heart Disease No family hx of      Coronary Artery Disease No family  hx of      Hypertension No family hx of      Hyperlipidemia No family hx of      Breast Cancer No family hx of      Cancer - colorectal No family hx of      Ovarian Cancer No family hx of      Prostate Cancer No family hx of      Other Cancer No family hx of      Mental Illness No family hx of      Cerebrovascular Disease No family hx of      Anesthesia Reaction No family hx of      Asthma No family hx of      Osteoporosis No family hx of      Known Genetic Syndrome No family hx of      Obesity No family hx of      Unknown/Adopted No family hx of        Social history: Patient is .  He does not work outside the home.  He denies tobacco, alcohol, or illicit drug use.      ROS: Positive for changes in vision, abdominal pain, joint pain, muscle pain, itching.  Specifically negative for bowel/bladder dysfunction, fevers,chills, appetite changes, unexplained weight loss.   Otherwise 13 systems reviewed are negative.  Please see the patient's intake questionnaire from today for details.      OBJECTIVE:  PHYSICAL EXAMINATION:    CONSTITUTIONAL:  Vital signs as above.  No acute distress.  The patient is well nourished and well groomed.  PSYCHIATRIC:  The patient is awake, alert, oriented to person, place, time and answering questions appropriately with clear speech.    HEENT: Normocephalic, atraumatic.  Sclera clear.  Neck is supple.  SKIN:  Skin over the face, bilateral lower extremities, and posterior torso is clean, dry, intact without rashes.    GAIT:  Gait is non-antalgic.  Patient demonstrates some difficulty ambulating on the heel on the left.  Able ambulate on toes bilaterally.  STANDING EXAMINATION: No significant tenderness to palpation bilateral lower lumbar paraspinous muscles.  Lumbar flexion moderately restricted.  Lumbar extension mildly restricted.  MUSCLE STRENGTH:  The patient has 5/5 strength for the bilateral hip flexors, knee flexors/extensors, ankle dorsiflexors/plantar flexors, great toe  extensors, ankle evertors/invertors.  NEUROLOGICAL: 1+ patellar, and achilles reflexes bilaterally.  Negative Babinski's bilaterally.  No ankle clonus bilaterally. Sensation to light touch is intact in the bilateral L4, L5, and S1 dermatomes.  VASCULAR:  2/4 dorsalis pedis pulses bilaterally.  Bilateral lower extremities are warm.  There is no pitting edema of the bilateral lower extremities.  ABDOMINAL:  Soft, non-distended, non-tender throughout all quadrants.  No pulsatile mass palpated in the left lower quadrant.  LYMPH NODES:  No palpable or tender inguinal lymph nodes.  MUSCULOSKELETAL: Straight leg raise is negative bilaterally.    RESULTS: I reviewed the MRI lumbar spine from [facility] dated April 23, 2022.  At L4-5 there is a disc bulge with mild spinal canal stenosis and mild bilateral foraminal stenosis.  There is also mild bilateral foraminal stenosis L3-4.  Please report for further details.

## 2022-05-19 ENCOUNTER — OFFICE VISIT (OUTPATIENT)
Dept: PHYSICAL MEDICINE AND REHAB | Facility: CLINIC | Age: 67
End: 2022-05-19
Attending: FAMILY MEDICINE
Payer: COMMERCIAL

## 2022-05-19 VITALS
HEIGHT: 60 IN | WEIGHT: 130 LBS | TEMPERATURE: 97.9 F | SYSTOLIC BLOOD PRESSURE: 119 MMHG | BODY MASS INDEX: 25.52 KG/M2 | HEART RATE: 55 BPM | DIASTOLIC BLOOD PRESSURE: 54 MMHG

## 2022-05-19 DIAGNOSIS — M47.816 LUMBAR FACET ARTHROPATHY: ICD-10-CM

## 2022-05-19 DIAGNOSIS — M51.369 LUMBAR DEGENERATIVE DISC DISEASE: ICD-10-CM

## 2022-05-19 DIAGNOSIS — M48.062 SPINAL STENOSIS OF LUMBAR REGION WITH NEUROGENIC CLAUDICATION: Primary | ICD-10-CM

## 2022-05-19 DIAGNOSIS — R20.2 PARESTHESIA: ICD-10-CM

## 2022-05-19 PROCEDURE — 99204 OFFICE O/P NEW MOD 45 MIN: CPT | Performed by: PHYSICIAN ASSISTANT

## 2022-05-19 RX ORDER — PREGABALIN 50 MG/1
CAPSULE ORAL
COMMUNITY
Start: 2022-04-15 | End: 2023-11-17

## 2022-05-19 ASSESSMENT — PAIN SCALES - GENERAL: PAINLEVEL: EXTREME PAIN (8)

## 2022-05-19 NOTE — LETTER
5/19/2022         RE: Manpreet Dubon  [address] [place] [address]        Dear Colleague,    Thank you for referring your patient, Manpreet Dubon, to the [facility]. Please see a copy of my visit note below.    ASSESSMENT: Saw Sung Dubon is a 67 year old male with past medical history significant for hyperlipidemia, GERD who presents today for new patient evaluation of chronic and progressive low back pain with bilateral lower extremity pain and paresthesias.  My review of an MRI lumbar spine shows a disc bulge at L4-5 with mild spinal canal stenosis and mild bilateral foraminal stenosis.  There is also mild bilateral foraminal stenosis L3-4.  Patient does describe claudicating type back pain with limited standing and walking tolerance.  Unclear at this point if leg symptoms are radicular in nature.  He has bilateral knee pain which could be related to osteoarthritis.  He describes a burning pain in both feet which could be related to peripheral neuropathy.  - Patient demonstrated some weakness in the left ankle dorsiflexors evident when he tried to ambulate on the heel.  Otherwise he is neurologically intact.    PLAN:  A shared decision making model was used.  The patient's values and choices were respected.  The following represents what was discussed and decided upon by the physician assistant and the patient.  A telephone  was used for the visit.    1.  DIAGNOSTIC TESTS: I reviewed the MRI lumbar spine.  No further diagnostic tests were ordered.  - If feet symptoms persist, I recommend obtaining an EMG bilateral lower extremities.    2.  PHYSICAL THERAPY: Patient completed 5 sessions of physical therapy in March 2022.  He does his home exercises.  Unfortunately are not helping.  No further physical therapy was ordered.    3.  MEDICATIONS: No changes are made to the patient's medications.  - Patient takes Lyrica 50 mg twice daily.  This is somewhat helpful but  does cause some drowsiness.  - Gabapentin was not helpful.  - Patient takes Tylenol which helps somewhat.    4.  INTERVENTIONS:    -I offer the patient bilateral L4-5 transforaminal epidural steroid injections.  Pain has been refractory to conservative treatment including physical therapy, neuropathic pain medication.  I chose to start with epidural steroid injections because he does describe claudicating type back pain with limited walking and standing tolerance.  I would also like to see if the epidural steroid injections could provide some relief of his leg pain if it is indeed radicular.  Patient indicated he would like to proceed and an order was placed.  - If this does not help, we could consider bilateral L5-S1 transforaminal epidural steroid injections to target the L5 nerve roots which I believe are being trapped in the lateral recesses at L4-5.  - If leg symptoms improved but he continues have axial low back pain I would recommend bilateral L3, L4, L5 medial branch blocks as a work-up toward radiofrequency ablation.    5.  PATIENT EDUCATION: Patient is in agreement the above plan.  All questions were answered.    6.  FOLLOW-UP:   Patient will follow up with me 2 weeks after his bilateral L4-5 transforaminal epidural steroid injections.  If he has questions or concerns in the meantime, he should not hesitate to call.      SUBJECTIVE:  Saw Sung Dubon  Is a 67 year old male who presents today in consultation at request of Dr. Stern for new patient evaluation of low back pain and bilateral lower extremity pain with associated numbness, tingling, weakness.  Patient reports that he has had back pain for over 10 years.  She denies any specific injury but states that he did quite a bit of physical labor when he was younger.  Pain is getting gradually worse.    Patient complains of centralized bilateral low back pain.  He describes this pain as an aching and burning pain.  It is aggravated with walking and standing.   He states he is limited to about 10 minutes of walking and standing before he has to stop and sit.  Sitting provides some relief.  He then describes pain in the bilateral knees and a burning pain in the bilateral feet.  Patient reports the entire foot is affected bilaterally.  He feels a sensation of weakness in both legs.  Patient also endorses some pain between the shoulder blades but he states that is not as severe as his lower back pain and would like to focus on his lower back pain today.  He denies loss of bowel or bladder control.  Denies any recent fevers, chills, sweats.      Patient completed 5 sessions of physical therapy in March 2022.  He does his home exercises on a regular basis but he does not feel like they are helping.  He Does not go to a chiropractor.  He has never had any spine surgeries or spine injections.  Patient takes pregabalin 50 mg twice daily which helps somewhat but does cause slight drowsiness.  Gabapentin was not helpful.  Tylenol provides some relief as well.    Current Outpatient Medications   Medication     acetaminophen (TYLENOL) 325 MG tablet     aspirin (ASPIRIN LOW DOSE) 81 MG EC tablet     atorvastatin (LIPITOR) 20 MG tablet     Ivan Protect (EUCERIN) external cream     Calamine external lotion     calcium carbonate (TUMS) 500 MG chewable tablet     capsaicin (ZOSTRIX) 0.025 % external cream     cetirizine (ZYRTEC) 10 MG tablet     diclofenac (VOLTAREN) 1 % topical gel     famotidine (PEPCID) 20 MG tablet     gabapentin (NEURONTIN) 300 MG capsule     hydrocortisone (WESTCORT) 0.2 % external cream     permethrin (ELIMITE) 5 % external cream     Skin Protectants, Misc. (EUCERIN) cream         Allergies   Allergen Reactions     Nkda [No Known Drug Allergies]        Past Medical History:   Diagnosis Date     Anemia         Patient Active Problem List   Diagnosis     GERD (gastroesophageal reflux disease)     Back pain     Urinary frequency     Neuropathy     Lichen simplex  chronicus     Beta thalassemia trait       No past surgical history on file.    Family History   Problem Relation Age of Onset     Diabetes No family hx of      Cancer No family hx of      Heart Disease No family hx of      Coronary Artery Disease No family hx of      Hypertension No family hx of      Hyperlipidemia No family hx of      Breast Cancer No family hx of      Cancer - colorectal No family hx of      Ovarian Cancer No family hx of      Prostate Cancer No family hx of      Other Cancer No family hx of      Mental Illness No family hx of      Cerebrovascular Disease No family hx of      Anesthesia Reaction No family hx of      Asthma No family hx of      Osteoporosis No family hx of      Known Genetic Syndrome No family hx of      Obesity No family hx of      Unknown/Adopted No family hx of        Social history: Patient is .  He does not work outside the home.  He denies tobacco, alcohol, or illicit drug use.      ROS: Positive for changes in vision, abdominal pain, joint pain, muscle pain, itching.  Specifically negative for bowel/bladder dysfunction, fevers,chills, appetite changes, unexplained weight loss.   Otherwise 13 systems reviewed are negative.  Please see the patient's intake questionnaire from today for details.      OBJECTIVE:  PHYSICAL EXAMINATION:    CONSTITUTIONAL:  Vital signs as above.  No acute distress.  The patient is well nourished and well groomed.  PSYCHIATRIC:  The patient is awake, alert, oriented to person, place, time and answering questions appropriately with clear speech.    HEENT: Normocephalic, atraumatic.  Sclera clear.  Neck is supple.  SKIN:  Skin over the face, bilateral lower extremities, and posterior torso is clean, dry, intact without rashes.    GAIT:  Gait is non-antalgic.  Patient demonstrates some difficulty ambulating on the heel on the left.  Able ambulate on toes bilaterally.  STANDING EXAMINATION: No significant tenderness to palpation bilateral lower  lumbar paraspinous muscles.  Lumbar flexion moderately restricted.  Lumbar extension mildly restricted.  MUSCLE STRENGTH:  The patient has 5/5 strength for the bilateral hip flexors, knee flexors/extensors, ankle dorsiflexors/plantar flexors, great toe extensors, ankle evertors/invertors.  NEUROLOGICAL: 1+ patellar, and achilles reflexes bilaterally.  Negative Babinski's bilaterally.  No ankle clonus bilaterally. Sensation to light touch is intact in the bilateral L4, L5, and S1 dermatomes.  VASCULAR:  2/4 dorsalis pedis pulses bilaterally.  Bilateral lower extremities are warm.  There is no pitting edema of the bilateral lower extremities.  ABDOMINAL:  Soft, non-distended, non-tender throughout all quadrants.  No pulsatile mass palpated in the left lower quadrant.  LYMPH NODES:  No palpable or tender inguinal lymph nodes.  MUSCULOSKELETAL: Straight leg raise is negative bilaterally.    RESULTS: I reviewed the MRI lumbar spine from [facility] dated April 23, 2022.  At L4-5 there is a disc bulge with mild spinal canal stenosis and mild bilateral foraminal stenosis.  There is also mild bilateral foraminal stenosis L3-4.  Please report for further details.        Again, thank you for allowing me to participate in the care of your patient.        Sincerely,        Alison Santiago PA-C

## 2022-05-19 NOTE — PATIENT INSTRUCTIONS
Bilateral L4/5 epidural steroid injections has been ordered today.      Please note that this injection uses cortisone.  The cortisone may somewhat weaken the immune system.  It is unknown how much the immune system is weakened.  It is unknown if it is weakened to the point that you may be more likely to get the COVID-19 virus, or if you do get the COVID-19 virus, if you would be sicker than you would have been if you had not had the cortisone injection.  If you do not wish to proceed with the injection, please let the nurse/physician know and do NOT schedule the injection.    Please note that since your immune system is weakened from the cortisone, having any vaccine/shot may be less effective if you have this vaccine within 2 weeks from your cortisone injection.  It is advised to wait 2 weeks after your cortisone injection to have any vaccine (or if you have a vaccine first, wait 2 weeks before you have the cortisone injection).    Please schedule this injection at least 1 week  from now to allow time for insurance prior authorization.  On the day of your injection, you cannot be sick or taking antibiotics.  If you become sick and are prescribed, please call the clinic so your injection can be rescheduled for once you have completed your antibiotics.  You will need to bring a  with you for your injection.   If you have any questions or concerns prior to your injection, please do not hesitate to call the nurse navigation line at [phone] or contact Alison Santiago through [employer].

## 2022-08-10 ENCOUNTER — LAB REQUISITION (OUTPATIENT)
Dept: LAB | Facility: CLINIC | Age: 67
End: 2022-08-10
Payer: COMMERCIAL

## 2022-08-10 DIAGNOSIS — E78.5 HYPERLIPIDEMIA, UNSPECIFIED: ICD-10-CM

## 2022-08-10 DIAGNOSIS — Z12.5 ENCOUNTER FOR SCREENING FOR MALIGNANT NEOPLASM OF PROSTATE: ICD-10-CM

## 2022-08-10 DIAGNOSIS — R00.1 BRADYCARDIA, UNSPECIFIED: ICD-10-CM

## 2022-08-10 LAB
ANION GAP SERPL CALCULATED.3IONS-SCNC: 10 MMOL/L (ref 7–15)
BUN SERPL-MCNC: 12.6 MG/DL (ref 8–23)
CALCIUM SERPL-MCNC: 9.2 MG/DL (ref 8.8–10.2)
CHLORIDE SERPL-SCNC: 104 MMOL/L (ref 98–107)
CHOLEST SERPL-MCNC: 123 MG/DL
CREAT SERPL-MCNC: 1.16 MG/DL (ref 0.67–1.17)
DEPRECATED HCO3 PLAS-SCNC: 26 MMOL/L (ref 22–29)
GFR SERPL CREATININE-BSD FRML MDRD: 69 ML/MIN/1.73M2
GLUCOSE SERPL-MCNC: 95 MG/DL (ref 70–99)
HDLC SERPL-MCNC: 44 MG/DL
LDLC SERPL CALC-MCNC: 55 MG/DL
NONHDLC SERPL-MCNC: 79 MG/DL
POTASSIUM SERPL-SCNC: 4.7 MMOL/L (ref 3.4–5.3)
PSA SERPL-MCNC: 3.03 NG/ML (ref 0–4.5)
SODIUM SERPL-SCNC: 140 MMOL/L (ref 136–145)
TRIGL SERPL-MCNC: 120 MG/DL

## 2022-08-10 PROCEDURE — 80048 BASIC METABOLIC PNL TOTAL CA: CPT | Mod: ORL | Performed by: FAMILY MEDICINE

## 2022-08-10 PROCEDURE — 84443 ASSAY THYROID STIM HORMONE: CPT | Mod: ORL | Performed by: FAMILY MEDICINE

## 2022-08-10 PROCEDURE — 80061 LIPID PANEL: CPT | Mod: ORL | Performed by: FAMILY MEDICINE

## 2022-08-10 PROCEDURE — G0103 PSA SCREENING: HCPCS | Mod: ORL | Performed by: FAMILY MEDICINE

## 2022-08-11 ENCOUNTER — LAB REQUISITION (OUTPATIENT)
Dept: LAB | Facility: CLINIC | Age: 67
End: 2022-08-11
Payer: COMMERCIAL

## 2022-08-11 DIAGNOSIS — R00.1 BRADYCARDIA, UNSPECIFIED: ICD-10-CM

## 2022-08-11 LAB — TSH SERPL DL<=0.005 MIU/L-ACNC: 1.53 UIU/ML (ref 0.3–4.2)

## 2022-09-21 ENCOUNTER — RADIOLOGY INJECTION OFFICE VISIT (OUTPATIENT)
Dept: PHYSICAL MEDICINE AND REHAB | Facility: CLINIC | Age: 67
End: 2022-09-21
Attending: PHYSICIAN ASSISTANT
Payer: COMMERCIAL

## 2022-09-21 VITALS
OXYGEN SATURATION: 97 % | TEMPERATURE: 97.8 F | SYSTOLIC BLOOD PRESSURE: 118 MMHG | HEART RATE: 58 BPM | DIASTOLIC BLOOD PRESSURE: 62 MMHG

## 2022-09-21 DIAGNOSIS — M48.062 SPINAL STENOSIS OF LUMBAR REGION WITH NEUROGENIC CLAUDICATION: ICD-10-CM

## 2022-09-21 PROCEDURE — 64483 NJX AA&/STRD TFRM EPI L/S 1: CPT | Mod: 50 | Performed by: PHYSICAL MEDICINE & REHABILITATION

## 2022-09-21 RX ORDER — METHYLPREDNISOLONE ACETATE 80 MG/ML
INJECTION, SUSPENSION INTRA-ARTICULAR; INTRALESIONAL; INTRAMUSCULAR; SOFT TISSUE
Status: COMPLETED | OUTPATIENT
Start: 2022-09-21 | End: 2022-09-21

## 2022-09-21 RX ORDER — LIDOCAINE HYDROCHLORIDE 10 MG/ML
INJECTION, SOLUTION EPIDURAL; INFILTRATION; INTRACAUDAL; PERINEURAL
Status: COMPLETED | OUTPATIENT
Start: 2022-09-21 | End: 2022-09-21

## 2022-09-21 RX ADMIN — LIDOCAINE HYDROCHLORIDE 5 ML: 10 INJECTION, SOLUTION EPIDURAL; INFILTRATION; INTRACAUDAL; PERINEURAL at 13:50

## 2022-09-21 RX ADMIN — METHYLPREDNISOLONE ACETATE 80 MG: 80 INJECTION, SUSPENSION INTRA-ARTICULAR; INTRALESIONAL; INTRAMUSCULAR; SOFT TISSUE at 13:51

## 2022-09-21 ASSESSMENT — PAIN SCALES - GENERAL
PAINLEVEL: MILD PAIN (3)
PAINLEVEL: EXTREME PAIN (8)

## 2022-09-21 NOTE — PATIENT INSTRUCTIONS
Follow-up visit in 2 weeks with Alison OTERO to discuss injection outcome and determine care plan going forward.     DISCHARGE INSTRUCTIONS    During office hours (8:00 a.m.- 4:00 p.m.) questions or concerns may be answered  by calling [facility] Navigation Nurses at  [phone].  Messages received after hours will be returned the following business day.      In the case of an emergency, please dial 911 or seek assistance at the nearest Emergency Room/Urgent Care facility.     All Patients:    You may experience an increase in your symptoms for the first 2 days (It may take anywhere between 2 days- 2 weeks for the steroid to have maximum effect).    You may use ice on the injection site, as frequently as 20 minutes each hour if needed.    You may take your pain medicine.    You may continue taking your regular medication after your injection. If you have had a Medial Branch Block you may resume pain medication once your pain diary is completed.    You may shower. No swimming, tub bath or hot tub for 48 hours.  You may remove your bandaid/bandage as soon as you are home.    You may resume light activities, as tolerated.    Resume your usual diet as tolerated.    It is strongly advised that you do not drive for 1-3 hours post injection.    If you have had oral sedation:  Do not drive for 8 hours post injection.      If you have had IV sedation:  Do not drive for 24 hours post injection.  Do not operate hazardous machinery or make important personal/business decisions for 24 hours.      POSSIBLE STEROID SIDE EFFECTS (If steroid/cortisone was used for your procedure)    -If you experience these symptoms, it should only last for a short period    Swelling of the legs              Skin redness (flushing)     Mouth (oral) irritation   Blood sugar (glucose) levels            Sweats                    Mood changes  Headache  Sleeplessness  Weakened immune system for up to 14 days, which could increase the risk of  santhosh the COVID-19 virus and/or experiencing more severe symptoms of the disease, if exposed.  Decreased effectiveness of the flu vaccine if given within 2 weeks of the steroid.         POSSIBLE PROCEDURE SIDE EFFECTS  -Call the Spine Center if you are concerned  Increased Pain           Increased numbness/tingling      Nausea/Vomiting          Bruising/bleeding at site      Redness or swelling                                              Difficulty walking      Weakness           Fever greater than 100.5    *In the event of a severe headache after an epidural steroid injection that is relieved by lying down, please call the [facility] to speak with a clinical staff member*

## 2022-09-30 NOTE — PROGRESS NOTES
Assessment:   Saw Sung Dubon is a 67 year old y.o. male with past medical history significant for hyperlipidemia, GERD who presents today for follow-up regarding  chronic and progressive low back pain with bilateral lower extremity pain and paresthesias.  My review of an MRI lumbar spine shows a disc bulge at L4-5 with mild spinal canal stenosis and mild bilateral foraminal stenosis.  There is also mild bilateral foraminal stenosis L3-4.   Patient is following up after bilateral L4-5 transforaminal epidural steroid injections September 21, 2022 which only provided 30% relief of his pain.  He is neurologically intact on exam.       Plan:     A shared decision making plan was used.  The patient's values and choices were respected.  The following represents what was discussed and decided upon by the physician assistant and the patient.  A telephone  was used for the visit.    1.  DIAGNOSTIC TESTS:  I reviewed the MRI lumbar spine.  No further diagnostic tests were ordered.  -Patient describes a burning discomfort in both feet.  If this persist, we could check an EMG to evaluate for peripheral neuropathy.    2.  PHYSICAL THERAPY: Patient completed 5 sessions of physical therapy in March 2022.  He does his home exercises.  Unfortunately are not helping.  No further physical therapy was ordered.    3.  MEDICATIONS:  No changes are made to the patient's medications.  Patient did not bring his medications into today's visit he does not know what he takes aside from Tylenol which is helpful.    4.  INTERVENTIONS:    -  I offer the patient bilateral L5-S1 transforaminal epidural steroid injections to target the L5 nerve roots which I believe are being trapped in the lateral recesses at L4-5.  I explained how this would be different than his previous injection.  Hopefully will be more effective.  Patient indicated he would like to proceed and an order was placed.  - If leg symptoms improved but he continues have axial  low back pain I would recommend bilateral L3, L4, L5 medial branch blocks as a work-up toward radiofrequency ablation.    5.  PATIENT EDUCATION:  Patient is in agreement the above plan.  All questions were answered.    6.  FOLLOW-UP: Patient will follow up with me 2 weeks after his bilateral L5-S1 transforaminal epidural steroid injections.  If he has questions or concerns in the meantime, he should not hesitate to call.    Subjective:     Saw Sung Dubon is a 67 year old male who presents today for follow-up regarding low back pain and bilateral lower extremity pain with associated numbness, tingling, weakness.  Patient is following up after bilateral L4-5 transforaminal epidural steroid injection September 21, 2022.  Patient reports only 30% improvement in his pain.  Patient reports that he continues to have low back and leg pain but he states that the burning quality of the pain is somewhat improved.    Patient complains of bilateral low back pain.  Pain is located the lower lumbar region.  Pain radiates down the posterior and lateral aspects of the legs all the way to the feet.  He feels burning on the bottom of both feet.  He feels generally weak and exhausted but denies focal weakness in the legs.  He rates his pain today as a 6 out of 10.  He denies any aggravating or alleviating factors to the pain.  He denies any new symptoms since he was last seen.    Patient completed 5 sessions of physical therapy in March 2022.  He does his home exercises but he does not feel if they are helping his pain.  He does not know what medications he takes for pain other than Tylenol which is helpful.      Review of Systems:  Positive for numbness/tingling, weakness, headache, pain much worse at night.  Negative for loss of bowel/bladder control, inability to urinate, trip/stumble/falls, difficulty swallowing, difficulty with hand skills, fevers, unintentional weight loss.     Objective:   CONSTITUTIONAL:  Vital signs as above.   No acute distress.  The patient is well nourished and well groomed.    PSYCHIATRIC:  The patient is awake, alert, oriented to person, place and time.  The patient is answering questions appropriately with clear speech.  Normal affect.  HEENT: Normocephalic, atraumatic.  Sclera clear.    SKIN:  Skin over the face, posterior torso, bilateral upper and lower extremities is clean, dry, intact without rashes.  VASCULAR: No significant lower extremity edema.  MUSCULOSKELETAL:  Gait is non-antalgic.    Mild tenderness over the bilateral lower lumbar paraspinal muscles.      The patient has 5/5 strength for the bilateral hip flexors, knee flexors/extensors, ankle dorsiflexors/plantar flexors, ankle evertors/invertors.    NEUROLOGICAL: 1+ patellar, achilles reflexes which are symmetric bilaterally.  No ankle clonus bilaterally.  Sensation to light touch is intact in the bilateral L4, L5, and S1 dermatomes.       RESULTS:  I reviewed the MRI lumbar spine from [facility] dated April 23, 2022.  At L4-5 there is a disc bulge with mild spinal canal stenosis and mild bilateral foraminal stenosis.  There is also mild bilateral foraminal stenosis L3-4.  Please report for further details.

## 2022-10-05 NOTE — MR AVS SNAPSHOT
After Visit Summary   2017    Saw Jagdish    MRN: [id number]           Patient Information     Date Of Birth          1955        Visit Information        Provider Department      2017 11:00 AM Riley Purdy MD [facility]        Today's Diagnoses     Chronic bilateral low back pain with bilateral sciatica    -  1      Care Instructions    Doing somewhat better.  Less burning.  Tired though.    1) Take both capsules together at bedtime          Follow-ups after your visit        Who to contact     Please call your clinic at [phone] to:    Ask questions about your health    Make or cancel appointments    Discuss your medicines    Learn about your test results    Speak to your doctor   If you have compliments or concerns about an experience at your clinic, or if you wish to file a complaint, please contact [facility] Patient Relations at [phone] or email us at [email or web address]         Additional Information About Your Visit        MyChart Information     Rezeet is an electronic gateway that provides easy, online access to your medical records. With [email or web address], you can request a clinic appointment, read your test results, renew a prescription or communicate with your care team.     To sign up for Rezeet visit the website at [email or web address]   You will be asked to enter the access code listed below, as well as some personal information. Please follow the directions to create your username and password.     Your access code is: [email or web address]  Expires: 11/15/2017  8:55 AM     Your access code will  in 90 days. If you need help or a new code, please contact your [facility] or call [phone] for assistance.        Care EveryWhere ID     This is your Care EveryWhere ID. This could be used by other organizations to access your [place] medical records  [id number]        Your Vitals Were     Pulse  "    Lencho Matthew MD;  Location: [facility] Temperature Height BMI (Body Mass Index)          66 97.4  F (36.3  C) (Oral) 4' 11.75\" (151.8 cm) 26.98 kg/m2         Blood Pressure from Last 3 Encounters:   09/11/17 96/59   08/17/17 116/78   03/27/17 107/69    Weight from Last 3 Encounters:   09/11/17 137 lb (62.1 kg)   08/17/17 134 lb 9.6 oz (61.1 kg)   03/27/17 135 lb 9.6 oz (61.5 kg)              Today, you had the following     No orders found for display         Today's Medication Changes          These changes are accurate as of: 9/11/17 11:34 AM.  If you have any questions, ask your nurse or doctor.               These medicines have changed or have updated prescriptions.        Dose/Directions    gabapentin 300 MG capsule   Commonly known as:  NEURONTIN   This may have changed:    - how much to take  - when to take this   Used for:  Chronic bilateral low back pain with bilateral sciatica   Changed by:  Riley Purdy MD        Dose:  600 mg   Take 2 capsules (600 mg) by mouth At Bedtime   Quantity:  60 capsule   Refills:  11            Where to get your medicines      These medications were sent to [facility] - [place], [place] - [address], [place] [address]     Phone:  [phone]     gabapentin 300 MG capsule                Primary Care Provider Office Phone # Fax #    Leida NealDO [phone]       [address] [address]        Equal Access to Services     QUANG TOSCANO AH: Hadii patricia ku hadasho Soomaali, waaxda luqadaha, qaybta kaalmada adeegyada, waxay wander clarke adelakeshia villa. So [facility] [phone].    ATENCIÓN: Si habla español, tiene a matias disposición servicios gratuitos de asistencia lingüística. Llame al [phone].    We comply with applicable federal civil rights laws and [place] laws. We do not discriminate on the basis of race, color, national origin, age, disability sex, sexual orientation or gender identity.            Thank you!  " Thank you for choosing [facility]  for your care. Our goal is always to provide you with excellent care. Hearing back from our patients is one way we can continue to improve our services. Please take a few minutes to complete the written survey that you may receive in the mail after your visit with us. Thank you!             Your Updated Medication List - Protect others around you: Learn how to safely use, store and throw away your medicines at www.disposemymeds.org.          This list is accurate as of: 9/11/17 11:34 AM.  Always use your most recent med list.                   Brand Name Dispense Instructions for use Diagnosis    diphenhydrAMINE 25 MG tablet    BENADRYL    60 tablet    Take 1-2 tablets (25-50 mg) by mouth nightly as needed for itching or allergies    Xerotic eczema       gabapentin 300 MG capsule    NEURONTIN    60 capsule    Take 2 capsules (600 mg) by mouth At Bedtime    Chronic bilateral low back pain with bilateral sciatica       omeprazole 20 MG tablet     30 tablet    Take 1 tablet (20 mg) by mouth daily Take 30-60 minutes before a meal.    Gastroesophageal reflux disease, esophagitis presence not specified       ranitidine 150 MG tablet    ZANTAC    60 tablet    Take 1 tablet (150 mg) by mouth At Bedtime    Gastroesophageal reflux disease, esophagitis presence not specified       triamcinolone 0.1 % ointment    KENALOG    30 g    Apply sparingly to affected area three times daily for 14 days.    Xerotic eczema          (

## 2022-10-06 ENCOUNTER — OFFICE VISIT (OUTPATIENT)
Dept: PHYSICAL MEDICINE AND REHAB | Facility: CLINIC | Age: 67
End: 2022-10-06
Payer: COMMERCIAL

## 2022-10-06 VITALS
HEIGHT: 60 IN | WEIGHT: 130 LBS | SYSTOLIC BLOOD PRESSURE: 121 MMHG | DIASTOLIC BLOOD PRESSURE: 70 MMHG | BODY MASS INDEX: 25.52 KG/M2 | HEART RATE: 64 BPM

## 2022-10-06 DIAGNOSIS — M54.16 LUMBAR RADICULAR PAIN: Primary | ICD-10-CM

## 2022-10-06 PROCEDURE — 99214 OFFICE O/P EST MOD 30 MIN: CPT | Performed by: PHYSICIAN ASSISTANT

## 2022-10-06 ASSESSMENT — PAIN SCALES - GENERAL: PAINLEVEL: SEVERE PAIN (6)

## 2022-10-06 NOTE — LETTER
10/6/2022         RE: Manpreet Dubon  [address] [place] [address]        Dear Colleague,    Thank you for referring your patient, Manpreet Dubon, to the [facility]. Please see a copy of my visit note below.    Assessment:   Saw Sung Dubon is a 67 year old y.o. male with past medical history significant for hyperlipidemia, GERD who presents today for follow-up regarding  chronic and progressive low back pain with bilateral lower extremity pain and paresthesias.  My review of an MRI lumbar spine shows a disc bulge at L4-5 with mild spinal canal stenosis and mild bilateral foraminal stenosis.  There is also mild bilateral foraminal stenosis L3-4.   Patient is following up after bilateral L4-5 transforaminal epidural steroid injections September 21, 2022 which only provided 30% relief of his pain.  He is neurologically intact on exam.       Plan:     A shared decision making plan was used.  The patient's values and choices were respected.  The following represents what was discussed and decided upon by the physician assistant and the patient.  A telephone  was used for the visit.    1.  DIAGNOSTIC TESTS:  I reviewed the MRI lumbar spine.  No further diagnostic tests were ordered.  -Patient describes a burning discomfort in both feet.  If this persist, we could check an EMG to evaluate for peripheral neuropathy.    2.  PHYSICAL THERAPY: Patient completed 5 sessions of physical therapy in March 2022.  He does his home exercises.  Unfortunately are not helping.  No further physical therapy was ordered.    3.  MEDICATIONS:  No changes are made to the patient's medications.  Patient did not bring his medications into today's visit he does not know what he takes aside from Tylenol which is helpful.    4.  INTERVENTIONS:    -  I offer the patient bilateral L5-S1 transforaminal epidural steroid injections to target the L5 nerve roots which I believe are being trapped in the  lateral recesses at L4-5.  I explained how this would be different than his previous injection.  Hopefully will be more effective.  Patient indicated he would like to proceed and an order was placed.  - If leg symptoms improved but he continues have axial low back pain I would recommend bilateral L3, L4, L5 medial branch blocks as a work-up toward radiofrequency ablation.    5.  PATIENT EDUCATION:  Patient is in agreement the above plan.  All questions were answered.    6.  FOLLOW-UP: Patient will follow up with me 2 weeks after his bilateral L5-S1 transforaminal epidural steroid injections.  If he has questions or concerns in the meantime, he should not hesitate to call.    Subjective:     Saw Sung Dubon is a 67 year old male who presents today for follow-up regarding low back pain and bilateral lower extremity pain with associated numbness, tingling, weakness.  Patient is following up after bilateral L4-5 transforaminal epidural steroid injection September 21, 2022.  Patient reports only 30% improvement in his pain.  Patient reports that he continues to have low back and leg pain but he states that the burning quality of the pain is somewhat improved.    Patient complains of bilateral low back pain.  Pain is located the lower lumbar region.  Pain radiates down the posterior and lateral aspects of the legs all the way to the feet.  He feels burning on the bottom of both feet.  He feels generally weak and exhausted but denies focal weakness in the legs.  He rates his pain today as a 6 out of 10.  He denies any aggravating or alleviating factors to the pain.  He denies any new symptoms since he was last seen.    Patient completed 5 sessions of physical therapy in March 2022.  He does his home exercises but he does not feel if they are helping his pain.  He does not know what medications he takes for pain other than Tylenol which is helpful.      Review of Systems:  Positive for numbness/tingling, weakness, headache,  pain much worse at night.  Negative for loss of bowel/bladder control, inability to urinate, trip/stumble/falls, difficulty swallowing, difficulty with hand skills, fevers, unintentional weight loss.     Objective:   CONSTITUTIONAL:  Vital signs as above.  No acute distress.  The patient is well nourished and well groomed.    PSYCHIATRIC:  The patient is awake, alert, oriented to person, place and time.  The patient is answering questions appropriately with clear speech.  Normal affect.  HEENT: Normocephalic, atraumatic.  Sclera clear.    SKIN:  Skin over the face, posterior torso, bilateral upper and lower extremities is clean, dry, intact without rashes.  VASCULAR: No significant lower extremity edema.  MUSCULOSKELETAL:  Gait is non-antalgic.    Mild tenderness over the bilateral lower lumbar paraspinal muscles.      The patient has 5/5 strength for the bilateral hip flexors, knee flexors/extensors, ankle dorsiflexors/plantar flexors, ankle evertors/invertors.    NEUROLOGICAL: 1+ patellar, achilles reflexes which are symmetric bilaterally.  No ankle clonus bilaterally.  Sensation to light touch is intact in the bilateral L4, L5, and S1 dermatomes.       RESULTS:  I reviewed the MRI lumbar spine from [facility] dated April 23, 2022.  At L4-5 there is a disc bulge with mild spinal canal stenosis and mild bilateral foraminal stenosis.  There is also mild bilateral foraminal stenosis L3-4.  Please report for further details.          Again, thank you for allowing me to participate in the care of your patient.        Sincerely,        Alison Santiago PA-C

## 2022-10-06 NOTE — PATIENT INSTRUCTIONS
Bilateral L5/S1 epidural steroid injections have been ordered today.      Please note that this injection uses cortisone.  The cortisone may somewhat weaken the immune system.  It is unknown how much the immune system is weakened.  It is unknown if it is weakened to the point that you may be more likely to get the COVID-19 virus, or if you do get the COVID-19 virus, if you would be sicker than you would have been if you had not had the cortisone injection.  If you do not wish to proceed with the injection, please let the nurse/physician know and do NOT schedule the injection.    Please note that since your immune system is weakened from the cortisone, having any vaccine/shot may be less effective if you have this vaccine within 2 weeks from your cortisone injection.  It is advised to wait 2 weeks after your cortisone injection to have any vaccine (or if you have a vaccine first, wait 2 weeks before you have the cortisone injection).    Please schedule this injection at least 1 week  from now to allow time for insurance prior authorization.  On the day of your injection, you cannot be sick or taking antibiotics.  If you become sick and are prescribed, please call the clinic so your injection can be rescheduled for once you have completed your antibiotics.  You will need to bring a  with you for your injection.   If you have any questions or concerns prior to your injection, please do not hesitate to call the nurse navigation line at [phone] or contact Alison Santiago through [employer].

## 2022-10-26 ENCOUNTER — RADIOLOGY INJECTION OFFICE VISIT (OUTPATIENT)
Dept: PHYSICAL MEDICINE AND REHAB | Facility: CLINIC | Age: 67
End: 2022-10-26
Attending: PHYSICIAN ASSISTANT
Payer: COMMERCIAL

## 2022-10-26 VITALS
HEART RATE: 58 BPM | RESPIRATION RATE: 16 BRPM | TEMPERATURE: 98.4 F | DIASTOLIC BLOOD PRESSURE: 58 MMHG | OXYGEN SATURATION: 96 % | SYSTOLIC BLOOD PRESSURE: 130 MMHG

## 2022-10-26 DIAGNOSIS — M54.16 LUMBAR RADICULAR PAIN: ICD-10-CM

## 2022-10-26 PROCEDURE — 64483 NJX AA&/STRD TFRM EPI L/S 1: CPT | Mod: 50 | Performed by: PAIN MEDICINE

## 2022-10-26 RX ORDER — DEXAMETHASONE SODIUM PHOSPHATE 10 MG/ML
INJECTION, SOLUTION INTRAMUSCULAR; INTRAVENOUS
Status: COMPLETED | OUTPATIENT
Start: 2022-10-26 | End: 2022-10-26

## 2022-10-26 RX ORDER — LIDOCAINE HYDROCHLORIDE 10 MG/ML
INJECTION, SOLUTION EPIDURAL; INFILTRATION; INTRACAUDAL; PERINEURAL
Status: COMPLETED | OUTPATIENT
Start: 2022-10-26 | End: 2022-10-26

## 2022-10-26 RX ADMIN — DEXAMETHASONE SODIUM PHOSPHATE 10 MG: 10 INJECTION, SOLUTION INTRAMUSCULAR; INTRAVENOUS at 15:33

## 2022-10-26 RX ADMIN — LIDOCAINE HYDROCHLORIDE 4 ML: 10 INJECTION, SOLUTION EPIDURAL; INFILTRATION; INTRACAUDAL; PERINEURAL at 15:32

## 2022-10-26 ASSESSMENT — PAIN SCALES - GENERAL
PAINLEVEL: SEVERE PAIN (6)
PAINLEVEL: NO PAIN (0)

## 2022-10-26 NOTE — PATIENT INSTRUCTIONS
Follow-up visit with BRANDEN Garcia in 2 weeks to discuss injection outcome and determine care plan going forward.       DISCHARGE INSTRUCTIONS    During office hours (8:00 a.m.- 4:00 p.m.) questions or concerns may be answered  by calling Spine Center Navigation Nurses at  [phone].  Messages received after hours will be returned the following business day.      In the case of an emergency, please dial 911 or seek assistance at the nearest Emergency Room/Urgent Care facility.     All Patients:    You may experience an increase in your symptoms for the first 2 days (It may take anywhere between 2 days- 2 weeks for the steroid to have maximum effect).    You may use ice on the injection site, as frequently as 20 minutes each hour if needed.    You may take your pain medicine.    You may continue taking your regular medication after your injection. If you have had a Medial Branch Block you may resume pain medication once your pain diary is completed.    You may shower. No swimming, tub bath or hot tub for 48 hours.  You may remove your bandaid/bandage as soon as you are home.    You may resume light activities, as tolerated.    Resume your usual diet as tolerated.    It is strongly advised that you do not drive for 1-3 hours post injection.    If you have had oral sedation:  Do not drive for 8 hours post injection.      If you have had IV sedation:  Do not drive for 24 hours post injection.  Do not operate hazardous machinery or make important personal/business decisions for 24 hours.      POSSIBLE STEROID SIDE EFFECTS (If steroid/cortisone was used for your procedure)    -If you experience these symptoms, it should only last for a short period    Swelling of the legs              Skin redness (flushing)     Mouth (oral) irritation   Blood sugar (glucose) levels            Sweats                    Mood changes  Headache  Sleeplessness  Weakened immune system for up to 14 days, which could increase the risk of  santhosh the COVID-19 virus and/or experiencing more severe symptoms of the disease, if exposed.  Decreased effectiveness of the flu vaccine if given within 2 weeks of the steroid.         POSSIBLE PROCEDURE SIDE EFFECTS  -Call the Spine Center if you are concerned  Increased Pain           Increased numbness/tingling      Nausea/Vomiting          Bruising/bleeding at site      Redness or swelling                                              Difficulty walking      Weakness           Fever greater than 100.5    *In the event of a severe headache after an epidural steroid injection that is relieved by lying down, please call the [facility] to speak with a clinical staff member*

## 2022-11-07 NOTE — PROGRESS NOTES
Assessment:   Saw Sung Dubon is a 67 year old y.o. male with past medical history significant for hyperlipidemia, GERD who presents today for follow-up regarding  chronic and progressive low back pain with bilateral lower extremity pain and paresthesias.  My review of an MRI lumbar spine shows a disc bulge at L4-5 with mild spinal canal stenosis and mild bilateral foraminal stenosis.  There is also mild bilateral foraminal stenosis L3-4.   Patient is following up after bilateral L5-S1 transforaminal epidural steroid injections October 26, 2022 which provided 70 to 80% relief of his pain.  He has only mild residual midline low back pain.  He has no longer having any lower extremity pain or paresthesias.  He states these injections were much more effective than the bilateral L4-5 transforaminal epidural steroid injections.  - He is neurologically intact on exam.       Plan:     A shared decision making plan was used.  The patient's values and choices were respected.  The following represents what was discussed and decided upon by the physician assistant and the patient.  A telephone  was used for the visit.    1.  DIAGNOSTIC TESTS:  I reviewed the MRI lumbar spine.  No further diagnostic tests were ordered.  Paresthesias in the feet resolved so he does not need an EMG.    2.  PHYSICAL THERAPY: Patient completed 5 sessions of physical therapy in March 2022.  He does his home exercises.   No further physical therapy was ordered.    3.  MEDICATIONS:  No changes are made to the patient's medications.  Patient takes pregabalin 50 mg daily.  He takes Tylenol as needed.    4.  INTERVENTIONS: No further interventions were ordered.  -If leg symptoms return I would recommend repeating the bilateral L5-S1 transforaminal epidural steroid injections.  - If axial low back pain worsens we could try L3, L4, L5 medial branch blocks as a work-up toward radiofrequency ablation.    - Bilateral L4-5 transforaminal epidural  steroid injections September 21, 2022 provided 30% relief of his pain.    5.  PATIENT EDUCATION:  Patient is in agreement the above plan.  All questions were answered.    6.  FOLLOW-UP: Patient is going to follow-up with me as needed.  If he has questions or concerns, he should not hesitate to call.    Subjective:     Saw Sung Dubon is a 67 year old male who presents today for follow-up regarding low back pain and bilateral lower extremity pain with associated numbness, tingling, weakness.  Patient is following up after bilateral L5-S1 transforaminal epidural steroid injections October 26, 2022.  Patient reports 70 to 80% improvement in his symptoms.    Patient complains of mild residual midline low back pain.  He denies any pain radiating in the buttocks or down the legs.  He states the numbness, tingling, burning down the legs has resolved completely.  Denies weakness.  He rates his pain today as a 4 out of 10.  He denies any aggravating or alleviating factors for his pain.    Patient completed 5 sessions of physical therapy in March 2022.  He does his home exercises.  He states that he takes Tylenol as needed.  He takes pregabalin 50 mg daily.  Medications are helpful.    Review of Systems:  Positive for headache.  Negative for numbness/tingling, weakness, loss of bowel/bladder control, inability to urinate, pain much worse in neck, trip/stumble/falls, difficulty swallowing, difficulty with hand skills, fevers, unintentional weight loss.     Objective:   CONSTITUTIONAL:  Vital signs as above.  No acute distress.  The patient is well nourished and well groomed.    PSYCHIATRIC:  The patient is awake, alert, oriented to person, place and time.  The patient is answering questions appropriately with clear speech.  Normal affect.  HEENT: Normocephalic, atraumatic.  Sclera clear.    SKIN:  Skin over the face, posterior torso, bilateral upper and lower extremities is clean, dry, intact without rashes.  VASCULAR: No  significant lower extremity edema.  MUSCULOSKELETAL:  Gait is non-antalgic.   No tenderness palpation midline lumbar spine.  No tenderness palpation bilateral lumbar paraspinous muscles.   The patient has 5/5 strength for the bilateral hip flexors, knee flexors/extensors, ankle dorsiflexors/plantar flexors, ankle evertors/invertors.   NEUROLOGICAL:  Sensation to light touch is intact in the bilateral L4, L5, and S1 dermatomes.       RESULTS:  I reviewed the MRI lumbar spine from [facility] dated April 23, 2022.  At L4-5 there is a disc bulge with mild spinal canal stenosis and mild bilateral foraminal stenosis.  There is also mild bilateral foraminal stenosis L3-4.  Please report for further details.

## 2022-11-09 ENCOUNTER — OFFICE VISIT (OUTPATIENT)
Dept: PHYSICAL MEDICINE AND REHAB | Facility: CLINIC | Age: 67
End: 2022-11-09
Payer: COMMERCIAL

## 2022-11-09 VITALS
BODY MASS INDEX: 25.52 KG/M2 | HEIGHT: 60 IN | DIASTOLIC BLOOD PRESSURE: 59 MMHG | HEART RATE: 61 BPM | WEIGHT: 130 LBS | SYSTOLIC BLOOD PRESSURE: 114 MMHG

## 2022-11-09 DIAGNOSIS — M54.16 LUMBAR RADICULAR PAIN: Primary | ICD-10-CM

## 2022-11-09 PROCEDURE — 99213 OFFICE O/P EST LOW 20 MIN: CPT | Performed by: PHYSICIAN ASSISTANT

## 2022-11-09 RX ORDER — CLOBETASOL PROPIONATE 0.5 MG/G
CREAM TOPICAL
COMMUNITY
Start: 2022-09-08

## 2022-11-09 NOTE — LETTER
11/9/2022         RE: Manpreet Dubon  [address] [place] [address]        Dear Colleague,    Thank you for referring your patient, Saw Sung Dubon, to the [facility]. Please see a copy of my visit note below.    Assessment:   Saw Sung Dubon is a 67 year old y.o. male with past medical history significant for hyperlipidemia, GERD who presents today for follow-up regarding  chronic and progressive low back pain with bilateral lower extremity pain and paresthesias.  My review of an MRI lumbar spine shows a disc bulge at L4-5 with mild spinal canal stenosis and mild bilateral foraminal stenosis.  There is also mild bilateral foraminal stenosis L3-4.   Patient is following up after bilateral L5-S1 transforaminal epidural steroid injections October 26, 2022 which provided 70 to 80% relief of his pain.  He has only mild residual midline low back pain.  He has no longer having any lower extremity pain or paresthesias.  He states these injections were much more effective than the bilateral L4-5 transforaminal epidural steroid injections.  - He is neurologically intact on exam.       Plan:     A shared decision making plan was used.  The patient's values and choices were respected.  The following represents what was discussed and decided upon by the physician assistant and the patient.  A telephone  was used for the visit.    1.  DIAGNOSTIC TESTS:  I reviewed the MRI lumbar spine.  No further diagnostic tests were ordered.  Paresthesias in the feet resolved so he does not need an EMG.    2.  PHYSICAL THERAPY: Patient completed 5 sessions of physical therapy in March 2022.  He does his home exercises.   No further physical therapy was ordered.    3.  MEDICATIONS:  No changes are made to the patient's medications.  Patient takes pregabalin 50 mg daily.  He takes Tylenol as needed.    4.  INTERVENTIONS: No further interventions were ordered.  -If leg symptoms return I would  recommend repeating the bilateral L5-S1 transforaminal epidural steroid injections.  - If axial low back pain worsens we could try L3, L4, L5 medial branch blocks as a work-up toward radiofrequency ablation.    - Bilateral L4-5 transforaminal epidural steroid injections September 21, 2022 provided 30% relief of his pain.    5.  PATIENT EDUCATION:  Patient is in agreement the above plan.  All questions were answered.    6.  FOLLOW-UP: Patient is going to follow-up with me as needed.  If he has questions or concerns, he should not hesitate to call.    Subjective:     Saw Sung Dubon is a 67 year old male who presents today for follow-up regarding low back pain and bilateral lower extremity pain with associated numbness, tingling, weakness.  Patient is following up after bilateral L5-S1 transforaminal epidural steroid injections October 26, 2022.  Patient reports 70 to 80% improvement in his symptoms.    Patient complains of mild residual midline low back pain.  He denies any pain radiating in the buttocks or down the legs.  He states the numbness, tingling, burning down the legs has resolved completely.  Denies weakness.  He rates his pain today as a 4 out of 10.  He denies any aggravating or alleviating factors for his pain.    Patient completed 5 sessions of physical therapy in March 2022.  He does his home exercises.  He states that he takes Tylenol as needed.  He takes pregabalin 50 mg daily.  Medications are helpful.    Review of Systems:  Positive for headache.  Negative for numbness/tingling, weakness, loss of bowel/bladder control, inability to urinate, pain much worse in neck, trip/stumble/falls, difficulty swallowing, difficulty with hand skills, fevers, unintentional weight loss.     Objective:   CONSTITUTIONAL:  Vital signs as above.  No acute distress.  The patient is well nourished and well groomed.    PSYCHIATRIC:  The patient is awake, alert, oriented to person, place and time.  The patient is answering  questions appropriately with clear speech.  Normal affect.  HEENT: Normocephalic, atraumatic.  Sclera clear.    SKIN:  Skin over the face, posterior torso, bilateral upper and lower extremities is clean, dry, intact without rashes.  VASCULAR: No significant lower extremity edema.  MUSCULOSKELETAL:  Gait is non-antalgic.   No tenderness palpation midline lumbar spine.  No tenderness palpation bilateral lumbar paraspinous muscles.   The patient has 5/5 strength for the bilateral hip flexors, knee flexors/extensors, ankle dorsiflexors/plantar flexors, ankle evertors/invertors.   NEUROLOGICAL:  Sensation to light touch is intact in the bilateral L4, L5, and S1 dermatomes.       RESULTS:  I reviewed the MRI lumbar spine from [facility] dated April 23, 2022.  At L4-5 there is a disc bulge with mild spinal canal stenosis and mild bilateral foraminal stenosis.  There is also mild bilateral foraminal stenosis L3-4.  Please report for further details.          Again, thank you for allowing me to participate in the care of your patient.        Sincerely,        Alison Santiago PA-C

## 2022-11-09 NOTE — PATIENT INSTRUCTIONS
I am so happy that you are feeling better!  If your pain returns we can repeat the injection(s).  You can have injections up to 4 times per year.    If your pain returns or if you have any other questions or concerns please send me a [employer] message or call our nurse line at [phone].

## 2022-11-29 NOTE — PROGRESS NOTES
[facility]    Outpatient Physical Therapy Discharge Note  Patient: Saw Sung Dubon  : 1955    Beginning/End Dates of Reporting Period:  2/15/22 to 3/29/22    Referring Provider: Kareem Sykes MD    Therapy Diagnosis: Acute on chronic bilateral lower back pain secondary to likely to discogenic pathology     Client Self Report: Patient reports the back pain is a little bit better but the burning in his feet is getting worse. The achy back pain was better after MT. Patient is taking gabapentin but has not noticed a change in burning sensation. He was also given a cream, but it was not helpful for his feet. Only cold water or ice helps.     Objective Measurements:  Objective Measure: pain   Details: 9/10    Goals:  Goal Identifier Self-management/HEP   Goal Description Patient will be independent in self-management of condition and HEP.   Target Date 22   Date Met      Progress (detail required for progress note):  Met     Goal Identifier Sleeping   Goal Description Patient will be able to sleep throughout the night without waking d/t pain for improved QOL.   Target Date 22   Date Met      Progress (detail required for progress note): Not Met     Goal Identifier Sitting   Goal Description Patient will be able to sit in the car or on a chair for 60 minutes for community mobility.   Target Date 22   Date Met      Progress (detail required for progress note): Not Met     Goal Identifier Walking   Goal Description Patient will be able to walk 30 minutes with <5/10 pain in the lower back for functional mobility.   Target Date 22   Date Met      Progress (detail required for progress note): Not Met     Plan:  Discharge from therapy.    Discharge:    Reason for Discharge: Patient chooses to discontinue therapy.  Patient no showed last 2 PT appointments and has not returned.    Equipment  Issued: Resistance band    Discharge Plan: Patient to continue home program.    Alice Lucia, PT, DPT, A  11/29/2022

## 2022-11-29 NOTE — ADDENDUM NOTE
Encounter addended by: Alice Lucia, PT on: 11/29/2022 9:02 AM   Actions taken: Clinical Note Signed, Episode resolved

## 2023-02-16 ENCOUNTER — LAB REQUISITION (OUTPATIENT)
Dept: LAB | Facility: CLINIC | Age: 68
End: 2023-02-16

## 2023-02-16 DIAGNOSIS — K21.9 GASTRO-ESOPHAGEAL REFLUX DISEASE WITHOUT ESOPHAGITIS: ICD-10-CM

## 2023-02-16 PROCEDURE — 87338 HPYLORI STOOL AG IA: CPT | Performed by: FAMILY MEDICINE

## 2023-02-17 LAB — H PYLORI AG STL QL IA: POSITIVE

## 2023-04-10 ENCOUNTER — LAB REQUISITION (OUTPATIENT)
Dept: LAB | Facility: CLINIC | Age: 68
End: 2023-04-10

## 2023-04-10 DIAGNOSIS — A04.8 OTHER SPECIFIED BACTERIAL INTESTINAL INFECTIONS: ICD-10-CM

## 2023-04-10 PROCEDURE — 87338 HPYLORI STOOL AG IA: CPT | Performed by: FAMILY MEDICINE

## 2023-04-11 LAB — H PYLORI AG STL QL IA: POSITIVE

## 2023-04-25 ENCOUNTER — MEDICAL CORRESPONDENCE (OUTPATIENT)
Dept: HEALTH INFORMATION MANAGEMENT | Facility: CLINIC | Age: 68
End: 2023-04-25
Payer: COMMERCIAL

## 2023-04-26 ENCOUNTER — TRANSFERRED RECORDS (OUTPATIENT)
Dept: HEALTH INFORMATION MANAGEMENT | Facility: CLINIC | Age: 68
End: 2023-04-26
Payer: COMMERCIAL

## 2023-04-26 ENCOUNTER — MEDICAL CORRESPONDENCE (OUTPATIENT)
Dept: HEALTH INFORMATION MANAGEMENT | Facility: CLINIC | Age: 68
End: 2023-04-26
Payer: COMMERCIAL

## 2023-04-26 ENCOUNTER — LAB REQUISITION (OUTPATIENT)
Dept: LAB | Facility: CLINIC | Age: 68
End: 2023-04-26

## 2023-04-26 DIAGNOSIS — R00.1 BRADYCARDIA, UNSPECIFIED: ICD-10-CM

## 2023-04-26 LAB
ALBUMIN SERPL BCG-MCNC: 4.1 G/DL (ref 3.5–5.2)
ALP SERPL-CCNC: 78 U/L (ref 40–129)
ALT SERPL W P-5'-P-CCNC: 42 U/L (ref 10–50)
ANION GAP SERPL CALCULATED.3IONS-SCNC: 11 MMOL/L (ref 7–15)
AST SERPL W P-5'-P-CCNC: 32 U/L (ref 10–50)
BILIRUB SERPL-MCNC: 0.7 MG/DL
BUN SERPL-MCNC: 9.1 MG/DL (ref 8–23)
CALCIUM SERPL-MCNC: 9.5 MG/DL (ref 8.8–10.2)
CHLORIDE SERPL-SCNC: 107 MMOL/L (ref 98–107)
CREAT SERPL-MCNC: 0.95 MG/DL (ref 0.67–1.17)
DEPRECATED HCO3 PLAS-SCNC: 26 MMOL/L (ref 22–29)
GFR SERPL CREATININE-BSD FRML MDRD: 87 ML/MIN/1.73M2
GLUCOSE SERPL-MCNC: 99 MG/DL (ref 70–99)
POTASSIUM SERPL-SCNC: 4.7 MMOL/L (ref 3.4–5.3)
PROT SERPL-MCNC: 7 G/DL (ref 6.4–8.3)
SODIUM SERPL-SCNC: 144 MMOL/L (ref 136–145)
TSH SERPL DL<=0.005 MIU/L-ACNC: 0.88 UIU/ML (ref 0.3–4.2)

## 2023-04-26 PROCEDURE — 80053 COMPREHEN METABOLIC PANEL: CPT | Performed by: FAMILY MEDICINE

## 2023-04-26 PROCEDURE — 84443 ASSAY THYROID STIM HORMONE: CPT | Performed by: FAMILY MEDICINE

## 2023-06-05 ENCOUNTER — OFFICE VISIT (OUTPATIENT)
Dept: CARDIOLOGY | Facility: CLINIC | Age: 68
End: 2023-06-05
Payer: COMMERCIAL

## 2023-06-05 VITALS
WEIGHT: 128.56 LBS | OXYGEN SATURATION: 96 % | BODY MASS INDEX: 25.53 KG/M2 | HEART RATE: 50 BPM | DIASTOLIC BLOOD PRESSURE: 54 MMHG | SYSTOLIC BLOOD PRESSURE: 96 MMHG | RESPIRATION RATE: 16 BRPM

## 2023-06-05 DIAGNOSIS — R00.1 SINUS BRADYCARDIA: Primary | ICD-10-CM

## 2023-06-05 DIAGNOSIS — R06.09 DYSPNEA ON EXERTION: ICD-10-CM

## 2023-06-05 PROCEDURE — 99204 OFFICE O/P NEW MOD 45 MIN: CPT | Performed by: INTERNAL MEDICINE

## 2023-06-05 NOTE — LETTER
6/5/2023    Emma Zavala MD  [address] [place] [id number]    RE: Saw Sung Dubon       Dear Colleague,     I had the pleasure of seeing Saw Sung Dubon in the [facility].    CARDIOLOGY CLINIC CONSULT NOTE     Assessment/Plan:   1.  Sinus bradycardia, heart rate in the upper 40s at rest.  TSH recently normal.  We will check 24-hour Holter monitor to make sure more excessive bradycardia is not present, or arrhythmias that warrant further intervention such as a pacemaker.  2.  Chest tightness with limited activities, no ischemic changes noted on recent ECG.  Will check stress echocardiogram to exclude ischemic coronary disease as a cause.  Patient agreeable, though reports he has never been on a treadmill before.  Resting echocardiographic images will help to exclude significant valvular disease, though none suggested by physical exam today.  3.  Paroxysmal nocturnal dyspnea, daytime sleepiness, loss of balance may reflect obstructive sleep apnea.  If stress test and Holter monitor are unrevealing, formal sleep apnea evaluation may be warranted.    Follow-up 3 months or as warranted pending test results.     History of Present Illness:     It is my pleasure to see Saw Sung Dubon at the [facility] clinic for evaluation of bradycardia.  He is seen today with the assistance of online Me[employer] .    Saw Sung Dubon is a 68 year old male with a past medical history of hyperlipidemia, gastroesophageal reflux disease, degenerative joint disease.  Over the last year or more he has had gradually worsening fatigue to the point where he now feels bad more often than he feels good.  The fatigue lasts all day and is worsened with activity.  It does improve somewhat with rest.  When he is more active, such as trying to take a long walk, he has experienced some tightness in his left chest.  There is no nausea vomiting or diaphoresis associated.    In addition he has had some sleepiness during the  daytime.  He tries to avoid napping but does so sometimes.  He awakens from his sleep short of breath when he is laying flat on his back and tries to avoid that.  His symptoms have been progressive over the last year.    He has had no syncope or palpitations.  He has had no peripheral edema.  No new medications.    Past Medical History:     Patient Active Problem List   Diagnosis    GERD (gastroesophageal reflux disease)    Back pain    Urinary frequency    Neuropathy    Lichen simplex chronicus    Beta thalassemia trait       Past Surgical History:   No past surgical history on file.    Family History:     Family History   Problem Relation Age of Onset    Diabetes No family hx of     Cancer No family hx of     Heart Disease No family hx of     Coronary Artery Disease No family hx of     Hypertension No family hx of     Hyperlipidemia No family hx of     Breast Cancer No family hx of     Cancer - colorectal No family hx of     Ovarian Cancer No family hx of     Prostate Cancer No family hx of     Other Cancer No family hx of     Mental Illness No family hx of     Cerebrovascular Disease No family hx of     Anesthesia Reaction No family hx of     Asthma No family hx of     Osteoporosis No family hx of     Known Genetic Syndrome No family hx of     Obesity No family hx of     Unknown/Adopted No family hx of      Family history reviewed and is not pertinent to the chief complaint or presenting problem    Social History:    reports that he has never smoked. He has never used smokeless tobacco. He reports that he does not drink alcohol and does not use drugs.    Exercise: Walks regularly, but feels fatigue with long walks and so has cut back on his activities.    Sleep: Occasionally awakens short of breath when sleeping on his back.  No  apnea reported.  Wife occasionally states he snores    Meds:     Current Outpatient Medications   Medication Sig Dispense Refill    aspirin (ASPIRIN LOW DOSE) 81 MG EC tablet Take 1  tablet (81 mg) by mouth daily 90 tablet 3    atorvastatin (LIPITOR) 20 MG tablet Take 1 tablet (20 mg) by mouth daily 90 tablet 3    Ivan Protect (EUCERIN) external cream Apply topically 3 times daily 142 g 3    Calamine external lotion Apply to bilateral arms and legs three times daily 180 mL 1    calcium carbonate (TUMS) 500 MG chewable tablet Take 1 tablet (500 mg) by mouth 3 times daily as needed for heartburn 90 tablet 3    capsaicin (ZOSTRIX) 0.025 % external cream Apply 1 g topically 3 times daily as needed (back pain) 120 g 1    cetirizine (ZYRTEC) 10 MG tablet Take 2 tablets (20 mg) by mouth daily 30 tablet 1    clobetasol (TEMOVATE) 0.05 % external cream APPLY TOPICALLY TO BACK OF NECK TWICE DAILY AS NEEDED FOR ITCHING FOR UP TO 14 DAYS      diclofenac (VOLTAREN) 1 % topical gel Apply 4 g topically 4 times daily 50 g 0    famotidine (PEPCID) 20 MG tablet Take 1 tablet (20 mg) by mouth 2 times daily 60 tablet 3    gabapentin (NEURONTIN) 300 MG capsule Take 1 capsule (300 mg) by mouth 2 times daily 60 capsule 1    hydrocortisone (WESTCORT) 0.2 % external cream Apply topically 2 times daily Apply to bites on arms and legs bilaterally 45 g 1    permethrin (ELIMITE) 5 % external cream Apply cream from head to toe (except the face); leave on for 8-14 hours then wash off with water; reapply in 1 week if live mites appear. 60 g 1    pregabalin (LYRICA) 50 MG capsule       Skin Protectants, Misc. (EUCERIN) cream Apply topically 3 times daily 454 g 3    acetaminophen (TYLENOL) 325 MG tablet Take 1-2 tablets (325-650 mg) by mouth every 6 hours as needed for mild pain (Patient not taking: Reported on 6/5/2023) 100 tablet 1       Allergies:   Nkda [no known drug allergy]    Review of Systems:     Positive for hearing loss, weight loss, arm pain, shortness of breath, heartburn, joint pains, muscle weakness, nocturia, loss of balance, daytime sleepiness.  12 point review of systems otherwise negative     Please refer  above for cardiac ROS details.       Objective:      Physical Exam  58.3 kg (128 lb 9 oz)     Body mass index is 25.53 kg/m .  BP 96/54 (BP Location: Right arm, Patient Position: Sitting, Cuff Size: Adult Regular)   Pulse 50   Resp 16   Wt 58.3 kg (128 lb 9 oz)   SpO2 96%   BMI 25.53 kg/m          General Appearance : Awake, Alert, No acute distress  HEENT: No Scleral icterus; the mucous membranes were pink and moist.  Conjunctivae not injected  Neck:  No cervical bruits, jugular venous distention, or thyromegaly   Chest: The spine was straight. Chest wall symmetric  Lungs: Respirations unlabored; the lungs are clear to auscultation.  No wheezing   Cardiovascular:   Normal point of maximal impulse.  Auscultation reveals regular, slow first and second heart sounds with no murmurs, rubs, or gallops.  Carotid, radial, and dorsalis pedal pulses are intact and symmetric.    Abdomen: No organomegaly, masses, bruits, or tenderness. Bowels sounds are present  Extremities: No edema  Skin: No xanthelasma. Warm, Dry.  Musculoskeletal: No tenderness.  Neurologic: Alert and oriented ×3. Speech is fluent.      EKG (personally reviewed):  At [facility] 4/26/2023: Sinus bradycardia 49 bpm.    Cardiac Imaging Studies:      Lab Review   Lab Results   Component Value Date     04/26/2023     05/17/2021    CO2 26 04/26/2023    CO2 25.7 07/03/2019    BUN 9.1 04/26/2023    BUN 13 05/17/2021     Lab Results   Component Value Date    HGB 12.2 05/28/2021    HCT 40.1 05/28/2021    MCV 63 05/28/2021     Lab Results   Component Value Date    CHOL 123 08/10/2022    CHOL 171 05/17/2021    TRIG 120 08/10/2022    TRIG 144.4 07/03/2019    HDL 44 08/10/2022    HDL 40 05/17/2021     No results found for: TROPONINI  No results found for: BNP  Lab Results   Component Value Date    TSH 0.88 04/26/2023       Jamie Campuzano MD, MD FACC      This note created using Dragon voice recognition software. Sound alike errors may have escaped  editing.       Thank you for allowing me to participate in the care of your patient.      Sincerely,     Jamie Campuzano MD     [facility]  cc:   No referring provider defined for this encounter.

## 2023-06-05 NOTE — PROGRESS NOTES
CARDIOLOGY CLINIC CONSULT NOTE     Assessment/Plan:   1.  Sinus bradycardia, heart rate in the upper 40s at rest.  TSH recently normal.  We will check 24-hour Holter monitor to make sure more excessive bradycardia is not present, or arrhythmias that warrant further intervention such as a pacemaker.  2.  Chest tightness with limited activities, no ischemic changes noted on recent ECG.  Will check stress echocardiogram to exclude ischemic coronary disease as a cause.  Patient agreeable, though reports he has never been on a treadmill before.  Resting echocardiographic images will help to exclude significant valvular disease, though none suggested by physical exam today.  3.  Paroxysmal nocturnal dyspnea, daytime sleepiness, loss of balance may reflect obstructive sleep apnea.  If stress test and Holter monitor are unrevealing, formal sleep apnea evaluation may be warranted.    Follow-up 3 months or as warranted pending test results.     History of Present Illness:     It is my pleasure to see Saw uSng Dubon at the [facility] for evaluation of bradycardia.  He is seen today with the assistance of online Meaghan .    Saw Sung Dubon is a 68 year old male with a past medical history of hyperlipidemia, gastroesophageal reflux disease, degenerative joint disease.  Over the last year or more he has had gradually worsening fatigue to the point where he now feels bad more often than he feels good.  The fatigue lasts all day and is worsened with activity.  It does improve somewhat with rest.  When he is more active, such as trying to take a long walk, he has experienced some tightness in his left chest.  There is no nausea vomiting or diaphoresis associated.    In addition he has had some sleepiness during the daytime.  He tries to avoid napping but does so sometimes.  He awakens from his sleep short of breath when he is laying flat on his back and tries to avoid that.  His symptoms have been  progressive over the last year.    He has had no syncope or palpitations.  He has had no peripheral edema.  No new medications.    Past Medical History:     Patient Active Problem List   Diagnosis     GERD (gastroesophageal reflux disease)     Back pain     Urinary frequency     Neuropathy     Lichen simplex chronicus     Beta thalassemia trait       Past Surgical History:   No past surgical history on file.    Family History:     Family History   Problem Relation Age of Onset     Diabetes No family hx of      Cancer No family hx of      Heart Disease No family hx of      Coronary Artery Disease No family hx of      Hypertension No family hx of      Hyperlipidemia No family hx of      Breast Cancer No family hx of      Cancer - colorectal No family hx of      Ovarian Cancer No family hx of      Prostate Cancer No family hx of      Other Cancer No family hx of      Mental Illness No family hx of      Cerebrovascular Disease No family hx of      Anesthesia Reaction No family hx of      Asthma No family hx of      Osteoporosis No family hx of      Known Genetic Syndrome No family hx of      Obesity No family hx of      Unknown/Adopted No family hx of      Family history reviewed and is not pertinent to the chief complaint or presenting problem    Social History:    reports that he has never smoked. He has never used smokeless tobacco. He reports that he does not drink alcohol and does not use drugs.    Exercise: Walks regularly, but feels fatigue with long walks and so has cut back on his activities.    Sleep: Occasionally awakens short of breath when sleeping on his back.  No  apnea reported.  Wife occasionally states he snores    Meds:     Current Outpatient Medications   Medication Sig Dispense Refill     aspirin (ASPIRIN LOW DOSE) 81 MG EC tablet Take 1 tablet (81 mg) by mouth daily 90 tablet 3     atorvastatin (LIPITOR) 20 MG tablet Take 1 tablet (20 mg) by mouth daily 90 tablet 3     Ivan Protect (EUCERIN)  external cream Apply topically 3 times daily 142 g 3     Calamine external lotion Apply to bilateral arms and legs three times daily 180 mL 1     calcium carbonate (TUMS) 500 MG chewable tablet Take 1 tablet (500 mg) by mouth 3 times daily as needed for heartburn 90 tablet 3     capsaicin (ZOSTRIX) 0.025 % external cream Apply 1 g topically 3 times daily as needed (back pain) 120 g 1     cetirizine (ZYRTEC) 10 MG tablet Take 2 tablets (20 mg) by mouth daily 30 tablet 1     clobetasol (TEMOVATE) 0.05 % external cream APPLY TOPICALLY TO BACK OF NECK TWICE DAILY AS NEEDED FOR ITCHING FOR UP TO 14 DAYS       diclofenac (VOLTAREN) 1 % topical gel Apply 4 g topically 4 times daily 50 g 0     famotidine (PEPCID) 20 MG tablet Take 1 tablet (20 mg) by mouth 2 times daily 60 tablet 3     gabapentin (NEURONTIN) 300 MG capsule Take 1 capsule (300 mg) by mouth 2 times daily 60 capsule 1     hydrocortisone (WESTCORT) 0.2 % external cream Apply topically 2 times daily Apply to bites on arms and legs bilaterally 45 g 1     permethrin (ELIMITE) 5 % external cream Apply cream from head to toe (except the face); leave on for 8-14 hours then wash off with water; reapply in 1 week if live mites appear. 60 g 1     pregabalin (LYRICA) 50 MG capsule        Skin Protectants, Misc. (EUCERIN) cream Apply topically 3 times daily 454 g 3     acetaminophen (TYLENOL) 325 MG tablet Take 1-2 tablets (325-650 mg) by mouth every 6 hours as needed for mild pain (Patient not taking: Reported on 6/5/2023) 100 tablet 1       Allergies:   Nkda [no known drug allergy]    Review of Systems:     Positive for hearing loss, weight loss, arm pain, shortness of breath, heartburn, joint pains, muscle weakness, nocturia, loss of balance, daytime sleepiness.  12 point review of systems otherwise negative     Please refer above for cardiac ROS details.       Objective:      Physical Exam  58.3 kg (128 lb 9 oz)     Body mass index is 25.53 kg/m .  BP 96/54 (BP  Location: Right arm, Patient Position: Sitting, Cuff Size: Adult Regular)   Pulse 50   Resp 16   Wt 58.3 kg (128 lb 9 oz)   SpO2 96%   BMI 25.53 kg/m          General Appearance : Awake, Alert, No acute distress  HEENT: No Scleral icterus; the mucous membranes were pink and moist.  Conjunctivae not injected  Neck:  No cervical bruits, jugular venous distention, or thyromegaly   Chest: The spine was straight. Chest wall symmetric  Lungs: Respirations unlabored; the lungs are clear to auscultation.  No wheezing   Cardiovascular:   Normal point of maximal impulse.  Auscultation reveals regular, slow first and second heart sounds with no murmurs, rubs, or gallops.  Carotid, radial, and dorsalis pedal pulses are intact and symmetric.    Abdomen: No organomegaly, masses, bruits, or tenderness. Bowels sounds are present  Extremities: No edema  Skin: No xanthelasma. Warm, Dry.  Musculoskeletal: No tenderness.  Neurologic: Alert and oriented ×3. Speech is fluent.      EKG (personally reviewed):  At [facility] 4/26/2023: Sinus bradycardia 49 bpm.    Cardiac Imaging Studies:      Lab Review   Lab Results   Component Value Date     04/26/2023     05/17/2021    CO2 26 04/26/2023    CO2 25.7 07/03/2019    BUN 9.1 04/26/2023    BUN 13 05/17/2021     Lab Results   Component Value Date    HGB 12.2 05/28/2021    HCT 40.1 05/28/2021    MCV 63 05/28/2021     Lab Results   Component Value Date    CHOL 123 08/10/2022    CHOL 171 05/17/2021    TRIG 120 08/10/2022    TRIG 144.4 07/03/2019    HDL 44 08/10/2022    HDL 40 05/17/2021     No results found for: TROPONINI  No results found for: BNP  Lab Results   Component Value Date    TSH 0.88 04/26/2023       Jamie Campuzano MD, MD FACC      This note created using Dragon voice recognition software. Sound alike errors may have escaped editing.

## 2023-06-05 NOTE — PATIENT INSTRUCTIONS
We will schedule a 24-hour monitor to see whether there are any concerning rhythms that might require pacemaker placement or other treatment  We will schedule a stress echocardiogram to look for evidence of heart artery blockages causing your fatigue, shortness of breath, and chest tightness.  Consider sleep apnea evaluation if these tests showed no abnormalities.

## 2023-06-07 ENCOUNTER — LAB REQUISITION (OUTPATIENT)
Dept: LAB | Facility: CLINIC | Age: 68
End: 2023-06-07

## 2023-06-07 DIAGNOSIS — D72.829 ELEVATED WHITE BLOOD CELL COUNT, UNSPECIFIED: ICD-10-CM

## 2023-06-07 DIAGNOSIS — R10.13 EPIGASTRIC PAIN: ICD-10-CM

## 2023-06-07 LAB
BASOPHILS # BLD AUTO: 0.4 10E3/UL (ref 0–0.2)
BASOPHILS NFR BLD AUTO: 3 %
EOSINOPHIL # BLD AUTO: 0.8 10E3/UL (ref 0–0.7)
EOSINOPHIL NFR BLD AUTO: 5 %
ERYTHROCYTE [DISTWIDTH] IN BLOOD BY AUTOMATED COUNT: 19.1 % (ref 10–15)
HCT VFR BLD AUTO: 41.2 % (ref 40–53)
HGB BLD-MCNC: 12.5 G/DL (ref 13.3–17.7)
IMM GRANULOCYTES # BLD: 0.1 10E3/UL
IMM GRANULOCYTES NFR BLD: 1 %
LYMPHOCYTES # BLD AUTO: 2.4 10E3/UL (ref 0.8–5.3)
LYMPHOCYTES NFR BLD AUTO: 16 %
MCH RBC QN AUTO: 19.3 PG (ref 26.5–33)
MCHC RBC AUTO-ENTMCNC: 30.3 G/DL (ref 31.5–36.5)
MCV RBC AUTO: 64 FL (ref 78–100)
MONOCYTES # BLD AUTO: 0.6 10E3/UL (ref 0–1.3)
MONOCYTES NFR BLD AUTO: 4 %
NEUTROPHILS # BLD AUTO: 10.8 10E3/UL (ref 1.6–8.3)
NEUTROPHILS NFR BLD AUTO: 71 %
NRBC # BLD AUTO: 0 10E3/UL
NRBC BLD AUTO-RTO: 0 /100
PLAT MORPH BLD: ABNORMAL
PLATELET # BLD AUTO: 522 10E3/UL (ref 150–450)
POLYCHROMASIA BLD QL SMEAR: SLIGHT
RBC # BLD AUTO: 6.49 10E6/UL (ref 4.4–5.9)
RBC MORPH BLD: ABNORMAL
RETICS # AUTO: 0.15 10E6/UL (ref 0.03–0.1)
RETICS/RBC NFR AUTO: 2.3 % (ref 0.5–2)
TARGETS BLD QL SMEAR: SLIGHT
WBC # BLD AUTO: 15.1 10E3/UL (ref 4–11)

## 2023-06-07 PROCEDURE — 85004 AUTOMATED DIFF WBC COUNT: CPT | Performed by: FAMILY MEDICINE

## 2023-06-07 PROCEDURE — 85060 BLOOD SMEAR INTERPRETATION: CPT | Performed by: PATHOLOGY

## 2023-06-07 PROCEDURE — 85045 AUTOMATED RETICULOCYTE COUNT: CPT | Performed by: FAMILY MEDICINE

## 2023-06-12 LAB
PATH REPORT.ADDENDUM SPEC: NORMAL
PATH REPORT.COMMENTS IMP SPEC: NORMAL
PATH REPORT.COMMENTS IMP SPEC: NORMAL
PATH REPORT.FINAL DX SPEC: NORMAL
PATH REPORT.RELEVANT HX SPEC: NORMAL

## 2023-06-26 ENCOUNTER — HOSPITAL ENCOUNTER (OUTPATIENT)
Dept: CARDIOLOGY | Facility: CLINIC | Age: 68
Discharge: HOME | End: 2023-06-26
Attending: INTERNAL MEDICINE
Payer: COMMERCIAL

## 2023-06-26 DIAGNOSIS — R00.1 SINUS BRADYCARDIA: ICD-10-CM

## 2023-06-26 DIAGNOSIS — R06.09 DYSPNEA ON EXERTION: ICD-10-CM

## 2023-06-26 LAB — LVEF ECHO: NORMAL

## 2023-06-26 PROCEDURE — 93306 TTE W/DOPPLER COMPLETE: CPT | Mod: 26 | Performed by: INTERNAL MEDICINE

## 2023-06-26 PROCEDURE — 93226 XTRNL ECG REC<48 HR SCAN A/R: CPT

## 2023-06-26 PROCEDURE — 93306 TTE W/DOPPLER COMPLETE: CPT

## 2023-06-27 DIAGNOSIS — R06.09 DYSPNEA ON EXERTION: ICD-10-CM

## 2023-06-27 DIAGNOSIS — R00.1 SINUS BRADYCARDIA: Primary | ICD-10-CM

## 2023-06-27 PROCEDURE — 93227 XTRNL ECG REC<48 HR R&I: CPT | Performed by: INTERNAL MEDICINE

## 2023-07-10 ENCOUNTER — HOSPITAL ENCOUNTER (OUTPATIENT)
Dept: NUCLEAR MEDICINE | Facility: CLINIC | Age: 68
Discharge: HOME | End: 2023-07-10
Attending: INTERNAL MEDICINE
Payer: COMMERCIAL

## 2023-07-10 DIAGNOSIS — R00.1 SINUS BRADYCARDIA: ICD-10-CM

## 2023-07-10 DIAGNOSIS — R06.09 DYSPNEA ON EXERTION: ICD-10-CM

## 2023-07-10 LAB
CV BLOOD PRESSURE: 62 MMHG
CV STRESS CURRENT BP HE: NORMAL
CV STRESS CURRENT HR HE: 47
CV STRESS CURRENT HR HE: 48
CV STRESS CURRENT HR HE: 56
CV STRESS CURRENT HR HE: 57
CV STRESS CURRENT HR HE: 59
CV STRESS CURRENT HR HE: 59
CV STRESS CURRENT HR HE: 61
CV STRESS CURRENT HR HE: 65
CV STRESS CURRENT HR HE: 68
CV STRESS CURRENT HR HE: 73
CV STRESS CURRENT HR HE: 73
CV STRESS CURRENT HR HE: 77
CV STRESS CURRENT HR HE: 77
CV STRESS CURRENT HR HE: 78
CV STRESS CURRENT HR HE: 78
CV STRESS DEVIATION TIME HE: NORMAL
CV STRESS ECHO PERCENT HR HE: NORMAL
CV STRESS EXERCISE STAGE HE: NORMAL
CV STRESS FINAL RESTING BP HE: NORMAL
CV STRESS FINAL RESTING HR HE: 56
CV STRESS MAX HR HE: 79
CV STRESS MAX TREADMILL GRADE HE: 0
CV STRESS MAX TREADMILL SPEED HE: 0
CV STRESS PEAK DIA BP HE: NORMAL
CV STRESS PEAK SYS BP HE: NORMAL
CV STRESS PHASE HE: NORMAL
CV STRESS PROTOCOL HE: NORMAL
CV STRESS RESTING PT POSITION HE: NORMAL
CV STRESS ST DEVIATION AMOUNT HE: NORMAL
CV STRESS ST DEVIATION ELEVATION HE: NORMAL
CV STRESS ST EVELATION AMOUNT HE: NORMAL
CV STRESS TEST TYPE HE: NORMAL
CV STRESS TOTAL STAGE TIME MIN 1 HE: NORMAL
RATE PRESSURE PRODUCT: 9322
STRESS ECHO BASELINE HR: 47
STRESS ECHO CALCULATED PERCENT HR: 52 %
STRESS ECHO LAST STRESS DIASTOLIC BP: 64
STRESS ECHO LAST STRESS HR: 73
STRESS ECHO LAST STRESS SYSTOLIC BP: 118
STRESS ECHO TARGET HR: 152

## 2023-07-10 PROCEDURE — 78452 HT MUSCLE IMAGE SPECT MULT: CPT

## 2023-07-10 PROCEDURE — 93017 CV STRESS TEST TRACING ONLY: CPT

## 2023-07-10 PROCEDURE — 343N000001 HC RX 343: Performed by: INTERNAL MEDICINE

## 2023-07-10 PROCEDURE — A9500 TC99M SESTAMIBI: HCPCS | Performed by: INTERNAL MEDICINE

## 2023-07-10 PROCEDURE — 93018 CV STRESS TEST I&R ONLY: CPT | Performed by: INTERNAL MEDICINE

## 2023-07-10 PROCEDURE — 78452 HT MUSCLE IMAGE SPECT MULT: CPT | Mod: 26 | Performed by: INTERNAL MEDICINE

## 2023-07-10 RX ADMIN — Medication 32.2 MILLICURIE: at 09:00

## 2023-07-10 RX ADMIN — Medication 8.06 MILLICURIE: at 08:24

## 2023-07-25 ENCOUNTER — TRANSFERRED RECORDS (OUTPATIENT)
Dept: HEALTH INFORMATION MANAGEMENT | Facility: CLINIC | Age: 68
End: 2023-07-25
Payer: COMMERCIAL

## 2023-07-25 ENCOUNTER — MEDICAL CORRESPONDENCE (OUTPATIENT)
Dept: HEALTH INFORMATION MANAGEMENT | Facility: CLINIC | Age: 68
End: 2023-07-25
Payer: COMMERCIAL

## 2023-08-05 ENCOUNTER — HOSPITAL ENCOUNTER (EMERGENCY)
Facility: CLINIC | Age: 68
Discharge: HOME | End: 2023-08-05
Admitting: EMERGENCY MEDICINE
Payer: COMMERCIAL

## 2023-08-05 ENCOUNTER — APPOINTMENT (OUTPATIENT)
Dept: RADIOLOGY | Facility: CLINIC | Age: 68
End: 2023-08-05
Attending: EMERGENCY MEDICINE
Payer: COMMERCIAL

## 2023-08-05 VITALS
RESPIRATION RATE: 16 BRPM | HEART RATE: 54 BPM | BODY MASS INDEX: 25.13 KG/M2 | WEIGHT: 128 LBS | DIASTOLIC BLOOD PRESSURE: 79 MMHG | TEMPERATURE: 97.6 F | HEIGHT: 60 IN | OXYGEN SATURATION: 97 % | SYSTOLIC BLOOD PRESSURE: 142 MMHG

## 2023-08-05 DIAGNOSIS — M25.572 ACUTE LEFT ANKLE PAIN: ICD-10-CM

## 2023-08-05 DIAGNOSIS — S99.912A ANKLE INJURY, LEFT, INITIAL ENCOUNTER: ICD-10-CM

## 2023-08-05 PROCEDURE — 73630 X-RAY EXAM OF FOOT: CPT | Mod: LT

## 2023-08-05 PROCEDURE — 73610 X-RAY EXAM OF ANKLE: CPT | Mod: LT

## 2023-08-05 PROCEDURE — 250N000013 HC RX MED GY IP 250 OP 250 PS 637: Performed by: EMERGENCY MEDICINE

## 2023-08-05 PROCEDURE — 99284 EMERGENCY DEPT VISIT MOD MDM: CPT | Mod: 25

## 2023-08-05 PROCEDURE — 29515 APPLICATION SHORT LEG SPLINT: CPT | Mod: LT

## 2023-08-05 RX ORDER — ACETAMINOPHEN 325 MG/1
975 TABLET ORAL ONCE
Status: COMPLETED | OUTPATIENT
Start: 2023-08-05 | End: 2023-08-05

## 2023-08-05 RX ADMIN — ACETAMINOPHEN 975 MG: 325 TABLET ORAL at 23:18

## 2023-08-05 ASSESSMENT — ACTIVITIES OF DAILY LIVING (ADL): ADLS_ACUITY_SCORE: 33

## 2023-08-06 NOTE — ED PROVIDER NOTES
EMERGENCY DEPARTMENT ENCOUNTER      NAME: Manpreet Dubon  AGE: 68 year old male  YOB: 1955  MRN: [id number]  EVALUATION DATE & TIME: 8/5/2023 10:20 PM    PCP: Emma Zavala    ED PROVIDER: Gail Gutierrez PA-C      Chief Complaint   Patient presents with    Leg Injury         FINAL IMPRESSION:  1. Ankle injury, left, initial encounter    2. Acute left ankle pain      MEDICAL DECISION MAKING:    Pertinent Labs & Imaging studies reviewed. (See chart for details)  68 year old male presents to the Emergency Department for evaluation of ankle pain.  The patient was in his garden earlier today when he tripped, fell and twisted his left ankle.  Since then he has had significant pain and difficulty walking on it due to pain and was concerned about possible fracture so he presents to the emergency department.  On my evaluation, the patient was hypertensive at 142/79 but otherwise vitally stable.  Examination with 2+ DP pulses.  Significant swelling to the left ankle with diffuse tenderness to palpation.  Normal range of motion of the left ankle.  Distal CMS intact.  No tenderness to palpation of the knee or hip on the left.  Patient able to bear weight on ankle.  Differential diagnosis included fracture, dislocation, sprain.    Patient had some tenderness to palpation of the foot up into the lower tibia/fibula however, did not have any tenderness to palpation of the head of the fibula or tibial plateau.  I did order x-rays of the left foot and ankle.  These were independently interpreted by myself and did not show any obvious fracture or dislocation.  Formal read without any fracture in the ankle or foot.  Patient was given a dose of Tylenol with improvement of symptoms.  He was given a walking boot and was able to bear weight on the ankle without significant pain.  To help with ambulation I did give him a walker as well.  Ultimately, I did recommend close follow-up with orthopedics due to his symptoms for  continued management and care.  I recommended rest, ice, compression and elevation with pain management with Tylenol and ibuprofen.  We discussed signs and symptoms to return to the emergency department and again close follow-up with orthopedics.  He should call [place] on Monday to get appointment scheduled this was discussed with him.  All questions were answered to the best my ability and he was discharged in stable condition.    Medical Decision Making    History:  Supplemental history from: Documented in chart, if applicable  External Record(s) reviewed: Documented in chart, if applicable.    Work Up:  Chart documentation includes differential considered and any EKGs or imaging independently interpreted by provider, where specified.  In additional to work up documented, I considered the following work up: Documented in chart, if applicable.    External consultation:  Discussion of management with another provider: Documented in chart, if applicable    Complicating factors:  Care impacted by chronic illness: N/A  Care affected by social determinants of health: N/A    Disposition considerations: Discharge. No recommendations on prescription strength medication(s). See documentation for any additional details.         ED COURSE:  10:29 PM I met with the patient, obtained history, performed an initial exam, and discussed options and plan for diagnostics and treatment here in the ED.    11:45 PM Patient discharged after being provided with extensive anticipatory guidance and given return precautions, importance of PCP follow-up emphasized.    At the conclusion of the encounter I discussed the results of all of the tests and the disposition. The questions were answered. The patient or family acknowledged understanding and was agreeable with the care plan.     MEDICATIONS GIVEN IN THE EMERGENCY:  Medications   acetaminophen (TYLENOL) tablet 975 mg (975 mg Oral $Given 8/5/23 6339)       NEW PRESCRIPTIONS STARTED AT  TODAY'S ER VISIT  Discharge Medication List as of 8/5/2023 11:47 PM               =================================================================    HPI:    Patient information was obtained from: patient    Use of Interpretor: Yes (Phone) - Language Meaghan Dubon is a 68 year old male with a pertinent history of gastroesophageal reflux disease who presents to this ED via walk-in for evaluation of leg injury.    Earlier today, patient had a misstep and injured his left ankle, now finding himself unable to walk on it due to the pain. He reports no pain in his left knee or hip and his entire right lower extremity is uninjured.  He did fall but he did not hit his head.  He denies any other injuries.  He denies any numbness or tingling.  He denies any weakness in the ankle, just pain.  He's taken no medication for the pain and is otherwise at a normal baseline of health.      REVIEW OF SYSTEMS:  Negative unless otherwise stated in the above HPI.       PAST MEDICAL HISTORY:  Past Medical History:   Diagnosis Date    Anemia        PAST SURGICAL HISTORY:  History reviewed. No pertinent surgical history.        CURRENT MEDICATIONS:    No current facility-administered medications for this encounter.    Current Outpatient Medications:     acetaminophen (TYLENOL) 325 MG tablet, Take 1-2 tablets (325-650 mg) by mouth every 6 hours as needed for mild pain (Patient not taking: Reported on 6/5/2023), Disp: 100 tablet, Rfl: 1    aspirin (ASPIRIN LOW DOSE) 81 MG EC tablet, Take 1 tablet (81 mg) by mouth daily, Disp: 90 tablet, Rfl: 3    atorvastatin (LIPITOR) 20 MG tablet, Take 1 tablet (20 mg) by mouth daily, Disp: 90 tablet, Rfl: 3    Ivan Protect (EUCERIN) external cream, Apply topically 3 times daily, Disp: 142 g, Rfl: 3    Calamine external lotion, Apply to bilateral arms and legs three times daily, Disp: 180 mL, Rfl: 1    calcium carbonate (TUMS) 500 MG chewable tablet, Take 1 tablet (500 mg) by mouth 3 times daily  as needed for heartburn, Disp: 90 tablet, Rfl: 3    capsaicin (ZOSTRIX) 0.025 % external cream, Apply 1 g topically 3 times daily as needed (back pain), Disp: 120 g, Rfl: 1    cetirizine (ZYRTEC) 10 MG tablet, Take 2 tablets (20 mg) by mouth daily, Disp: 30 tablet, Rfl: 1    clobetasol (TEMOVATE) 0.05 % external cream, APPLY TOPICALLY TO BACK OF NECK TWICE DAILY AS NEEDED FOR ITCHING FOR UP TO 14 DAYS, Disp: , Rfl:     diclofenac (VOLTAREN) 1 % topical gel, Apply 4 g topically 4 times daily, Disp: 50 g, Rfl: 0    famotidine (PEPCID) 20 MG tablet, Take 1 tablet (20 mg) by mouth 2 times daily, Disp: 60 tablet, Rfl: 3    gabapentin (NEURONTIN) 300 MG capsule, Take 1 capsule (300 mg) by mouth 2 times daily, Disp: 60 capsule, Rfl: 1    hydrocortisone (WESTCORT) 0.2 % external cream, Apply topically 2 times daily Apply to bites on arms and legs bilaterally, Disp: 45 g, Rfl: 1    permethrin (ELIMITE) 5 % external cream, Apply cream from head to toe (except the face); leave on for 8-14 hours then wash off with water; reapply in 1 week if live mites appear., Disp: 60 g, Rfl: 1    pregabalin (LYRICA) 50 MG capsule, , Disp: , Rfl:     Skin Protectants, Misc. (EUCERIN) cream, Apply topically 3 times daily, Disp: 454 g, Rfl: 3      ALLERGIES:  Allergies   Allergen Reactions    Nkda [No Known Drug Allergy]        FAMILY HISTORY:  Family History   Problem Relation Age of Onset    Diabetes No family hx of     Cancer No family hx of     Heart Disease No family hx of     Coronary Artery Disease No family hx of     Hypertension No family hx of     Hyperlipidemia No family hx of     Breast Cancer No family hx of     Cancer - colorectal No family hx of     Ovarian Cancer No family hx of     Prostate Cancer No family hx of     Other Cancer No family hx of     Mental Illness No family hx of     Cerebrovascular Disease No family hx of     Anesthesia Reaction No family hx of     Asthma No family hx of     Osteoporosis No family hx of      Known Genetic Syndrome No family hx of     Obesity No family hx of     Unknown/Adopted No family hx of        SOCIAL HISTORY:   Social History     Socioeconomic History    Marital status:    Tobacco Use    Smoking status: Never    Smokeless tobacco: Never   Vaping Use    Vaping Use: Never used   Substance and Sexual Activity    Alcohol use: No    Drug use: No       VITALS:  Patient Vitals for the past 24 hrs:   BP Temp Temp src Pulse Resp SpO2 Height Weight   08/05/23 2216 (!) 142/79 97.6  F (36.4  C) Temporal 54 16 97 % 1.524 m (5') 58.1 kg (128 lb)       PHYSICAL EXAM   Constitutional: Well developed, Well nourished, NAD  HENT: Normocephalic, Atraumatic, Bilateral external ears normal, Oropharynx normal, mucous membranes moist, Nose normal.   Neck: Normal range of motion, No tenderness, Supple, No stridor.  Eyes: PERRL, EOMI, Conjunctiva normal, No discharge.   Respiratory: Speaks full sentences easily. No cough.  Cardiovascular: Heart rate and blood pressure as above.  Musculoskeletal: 2+ DP pulses.  Significant swelling to the left ankle with diffuse tenderness to palpation.  Normal range of motion of the left ankle.  Distal CMS intact.  No tenderness to palpation of the knee or hip on the left.  Patient able to bear weight on ankle. Good range of motion in all major joints. No tenderness to palpation or major deformities noted.   Integument: Warm, Dry, No erythema, No rash. No petechiae.  Neurologic: GCS 15.  Alert & oriented x 3, Normal motor function, Normal sensory function, No focal deficits noted. Normal gait.  Psychiatric: Affect normal, Judgment normal, Mood normal. Cooperative.    LAB:  All pertinent labs reviewed and interpreted.  No results found for this or any previous visit (from the past 24 hour(s)).      RADIOLOGY:  Reviewed all pertinent imaging. Please see official radiology report.  XR Ankle Left G/E 3 Views   Final Result   IMPRESSION: Anatomic alignment of the left ankle. No fracture  or dislocation. Ankle mortise is symmetric. Talar dome is smooth. Mild soft tissue swelling left leg and about the left ankle. Overlying bandage material.      Foot XR, G/E 3 views, left   Final Result   IMPRESSION: No acute bony finding such as fracture dislocation. Normal variant accessory ossicle in the mid foot laterally.          I, Polo Odom, am serving as a scribe to document services personally performed by Gail Gutierrez PA-C based on my observation and the provider's statements to me. I, Gail Gutierrez PA-C attest that Polo Odom is acting in a scribe capacity, has observed my performance of the services and has documented them in accordance with my direction.    Gail Gutierrez PA-C  Emergency Medicine  [facility]  8/5/2023       Gail Gutierrez PA-C  08/06/23 0129

## 2023-08-06 NOTE — DISCHARGE INSTRUCTIONS
You were seen and evaluated here in the emergency department after twisting your left ankle.  Fortunately, there is no fracture in the ankle or foot.  You had improvement of pain with Tylenol here and further improvement after given a walking boot and a walker.  I recommend weightbearing as tolerated and close follow-up with orthopedics.  You should call [facility] on Monday to schedule follow-up appointment within the week.    I recommend taking Tylenol 1000 mg and ibuprofen 600 mg every 6 hours as needed for pain.  I recommend rest, ice, compression and elevation.    If you develop severe worsening symptoms, reinjury to the area, loss of pulses, loss of sensation or other concerns you should return to the emergency department.  Otherwise, follow-up with [facility] for recheck next week.

## 2023-08-06 NOTE — ED TRIAGE NOTES
Pt was gardening earlier today, stepped on a stick, twisted LLE and has pain and difficulty bearing weight and moving distal part of LLE.      Triage Assessment       Row Name 08/05/23 4293       Triage Assessment (Adult)    Airway WDL WDL       Respiratory WDL    Respiratory WDL WDL       Skin Circulation/Temperature WDL    Skin Circulation/Temperature WDL WDL       Cardiac WDL    Cardiac WDL WDL       Peripheral/Neurovascular WDL    Peripheral Neurovascular WDL WDL       Cognitive/Neuro/Behavioral WDL    Cognitive/Neuro/Behavioral WDL WDL

## 2023-09-11 ENCOUNTER — TRANSFERRED RECORDS (OUTPATIENT)
Dept: HEALTH INFORMATION MANAGEMENT | Facility: CLINIC | Age: 68
End: 2023-09-11
Payer: COMMERCIAL

## 2023-09-11 ENCOUNTER — TRANSCRIBE ORDERS (OUTPATIENT)
Dept: OTHER | Age: 68
End: 2023-09-11

## 2023-09-11 ENCOUNTER — LAB REQUISITION (OUTPATIENT)
Dept: LAB | Facility: CLINIC | Age: 68
End: 2023-09-11

## 2023-09-11 DIAGNOSIS — Z12.5 ENCOUNTER FOR SCREENING FOR MALIGNANT NEOPLASM OF PROSTATE: ICD-10-CM

## 2023-09-11 DIAGNOSIS — K40.90 RIGHT INGUINAL HERNIA: Primary | ICD-10-CM

## 2023-09-11 DIAGNOSIS — E78.5 HYPERLIPIDEMIA, UNSPECIFIED: ICD-10-CM

## 2023-09-11 LAB
CHOLEST SERPL-MCNC: 114 MG/DL
HDLC SERPL-MCNC: 40 MG/DL
LDLC SERPL CALC-MCNC: 39 MG/DL
NONHDLC SERPL-MCNC: 74 MG/DL
PSA SERPL DL<=0.01 NG/ML-MCNC: 2.79 NG/ML (ref 0–4.5)
TRIGL SERPL-MCNC: 174 MG/DL

## 2023-09-11 PROCEDURE — 80061 LIPID PANEL: CPT | Performed by: FAMILY MEDICINE

## 2023-09-11 PROCEDURE — G0103 PSA SCREENING: HCPCS | Performed by: FAMILY MEDICINE

## 2023-09-15 NOTE — TELEPHONE ENCOUNTER
REFERRAL INFORMATION:  Referring Provider: Dr. Kitty Stern  Referring Clinic: [facility]  Reason for Visit/Diagnosis: Right Inguinal Hernia       FUTURE VISIT INFORMATION:  Appointment Date: 9/21/2023  Appointment Time: 3 PM     NOTES RECORD STATUS  DETAILS   OFFICE NOTE from Referring Provider Received [facility]:  9/11/23, 8/4/23 - PCC OV with Dr. Stern   OFFICE NOTE from Other Specialists Care Everywhere / Internal [facility]:  8/24/23 7/25/23 - ONC OV with Dr. Rickey Davis [facility]:  12/9/21 - PCC OV with Dr. Tenorio   HOSPITAL DISCHARGE SUMMARY/ ED VISITS  N/A    OPERATIVE REPORT N/A    PERTINENT LABS Care Everywhere / Received / Internal

## 2023-09-20 ENCOUNTER — OFFICE VISIT (OUTPATIENT)
Dept: CARDIOLOGY | Facility: CLINIC | Age: 68
End: 2023-09-20
Payer: COMMERCIAL

## 2023-09-20 VITALS
HEART RATE: 54 BPM | WEIGHT: 126 LBS | BODY MASS INDEX: 24.61 KG/M2 | RESPIRATION RATE: 16 BRPM | SYSTOLIC BLOOD PRESSURE: 98 MMHG | OXYGEN SATURATION: 98 % | DIASTOLIC BLOOD PRESSURE: 60 MMHG

## 2023-09-20 DIAGNOSIS — R53.83 OTHER FATIGUE: Primary | ICD-10-CM

## 2023-09-20 PROCEDURE — 99214 OFFICE O/P EST MOD 30 MIN: CPT | Performed by: INTERNAL MEDICINE

## 2023-09-20 RX ORDER — HYDROXYUREA 500 MG/1
500 CAPSULE ORAL DAILY
COMMUNITY
Start: 2023-08-24

## 2023-09-20 RX ORDER — OMEPRAZOLE 40 MG/1
40 CAPSULE, DELAYED RELEASE ORAL 2 TIMES DAILY
COMMUNITY

## 2023-09-20 NOTE — PROGRESS NOTES
Cardiology Clinic Office Note    Assessment / Plan:    1.  Fatigue, without apparent cardiac etiology.  No significant bradycardia enough to cause his symptoms, with normal stress testing and echocardiogram.  Suggested regular moderate exercise, targeting 20 to 30 minutes of walking each day.  2.  Muscle aches joint aches, on atorvastatin.  Given his low cardiovascular risk profile, would favor a trial off of atorvastatin to see if his symptoms improve.  If there is no improvement of his symptoms, atorvastatin could be resumed though with his low risk profile it is uncertain he would obtain benefit from this.  3.  Daytime sleepiness, sleep difficulties, have suggested formal sleep evaluation, defer to primary care provider whether to pursue further evaluation.    Follow-up as needed    ______________________________________________________________________    Subjective:    I had the opportunity to see Manpreet Dubon at the [facility]. Manpreet Dubon is a 68 year old Meag[place] speaking male with a history of hyperlipidemia, gastroesophageal reflux disease, degenerative joint disease.  He was seen in June with exertional fatigue and chest tightness along with daytime sleepiness.  Subsequent testing revealed no evidence of inducible ischemia on nuclear stress testing, normal ejection fraction with no valvular abnormalities on echo, and Holter monitoring showing some blunting of chronotropic response to exercise but no accompanying diary.  No arrhythmias were noted.  After results of these tests a sleep apnea evaluation was suggested.  It does not appear that this was accomplished.  He did undergo EGD that apparently demonstrated antral ulcers.  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  He returns today to review results of testing.  Telephone [place]  is utilized, with some difficulty .    Since I saw him last, he continues to feel tired.  He  has not experienced any chest pain.  But picking up sticks in the yard causes him to feel fatigued.  He is able to sleep at night with medications, otherwise he feels tired through much of the day and sleeps poorly at night.  He tries to avoid taking naps.  He has had no syncope or falls.    ______________________________________________________________________    Problem List:  Patient Active Problem List   Diagnosis    GERD (gastroesophageal reflux disease)    Back pain    Urinary frequency    Neuropathy    Lichen simplex chronicus    Beta thalassemia trait     Medical History:  Past Medical History:   Diagnosis Date    Anemia      Surgical History:  History reviewed. No pertinent surgical history.  Social History:  Social History     Socioeconomic History    Marital status:      Spouse name: Not on file    Number of children: Not on file    Years of education: Not on file    Highest education level: Not on file   Occupational History    Not on file   Tobacco Use    Smoking status: Never    Smokeless tobacco: Never   Vaping Use    Vaping Use: Never used   Substance and Sexual Activity    Alcohol use: No    Drug use: No    Sexual activity: Not on file   Other Topics Concern    Not on file   Social History Narrative    Not on file     Social Determinants of Health     Financial Resource Strain: Not on file   Food Insecurity: Not on file   Transportation Needs: Not on file   Physical Activity: Not on file   Stress: Not on file   Social Connections: Not on file   Interpersonal Safety: Not on file   Housing Stability: Not on file     Sleep History:  No napping during the daytime.  He does feel tired.  Able to fall asleep using medications  Exercise History:  Minimal activity.  Feels fatigued picking up sticks.  Climbs stairs at home without a problem    Review of Systems:      Positive for arm pain, fainting, heartburn, nocturia, joint pains, muscle weakness, muscle pain.  12 point review of systems otherwise  negative     Please refer above for cardiac ROS details.         Family History:  Family History   Problem Relation Age of Onset    Diabetes No family hx of     Cancer No family hx of     Heart Disease No family hx of     Coronary Artery Disease No family hx of     Hypertension No family hx of     Hyperlipidemia No family hx of     Breast Cancer No family hx of     Cancer - colorectal No family hx of     Ovarian Cancer No family hx of     Prostate Cancer No family hx of     Other Cancer No family hx of     Mental Illness No family hx of     Cerebrovascular Disease No family hx of     Anesthesia Reaction No family hx of     Asthma No family hx of     Osteoporosis No family hx of     Known Genetic Syndrome No family hx of     Obesity No family hx of     Unknown/Adopted No family hx of          Allergies:  Allergies   Allergen Reactions    Nkda [No Known Drug Allergy]      Medications:  Current Outpatient Medications   Medication Sig Dispense Refill    aspirin (ASPIRIN LOW DOSE) 81 MG EC tablet Take 1 tablet (81 mg) by mouth daily 90 tablet 3    atorvastatin (LIPITOR) 20 MG tablet Take 1 tablet (20 mg) by mouth daily 90 tablet 3    hydroxyurea (HYDREA) 500 MG capsule Take 500 mg by mouth daily      omeprazole (PRILOSEC) 40 MG DR capsule Take 40 mg by mouth 2 times daily      pregabalin (LYRICA) 50 MG capsule       acetaminophen (TYLENOL) 325 MG tablet Take 1-2 tablets (325-650 mg) by mouth every 6 hours as needed for mild pain (Patient not taking: Reported on 6/5/2023) 100 tablet 1    Ivan Protect (EUCERIN) external cream Apply topically 3 times daily (Patient not taking: Reported on 9/20/2023) 142 g 3    Calamine external lotion Apply to bilateral arms and legs three times daily (Patient not taking: Reported on 9/20/2023) 180 mL 1    calcium carbonate (TUMS) 500 MG chewable tablet Take 1 tablet (500 mg) by mouth 3 times daily as needed for heartburn (Patient not taking: Reported on 9/20/2023) 90 tablet 3    capsaicin  (ZOSTRIX) 0.025 % external cream Apply 1 g topically 3 times daily as needed (back pain) (Patient not taking: Reported on 9/20/2023) 120 g 1    cetirizine (ZYRTEC) 10 MG tablet Take 2 tablets (20 mg) by mouth daily (Patient not taking: Reported on 9/20/2023) 30 tablet 1    clobetasol (TEMOVATE) 0.05 % external cream APPLY TOPICALLY TO BACK OF NECK TWICE DAILY AS NEEDED FOR ITCHING FOR UP TO 14 DAYS (Patient not taking: Reported on 9/20/2023)      diclofenac (VOLTAREN) 1 % topical gel Apply 4 g topically 4 times daily (Patient not taking: Reported on 9/20/2023) 50 g 0    gabapentin (NEURONTIN) 300 MG capsule Take 1 capsule (300 mg) by mouth 2 times daily (Patient not taking: Reported on 9/20/2023) 60 capsule 1    hydrocortisone (WESTCORT) 0.2 % external cream Apply topically 2 times daily Apply to bites on arms and legs bilaterally (Patient not taking: Reported on 9/20/2023) 45 g 1    permethrin (ELIMITE) 5 % external cream Apply cream from head to toe (except the face); leave on for 8-14 hours then wash off with water; reapply in 1 week if live mites appear. (Patient not taking: Reported on 9/20/2023) 60 g 1    Skin Protectants, Misc. (EUCERIN) cream Apply topically 3 times daily (Patient not taking: Reported on 9/20/2023) 454 g 3       Objective:   Wt Readings from Last 3 Encounters:   09/20/23 57.2 kg (126 lb)   08/05/23 58.1 kg (128 lb)   06/05/23 58.3 kg (128 lb 9 oz)     Vital signs:  BP 98/60 (BP Location: Right arm, Patient Position: Sitting, Cuff Size: Adult Regular)   Pulse 54   Resp 16   Wt 57.2 kg (126 lb)   SpO2 98%   BMI 24.61 kg/m        Physical Exam:    General Appearance : Awake, Alert, No acute distress  HEENT: No Scleral icterus; the mucous membranes were pink and moist.  Conjunctivae not injected  Neck:  No cervical bruits, jugular venous distention, or thyromegaly   Chest: The spine was straight. Chest wall symmetric  Lungs: Respirations unlabored; the lungs are clear to auscultation.  No  wheezing   Cardiovascular:   Normal point of maximal impulse.  Auscultation reveals normal first and second heart sounds with no murmurs, rubs, or gallops.  Carotid, radial, and dorsalis pedal pulses are intact and symmetric.    Abdomen: No organomegaly, masses, bruits, or tenderness. Bowels sounds are present  Extremities:  No clubbing, cyanosis.  N edema  Skin: No rash, bruising  Musculoskeletal: No tenderness.  Neurologic: Alert and oriented ×3. Speech is fluent.    Lab Results:  LIPIDS:  Lab Results   Component Value Date    CHOL 114 09/11/2023    CHOL 123 08/10/2022    CHOL 171 05/17/2021     Lab Results   Component Value Date    HDL 40 09/11/2023    HDL 44 08/10/2022    HDL 40 05/17/2021     Lab Results   Component Value Date    LDL 39 09/11/2023    LDL 55 08/10/2022     05/17/2021     Lab Results   Component Value Date    TRIG 174 (H) 09/11/2023    TRIG 120 08/10/2022    TRIG 144.4 07/03/2019       Pharmacologic nuclear stress test 7/2023:    The nuclear stress test is negative for inducible myocardial ischemia or infarction.    Left ventricular function is normal.    The left ventricular ejection fraction at rest is 62%.    There is no prior study for comparison.     24-hour Holter monitor 6/2023:  Normal sinus rhythm, with heart rate response which appears to be somewhat blunted with an average heart rate of 57 bpm and a peak heart rate of 85 bpm.   : Borderline Holter monitor recording by virtue of the presence of relatively blunted heart rate response to activities of daily living. Unfortunately the patient did not return a diary which would permit correlation between any symptoms which might be related to inadequate heart rate response at the time.    Echocardiogram 6/2023:  1.Left ventricular size, wall motion and function are normal. The ejection  fraction is 60-65%.  2.Normal right ventricle size and systolic function.  3.No hemodynamically significant valvular abnormalities on 2D or color  flow  imaging, minimal regurg of the valves as below.  There is no comparison study available.        MARIA G GRIMM MD [facility]  [phone]    This note created using Dragon voice recognition software.  Sound alike errors may have escaped editing.

## 2023-09-20 NOTE — PATIENT INSTRUCTIONS
Stop taking the atorvastatin.  See if this helps with your muscle and joint pains.  Try to get 20 or 30 minutes of walking in at a brisk pace every day, once your ankle feels better.  Talk with your primary care provider about whether to pursue a sleep apnea evaluation

## 2023-09-20 NOTE — LETTER
9/20/2023    Physician No Ref-Primary  No address on file    RE: Manpreet Dubon       Dear Colleague,     I had the pleasure of seeing Manpreet Dubon in the [facility].    Cardiology Clinic Office Note    Assessment / Plan:    1.  Fatigue, without apparent cardiac etiology.  No significant bradycardia enough to cause his symptoms, with normal stress testing and echocardiogram.  Suggested regular moderate exercise, targeting 20 to 30 minutes of walking each day.  2.  Muscle aches joint aches, on atorvastatin.  Given his low cardiovascular risk profile, would favor a trial off of atorvastatin to see if his symptoms improve.  If there is no improvement of his symptoms, atorvastatin could be resumed though with his low risk profile it is uncertain he would obtain benefit from this.  3.  Daytime sleepiness, sleep difficulties, have suggested formal sleep evaluation, defer to primary care provider whether to pursue further evaluation.    Follow-up as needed    ______________________________________________________________________    Subjective:    I had the opportunity to see Manpreet Dubon at the [facility]. Manpreet Dubon is a 68 year old Meaghan speaking male with a history of hyperlipidemia, gastroesophageal reflux disease, degenerative joint disease.  He was seen in June with exertional fatigue and chest tightness along with daytime sleepiness.  Subsequent testing revealed no evidence of inducible ischemia on nuclear stress testing, normal ejection fraction with no valvular abnormalities on echo, and Holter monitoring showing some blunting of chronotropic response to exercise but no accompanying diary.  No arrhythmias were noted.  After results of these tests a sleep apnea evaluation was suggested.  It does not appear that this was accomplished.  He did undergo EGD that apparently demonstrated antral ulcers.  He has also been diagnosed with essential thrombocytosis by bone  marrow biopsy.  He returns today to review results of testing.  Telephone [place]  is utilized, with some difficulty .    Since I saw him last, he continues to feel tired.  He has not experienced any chest pain.  But picking up sticks in the yard causes him to feel fatigued.  He is able to sleep at night with medications, otherwise he feels tired through much of the day and sleeps poorly at night.  He tries to avoid taking naps.  He has had no syncope or falls.    ______________________________________________________________________    Problem List:  Patient Active Problem List   Diagnosis    GERD (gastroesophageal reflux disease)    Back pain    Urinary frequency    Neuropathy    Lichen simplex chronicus    Beta thalassemia trait     Medical History:  Past Medical History:   Diagnosis Date    Anemia      Surgical History:  History reviewed. No pertinent surgical history.  Social History:  Social History     Socioeconomic History    Marital status:      Spouse name: Not on file    Number of children: Not on file    Years of education: Not on file    Highest education level: Not on file   Occupational History    Not on file   Tobacco Use    Smoking status: Never    Smokeless tobacco: Never   Vaping Use    Vaping Use: Never used   Substance and Sexual Activity    Alcohol use: No    Drug use: No    Sexual activity: Not on file   Other Topics Concern    Not on file   Social History Narrative    Not on file     Social Determinants of Health     Financial Resource Strain: Not on file   Food Insecurity: Not on file   Transportation Needs: Not on file   Physical Activity: Not on file   Stress: Not on file   Social Connections: Not on file   Interpersonal Safety: Not on file   Housing Stability: Not on file     Sleep History:  No napping during the daytime.  He does feel tired.  Able to fall asleep using medications  Exercise History:  Minimal activity.  Feels fatigued picking up sticks.  Climbs stairs at  home without a problem    Review of Systems:      Positive for arm pain, fainting, heartburn, nocturia, joint pains, muscle weakness, muscle pain.  12 point review of systems otherwise negative     Please refer above for cardiac ROS details.         Family History:  Family History   Problem Relation Age of Onset    Diabetes No family hx of     Cancer No family hx of     Heart Disease No family hx of     Coronary Artery Disease No family hx of     Hypertension No family hx of     Hyperlipidemia No family hx of     Breast Cancer No family hx of     Cancer - colorectal No family hx of     Ovarian Cancer No family hx of     Prostate Cancer No family hx of     Other Cancer No family hx of     Mental Illness No family hx of     Cerebrovascular Disease No family hx of     Anesthesia Reaction No family hx of     Asthma No family hx of     Osteoporosis No family hx of     Known Genetic Syndrome No family hx of     Obesity No family hx of     Unknown/Adopted No family hx of          Allergies:  Allergies   Allergen Reactions    Nkda [No Known Drug Allergy]      Medications:  Current Outpatient Medications   Medication Sig Dispense Refill    aspirin (ASPIRIN LOW DOSE) 81 MG EC tablet Take 1 tablet (81 mg) by mouth daily 90 tablet 3    atorvastatin (LIPITOR) 20 MG tablet Take 1 tablet (20 mg) by mouth daily 90 tablet 3    hydroxyurea (HYDREA) 500 MG capsule Take 500 mg by mouth daily      omeprazole (PRILOSEC) 40 MG DR capsule Take 40 mg by mouth 2 times daily      pregabalin (LYRICA) 50 MG capsule       acetaminophen (TYLENOL) 325 MG tablet Take 1-2 tablets (325-650 mg) by mouth every 6 hours as needed for mild pain (Patient not taking: Reported on 6/5/2023) 100 tablet 1    Ivan Protect (EUCERIN) external cream Apply topically 3 times daily (Patient not taking: Reported on 9/20/2023) 142 g 3    Calamine external lotion Apply to bilateral arms and legs three times daily (Patient not taking: Reported on 9/20/2023) 180 mL 1     calcium carbonate (TUMS) 500 MG chewable tablet Take 1 tablet (500 mg) by mouth 3 times daily as needed for heartburn (Patient not taking: Reported on 9/20/2023) 90 tablet 3    capsaicin (ZOSTRIX) 0.025 % external cream Apply 1 g topically 3 times daily as needed (back pain) (Patient not taking: Reported on 9/20/2023) 120 g 1    cetirizine (ZYRTEC) 10 MG tablet Take 2 tablets (20 mg) by mouth daily (Patient not taking: Reported on 9/20/2023) 30 tablet 1    clobetasol (TEMOVATE) 0.05 % external cream APPLY TOPICALLY TO BACK OF NECK TWICE DAILY AS NEEDED FOR ITCHING FOR UP TO 14 DAYS (Patient not taking: Reported on 9/20/2023)      diclofenac (VOLTAREN) 1 % topical gel Apply 4 g topically 4 times daily (Patient not taking: Reported on 9/20/2023) 50 g 0    gabapentin (NEURONTIN) 300 MG capsule Take 1 capsule (300 mg) by mouth 2 times daily (Patient not taking: Reported on 9/20/2023) 60 capsule 1    hydrocortisone (WESTCORT) 0.2 % external cream Apply topically 2 times daily Apply to bites on arms and legs bilaterally (Patient not taking: Reported on 9/20/2023) 45 g 1    permethrin (ELIMITE) 5 % external cream Apply cream from head to toe (except the face); leave on for 8-14 hours then wash off with water; reapply in 1 week if live mites appear. (Patient not taking: Reported on 9/20/2023) 60 g 1    Skin Protectants, Misc. (EUCERIN) cream Apply topically 3 times daily (Patient not taking: Reported on 9/20/2023) 454 g 3       Objective:   Wt Readings from Last 3 Encounters:   09/20/23 57.2 kg (126 lb)   08/05/23 58.1 kg (128 lb)   06/05/23 58.3 kg (128 lb 9 oz)     Vital signs:  BP 98/60 (BP Location: Right arm, Patient Position: Sitting, Cuff Size: Adult Regular)   Pulse 54   Resp 16   Wt 57.2 kg (126 lb)   SpO2 98%   BMI 24.61 kg/m        Physical Exam:    General Appearance : Awake, Alert, No acute distress  HEENT: No Scleral icterus; the mucous membranes were pink and moist.  Conjunctivae not injected  Neck:  No  cervical bruits, jugular venous distention, or thyromegaly   Chest: The spine was straight. Chest wall symmetric  Lungs: Respirations unlabored; the lungs are clear to auscultation.  No wheezing   Cardiovascular:   Normal point of maximal impulse.  Auscultation reveals normal first and second heart sounds with no murmurs, rubs, or gallops.  Carotid, radial, and dorsalis pedal pulses are intact and symmetric.    Abdomen: No organomegaly, masses, bruits, or tenderness. Bowels sounds are present  Extremities:  No clubbing, cyanosis.  N edema  Skin: No rash, bruising  Musculoskeletal: No tenderness.  Neurologic: Alert and oriented ×3. Speech is fluent.    Lab Results:  LIPIDS:  Lab Results   Component Value Date    CHOL 114 09/11/2023    CHOL 123 08/10/2022    CHOL 171 05/17/2021     Lab Results   Component Value Date    HDL 40 09/11/2023    HDL 44 08/10/2022    HDL 40 05/17/2021     Lab Results   Component Value Date    LDL 39 09/11/2023    LDL 55 08/10/2022     05/17/2021     Lab Results   Component Value Date    TRIG 174 (H) 09/11/2023    TRIG 120 08/10/2022    TRIG 144.4 07/03/2019       Pharmacologic nuclear stress test 7/2023:    The nuclear stress test is negative for inducible myocardial ischemia or infarction.    Left ventricular function is normal.    The left ventricular ejection fraction at rest is 62%.    There is no prior study for comparison.     24-hour Holter monitor 6/2023:  Normal sinus rhythm, with heart rate response which appears to be somewhat blunted with an average heart rate of 57 bpm and a peak heart rate of 85 bpm.   : Borderline Holter monitor recording by virtue of the presence of relatively blunted heart rate response to activities of daily living. Unfortunately the patient did not return a diary which would permit correlation between any symptoms which might be related to inadequate heart rate response at the time.    Echocardiogram 6/2023:  1.Left ventricular size, wall motion and  function are normal. The ejection  fraction is 60-65%.  2.Normal right ventricle size and systolic function.  3.No hemodynamically significant valvular abnormalities on 2D or color flow  imaging, minimal regurg of the valves as below.  There is no comparison study available.        MARIA G GRIMM MD [facility]  [phone]    This note created using Dragon voice recognition software.  Sound alike errors may have escaped editing.             Thank you for allowing me to participate in the care of your patient.      Sincerely,     Maria G Grimm MD     [facility]  cc:   No referring provider defined for this encounter.

## 2023-09-21 ENCOUNTER — PRE VISIT (OUTPATIENT)
Dept: SURGERY | Facility: CLINIC | Age: 68
End: 2023-09-21

## 2023-09-21 ENCOUNTER — OFFICE VISIT (OUTPATIENT)
Dept: SURGERY | Facility: CLINIC | Age: 68
End: 2023-09-21
Attending: FAMILY MEDICINE
Payer: COMMERCIAL

## 2023-09-21 VITALS
HEART RATE: 57 BPM | OXYGEN SATURATION: 98 % | SYSTOLIC BLOOD PRESSURE: 119 MMHG | WEIGHT: 124.7 LBS | HEIGHT: 60 IN | BODY MASS INDEX: 24.48 KG/M2 | DIASTOLIC BLOOD PRESSURE: 72 MMHG

## 2023-09-21 DIAGNOSIS — K40.90 RIGHT INGUINAL HERNIA: ICD-10-CM

## 2023-09-21 PROCEDURE — 99203 OFFICE O/P NEW LOW 30 MIN: CPT | Performed by: SURGERY

## 2023-09-21 RX ORDER — CEFAZOLIN SODIUM 2 G/50ML
2 SOLUTION INTRAVENOUS SEE ADMIN INSTRUCTIONS
Status: CANCELLED | OUTPATIENT
Start: 2023-09-21

## 2023-09-21 RX ORDER — CEFAZOLIN SODIUM 2 G/50ML
2 SOLUTION INTRAVENOUS
Status: CANCELLED | OUTPATIENT
Start: 2023-09-21

## 2023-09-21 ASSESSMENT — PAIN SCALES - GENERAL: PAINLEVEL: SEVERE PAIN (6)

## 2023-09-21 NOTE — NURSING NOTE
Chief Complaint   Patient presents with    New Patient     Right inguinal hernia       Vitals:    09/21/23 1443   BP: 119/72   BP Location: Left arm   Patient Position: Sitting   Cuff Size: Adult Regular   Pulse: 57   SpO2: 98%   Weight: 56.6 kg (124 lb 11.2 oz)   Height: 1.524 m (5')       Body mass index is 24.35 kg/m .                          Domingo Barrow, EMT

## 2023-09-21 NOTE — NURSING NOTE
Pre and Post op Patient Education/Teaching Flowsheet  Relevant Diagnosis:  Inguinal Hernia (K40.90)  Teaching Topic:  Pre and post op teaching  Person(s) Involved in teaching:  Patient     Motivation Level:  Asks Questions:  Yes  Eager to Learn:  Yes  Cooperative:  Yes  Receptive (willing/able to accept information):  Yes  Any cultural factors/Latter day beliefs that may influence understanding or compliance?  No    Patient/caregiver/family demonstrates understanding of the following:  Reason for the appointment, diagnosis, and treatment plan:  Yes  Patient demonstrates understanding of the following:  Pre-op bowel prep:  N/A  Post-op pain management recommendations (medications, ice compress, binder/athletic supporter (if applicable), etc.:  Yes  Inguinal hernia patients:  Post-op urinary retention- discussed signs/symptoms and visit to ER for Jiménez catheter placement and to stay in place for at least 48 hours:  NA  Restrictions:  Yes  Medications to take the day of surgery:  Per PCP  Blood thinner medications discussed and when to stop (if applicable):  Yes  Wound care:  Yes  Diabetes medication management (if applicable):  Per PCP  Which situations necessitate calling provider and whom to contact:  Discussed how to contact the hospital, nurse, and clinic scheduling staff if necessary      Date and time of surgery:  TBD  Location of surgery: [facility]  History and Physical and any other testing necessary prior to surgery:  Yes  Required time line for completion of History and Physical and any pre-op testing:  Yes  Discuss need for someone to drive patient home and stay with them for 24 hours:  Yes  Pre-op showering/scrub information with Surgical Scrub:  Yes  NPO Guidelines:  NPO per Anesthesia Guidelines    Infection Prevention: Patient demonstrates understanding of the following:  Patient instructed on hand hygiene:  Yes  Surgical procedure site care will be taught  and will be reviewed at the time of discharge  Signs and symptoms of infection taught:  Yes  Wound care reviewed and will be taught at the time of discharge  Central venous catheter care will be taught at the time of discharge (if applicable)    Post-op follow-up:  Instructional materials used/given/mailed:  Surgical logistics, post op teaching sheet, and surgical scrub

## 2023-09-21 NOTE — PROGRESS NOTES
Manpreet Dubon is a 68 year old male with a 1 month history of a right inguinal mass with the following symptoms of lump.    Onset did not occur with lifting.  Obstructive symptoms:  no  Urinary difficulties:  yes  Chronic cough: no  Constipation:  no  Current level of activity:  at home with family    Past medical and surgical history, medications, allergies, family history, and social history were reviewed with the patient.    ROS: 10 point review of systems negative except noted in HPI  PHYSICAL EXAM  General appearance- healthy, alert, and in no distress.  Skin- Skin color and turgor normal.  No obvious rashes.  Neck- Neck is supple without obvious adenopathy.  Lungs- Respiratory effort unlabored.  Gait- Normal.  Abdomen - soft non distended, non tender without obvious masses.   [facility]    Impression:  Inguinal hernia, right  Recommendation:  Right Inguinal Hernioplasty open mesh repair under mac.    A full discussion regarding the alternatives, risks, goals, and potential complications for this surgery was completed today.  The patient understood I would use non recalled mesh and  that the potential problems included but are not limited to:  Infection, bleeding, hematoma, seroma, recurrence, injury to nerve/muscle or involved testicle(if male), ischemic orchitis(if male), and chronic pain.    The patient verbally expressed understanding, was given the opportunity for questions, and gives full informed consent for the procedure.      Today the patient was instructed on the need for a preoperative H&P, NPO status prior to surgery, and the need to stop anticoagulants prior to surgery.  Additional educational material, soap, and instructions will be mailed out to the patients home.    The total time spent with this patient and the  was 30 minutes.  The total time was spent on the date of encounter doing chart review, history and physical, documentation, patient education, and any further activity as noted  above.

## 2023-09-21 NOTE — PATIENT INSTRUCTIONS
You met with Dr. Gray Jones.      Today's visit instructions:    Reminder: Surgery Requirements  Your surgery will be at [facility]- 5th Floor.  You will need to arrive 1.5  hours early.  You will need someone to drive you home (over 18 years old) and stay with you for 24 hours after the procedure.  You will need a preop physical with your regular doctor within 30 days of surgery- closer is always better.  Stop any blood thinners, vitamins, minerals, or herbal supplements 5 days before surgery.  If you are taking a prescribed blood thinner please let us know for specific instructions.  Fasting- a nurse from [facility] will call you 1-2 days before surgery to confirm your procedure and tell you when to stop eating and drinking.   Wash with the soap (Antibacterial Dial Foaming Complete , Hibiclense, or soap given/mailed from the clinic) the night before surgery and morning of surgery. See instructions in the Surgery Packet.  If you would like a procedure estimate please call Cost of Care at [phone] during the hours of 8 AM - 3 PM (option #2 for on-line request and option #3 for a representative).        If you have questions please contact Sarah RN or Deena RN during regular clinic hours, Monday through Friday 7:30 AM - 4:00 PM, or you can contact us via [facility] at anytime.       If you have urgent needs after-hours, weekends, or holidays please call the hospital at [phone] and ask to speak with our on-call General Surgery Team.    Appointment [phone]  Nurse Advice (Sarah or Deena): [phone]   Surgery Scheduler (Madelaine): [phone]  Fax: [phone]    After Your Open Inguinal Hernia Repair         Incision care   You may take a shower the day after surgery. Carefully wash your incision with soap and water. Do not submerge yourself in water (bath, whirlpool, hot tub, pool, lake) for 14 days after surgery.   Remove the bandage the day after  surgery, but leave the medical tape (Steri-Strips) or glue in place. These will loosen and fall off on their own 1-2 weeks after surgery.    Always wash your hands before touching your incisions or removing bandages.   It is not unusual to form a collection of fluid or blood under your incision that may feel firm or squishy- it can take several weeks to months for your body to reabsorb it.  At times, it may even drain.  If that should happen keep the area clean with soap, water,  and cover with a clean gauze dressing. You can change this daily or as needed.  It is not unusual to develop swelling and/or bruising of the scrotum and penis and it can take several weeks or a month to improve.      Other medicines   Wait to start aspirin or blood thinners until the day after surgery. You can take any other regular medicines at your normal time the day after surgery.   Your pain medicine may cause constipation (hard, dry stools). To help with this, take the stool softener your doctor gave you or an over-the-counter stool softener or laxative. You can stop taking this when you are no longer taking pain medicine and your bowel movements are back to normal.      For pain or discomfort   Take the narcotic pain medicine your doctor gave you as needed and as instructed on the bottle. If you prefer to use over-the-counter medication, use acetaminophen (Tylenol) or ibuprofen (Advil, Motrin) as instructed on the box. Do not take Tylenol if it is in your narcotic pain medication.    Use an ice pack on your groin for 20 minutes at a time as needed for the first 24 hours. Be sure to protect your skin by putting a cloth between the ice pack and your skin.   After 24 hours you can switch to heat for 20 minutes as needed. Be sure to protect your skin by putting a cloth between the heat pack and your skin.    It is normal to feel a lump in your groin after surgery. This lump may take up to 8 weeks to go away.      Activities   No driving  until you feel it s safe to do so. Don t drive while taking narcotic pain medicine.   Don t lift anything heavier than 20 pounds for 3 weeks after surgery.      Special equipment   Male Patients:  We recommend that you wear scrotal support for the first 3 days after surgery; however, you can wear it longer if you wish.  We suggest using an athletic supporter (Jock Strap), snug briefs, or Spandex biker shorts.     Diet   You can eat your regular meals after surgery.      When to call the doctor   Call your doctor if you have:   A fever above 101 F (38.3 C) (taken under the tongue), or a fever or chills lasting more than a day.   Redness at the incision site.   Any fluid or blood draining from the incision, especially if it smells bad.    Severe pain that doesn t improve with pain medicine.      We will call you 2 to 4 days after surgery to review this handout, answer questions and help arrange after-surgery care. If you have questions or concerns, please call [phone] during regular office hours. If you need to call after business hours, call [phone] and ask to page the surgeon on-call.     IMPORTANT:  Prior to your surgical procedure, a nurse will be contacting you to obtain a health history.   If they do not reach you by noon the day prior to your surgery, your surgery will be cancelled. If you have your Pre-Op Surgical Physical (H&P) with the Anesthesia Team (PAC), you are exempt from this call. Phone:  [phone] ([facility]) or [phone] ([place]).

## 2023-09-21 NOTE — LETTER
9/21/2023       RE: Manpreet Dubon  [address]  [place] [address]     Dear Colleague,    Thank you for referring your patient, Manpreet Dubon, to the [facility]. Please see a copy of my visit note below.    Manpreet Dubon is a 68 year old male with a 1 month history of a right inguinal mass with the following symptoms of lump.    Onset did not occur with lifting.  Obstructive symptoms:  no  Urinary difficulties:  yes  Chronic cough: no  Constipation:  no  Current level of activity:  at home with family    Past medical and surgical history, medications, allergies, family history, and social history were reviewed with the patient.    ROS: 10 point review of systems negative except noted in HPI  PHYSICAL EXAM  General appearance- healthy, alert, and in no distress.  Skin- Skin color and turgor normal.  No obvious rashes.  Neck- Neck is supple without obvious adenopathy.  Lungs- Respiratory effort unlabored.  Gait- Normal.  Abdomen - soft non distended, non tender without obvious masses.   [facility]    Impression:  Inguinal hernia, right  Recommendation:  Right Inguinal Hernioplasty open mesh repair under mac.    A full discussion regarding the alternatives, risks, goals, and potential complications for this surgery was completed today.  The patient understood I would use non recalled mesh and  that the potential problems included but are not limited to:  Infection, bleeding, hematoma, seroma, recurrence, injury to nerve/muscle or involved testicle(if male), ischemic orchitis(if male), and chronic pain.    The patient verbally expressed understanding, was given the opportunity for questions, and gives full informed consent for the procedure.      Today the patient was instructed on the need for a preoperative H&P, NPO status prior to surgery, and the need to stop anticoagulants prior to surgery.  Additional educational  material, soap, and instructions will be mailed out to the patients home.    The total time spent with this patient and the  was 30 minutes.  The total time was spent on the date of encounter doing chart review, history and physical, documentation, patient education, and any further activity as noted above.              Again, thank you for allowing me to participate in the care of your patient.      Sincerely,    Gray Jones MD

## 2023-09-22 ENCOUNTER — TELEPHONE (OUTPATIENT)
Dept: SURGERY | Facility: CLINIC | Age: 68
End: 2023-09-22
Payer: COMMERCIAL

## 2023-09-22 PROBLEM — K40.90 RIGHT INGUINAL HERNIA: Status: ACTIVE | Noted: 2023-09-21

## 2023-09-22 NOTE — TELEPHONE ENCOUNTER
Patient is scheduled for surgery with Dr. Jones    Spoke with: Saw via     Date of Surgery: 11/17/2023    Location: [facility]    Informed patient they will need an adult : Yes    Pre op with Provider n/a    H&P: Scheduled with PCP - [facility] on 11/13/2023    Additional imaging/appointments: n/a    Surgery packet: To be sent iv the [place] mail     Additional comments: n/a        Madelaine Tenorio on 9/22/2023 at 10:54 AM

## 2023-11-08 ENCOUNTER — LAB REQUISITION (OUTPATIENT)
Dept: LAB | Facility: CLINIC | Age: 68
End: 2023-11-08

## 2023-11-08 PROCEDURE — 80053 COMPREHEN METABOLIC PANEL: CPT | Performed by: FAMILY MEDICINE

## 2023-11-09 LAB
ALBUMIN SERPL BCG-MCNC: 4.1 G/DL (ref 3.5–5.2)
ALP SERPL-CCNC: 89 U/L (ref 40–129)
ALT SERPL W P-5'-P-CCNC: 35 U/L (ref 0–70)
ANION GAP SERPL CALCULATED.3IONS-SCNC: 10 MMOL/L (ref 7–15)
AST SERPL W P-5'-P-CCNC: 27 U/L (ref 0–45)
BILIRUB SERPL-MCNC: 0.4 MG/DL
BUN SERPL-MCNC: 13.5 MG/DL (ref 8–23)
CALCIUM SERPL-MCNC: 9.2 MG/DL (ref 8.8–10.2)
CHLORIDE SERPL-SCNC: 104 MMOL/L (ref 98–107)
CREAT SERPL-MCNC: 0.99 MG/DL (ref 0.67–1.17)
DEPRECATED HCO3 PLAS-SCNC: 26 MMOL/L (ref 22–29)
EGFRCR SERPLBLD CKD-EPI 2021: 83 ML/MIN/1.73M2
GLUCOSE SERPL-MCNC: 85 MG/DL (ref 70–99)
POTASSIUM SERPL-SCNC: 4.7 MMOL/L (ref 3.4–5.3)
PROT SERPL-MCNC: 7.3 G/DL (ref 6.4–8.3)
SODIUM SERPL-SCNC: 140 MMOL/L (ref 135–145)

## 2023-11-11 ENCOUNTER — ANESTHESIA EVENT (OUTPATIENT)
Dept: SURGERY | Facility: AMBULATORY SURGERY CENTER | Age: 68
End: 2023-11-11
Payer: COMMERCIAL

## 2023-11-17 ENCOUNTER — ANESTHESIA (OUTPATIENT)
Dept: SURGERY | Facility: AMBULATORY SURGERY CENTER | Age: 68
End: 2023-11-17
Payer: COMMERCIAL

## 2023-11-17 ENCOUNTER — HOSPITAL ENCOUNTER (OUTPATIENT)
Facility: AMBULATORY SURGERY CENTER | Age: 68
Discharge: HOME | End: 2023-11-17
Attending: SURGERY
Payer: COMMERCIAL

## 2023-11-17 VITALS
DIASTOLIC BLOOD PRESSURE: 68 MMHG | OXYGEN SATURATION: 98 % | WEIGHT: 124 LBS | RESPIRATION RATE: 16 BRPM | TEMPERATURE: 97.4 F | SYSTOLIC BLOOD PRESSURE: 122 MMHG | BODY MASS INDEX: 24.35 KG/M2 | HEART RATE: 57 BPM | HEIGHT: 60 IN

## 2023-11-17 DIAGNOSIS — K40.90 RIGHT INGUINAL HERNIA: Primary | ICD-10-CM

## 2023-11-17 PROCEDURE — 49505 PRP I/HERN INIT REDUC >5 YR: CPT | Mod: RT | Performed by: SURGERY

## 2023-11-17 DEVICE — MESH HERNIA ABDOMINAL SELF GRIPPING 15X9CM PROGRIP TEM1509G: Type: IMPLANTABLE DEVICE | Site: GROIN | Status: FUNCTIONAL

## 2023-11-17 RX ORDER — SODIUM CHLORIDE, SODIUM LACTATE, POTASSIUM CHLORIDE, CALCIUM CHLORIDE 600; 310; 30; 20 MG/100ML; MG/100ML; MG/100ML; MG/100ML
INJECTION, SOLUTION INTRAVENOUS CONTINUOUS
Status: DISCONTINUED | OUTPATIENT
Start: 2023-11-17 | End: 2023-11-17 | Stop reason: HOSPADM

## 2023-11-17 RX ORDER — ACETAMINOPHEN 325 MG/1
975 TABLET ORAL ONCE
Status: COMPLETED | OUTPATIENT
Start: 2023-11-17 | End: 2023-11-17

## 2023-11-17 RX ORDER — OXYCODONE HYDROCHLORIDE 5 MG/1
5 TABLET ORAL
Status: COMPLETED | OUTPATIENT
Start: 2023-11-17 | End: 2023-11-17

## 2023-11-17 RX ORDER — LIDOCAINE 40 MG/G
CREAM TOPICAL
Status: DISCONTINUED | OUTPATIENT
Start: 2023-11-17 | End: 2023-11-17 | Stop reason: HOSPADM

## 2023-11-17 RX ORDER — DEXAMETHASONE SODIUM PHOSPHATE 4 MG/ML
INJECTION, SOLUTION INTRA-ARTICULAR; INTRALESIONAL; INTRAMUSCULAR; INTRAVENOUS; SOFT TISSUE PRN
Status: DISCONTINUED | OUTPATIENT
Start: 2023-11-17 | End: 2023-11-17

## 2023-11-17 RX ORDER — PROPOFOL 10 MG/ML
INJECTION, EMULSION INTRAVENOUS PRN
Status: DISCONTINUED | OUTPATIENT
Start: 2023-11-17 | End: 2023-11-17

## 2023-11-17 RX ORDER — OXYCODONE HYDROCHLORIDE 5 MG/1
10 TABLET ORAL
Status: DISCONTINUED | OUTPATIENT
Start: 2023-11-17 | End: 2023-11-18 | Stop reason: HOSPADM

## 2023-11-17 RX ORDER — HYDROCODONE BITARTRATE AND ACETAMINOPHEN 5; 325 MG/1; MG/1
1-2 TABLET ORAL EVERY 4 HOURS PRN
Qty: 15 TABLET | Refills: 0 | Status: SHIPPED | OUTPATIENT
Start: 2023-11-17

## 2023-11-17 RX ORDER — LIDOCAINE HYDROCHLORIDE 20 MG/ML
INJECTION, SOLUTION INFILTRATION; PERINEURAL PRN
Status: DISCONTINUED | OUTPATIENT
Start: 2023-11-17 | End: 2023-11-17

## 2023-11-17 RX ORDER — ONDANSETRON 4 MG/1
4 TABLET, ORALLY DISINTEGRATING ORAL EVERY 30 MIN PRN
Status: DISCONTINUED | OUTPATIENT
Start: 2023-11-17 | End: 2023-11-18 | Stop reason: HOSPADM

## 2023-11-17 RX ORDER — BUPIVACAINE HYDROCHLORIDE 2.5 MG/ML
INJECTION, SOLUTION INFILTRATION; PERINEURAL PRN
Status: DISCONTINUED | OUTPATIENT
Start: 2023-11-17 | End: 2023-11-17 | Stop reason: HOSPADM

## 2023-11-17 RX ORDER — CEFAZOLIN SODIUM 2 G/50ML
2 SOLUTION INTRAVENOUS
Status: COMPLETED | OUTPATIENT
Start: 2023-11-17 | End: 2023-11-17

## 2023-11-17 RX ORDER — KETOROLAC TROMETHAMINE 30 MG/ML
INJECTION, SOLUTION INTRAMUSCULAR; INTRAVENOUS PRN
Status: DISCONTINUED | OUTPATIENT
Start: 2023-11-17 | End: 2023-11-17

## 2023-11-17 RX ORDER — PROPOFOL 10 MG/ML
INJECTION, EMULSION INTRAVENOUS CONTINUOUS PRN
Status: DISCONTINUED | OUTPATIENT
Start: 2023-11-17 | End: 2023-11-17

## 2023-11-17 RX ORDER — AMOXICILLIN 250 MG
1-2 CAPSULE ORAL 2 TIMES DAILY
Qty: 15 TABLET | Refills: 0 | Status: SHIPPED | OUTPATIENT
Start: 2023-11-17

## 2023-11-17 RX ORDER — ONDANSETRON 2 MG/ML
INJECTION INTRAMUSCULAR; INTRAVENOUS PRN
Status: DISCONTINUED | OUTPATIENT
Start: 2023-11-17 | End: 2023-11-17

## 2023-11-17 RX ORDER — ONDANSETRON 2 MG/ML
4 INJECTION INTRAMUSCULAR; INTRAVENOUS EVERY 30 MIN PRN
Status: DISCONTINUED | OUTPATIENT
Start: 2023-11-17 | End: 2023-11-18 | Stop reason: HOSPADM

## 2023-11-17 RX ORDER — FENTANYL CITRATE 50 UG/ML
INJECTION, SOLUTION INTRAMUSCULAR; INTRAVENOUS PRN
Status: DISCONTINUED | OUTPATIENT
Start: 2023-11-17 | End: 2023-11-17

## 2023-11-17 RX ORDER — CEFAZOLIN SODIUM 2 G/50ML
2 SOLUTION INTRAVENOUS SEE ADMIN INSTRUCTIONS
Status: DISCONTINUED | OUTPATIENT
Start: 2023-11-17 | End: 2023-11-17 | Stop reason: HOSPADM

## 2023-11-17 RX ADMIN — FENTANYL CITRATE 25 MCG: 50 INJECTION, SOLUTION INTRAMUSCULAR; INTRAVENOUS at 07:57

## 2023-11-17 RX ADMIN — PROPOFOL 40 MG: 10 INJECTION, EMULSION INTRAVENOUS at 07:22

## 2023-11-17 RX ADMIN — DEXAMETHASONE SODIUM PHOSPHATE 4 MG: 4 INJECTION, SOLUTION INTRA-ARTICULAR; INTRALESIONAL; INTRAMUSCULAR; INTRAVENOUS; SOFT TISSUE at 07:25

## 2023-11-17 RX ADMIN — FENTANYL CITRATE 25 MCG: 50 INJECTION, SOLUTION INTRAMUSCULAR; INTRAVENOUS at 07:29

## 2023-11-17 RX ADMIN — KETOROLAC TROMETHAMINE 15 MG: 30 INJECTION, SOLUTION INTRAMUSCULAR; INTRAVENOUS at 08:05

## 2023-11-17 RX ADMIN — SODIUM CHLORIDE, SODIUM LACTATE, POTASSIUM CHLORIDE, CALCIUM CHLORIDE: 600; 310; 30; 20 INJECTION, SOLUTION INTRAVENOUS at 07:12

## 2023-11-17 RX ADMIN — LIDOCAINE HYDROCHLORIDE 40 MG: 20 INJECTION, SOLUTION INFILTRATION; PERINEURAL at 07:22

## 2023-11-17 RX ADMIN — OXYCODONE HYDROCHLORIDE 5 MG: 5 TABLET ORAL at 08:41

## 2023-11-17 RX ADMIN — PROPOFOL 200 MCG/KG/MIN: 10 INJECTION, EMULSION INTRAVENOUS at 07:22

## 2023-11-17 RX ADMIN — CEFAZOLIN SODIUM 2 G: 2 SOLUTION INTRAVENOUS at 07:12

## 2023-11-17 RX ADMIN — ACETAMINOPHEN 975 MG: 325 TABLET ORAL at 06:48

## 2023-11-17 RX ADMIN — FENTANYL CITRATE 25 MCG: 50 INJECTION, SOLUTION INTRAMUSCULAR; INTRAVENOUS at 07:19

## 2023-11-17 RX ADMIN — ONDANSETRON 4 MG: 2 INJECTION INTRAMUSCULAR; INTRAVENOUS at 07:25

## 2023-11-17 NOTE — OP NOTE
Operative report    Preop diagnosis: Right inguinal hernia  Postop diagnosis: Same    Operative procedure: Open mesh repair right inguinal hernia    Surgeon: KALINA Jones  Assistant: DILIP Del Valle  Anesthesia: IV sedation plus local infiltration of Marcaine and Beata relief    Indications for procedure: Patient presents with a symptomatic right inguinal hernia.  Full informed consent was obtain in clinic and reconfirmed in today's preoperative discussion.    Operative findings: Right inguinal indirect hernia.    Procedure: Patient brought to the operating room put under IV sedation.  Right inguinal region widely prepped and draped in usual sterile fashion.  Injected with Marcaine throughout for adequate anesthetic.  Skin incision along the inguinal crease and dissection carried down to superficial epigastric vessels which were tied off with Vicryl.  Dissection was then carried down to external oblique with local block placed out laterally with the Marcaine.  Sternal oblique opened to the external ring.  Cord was then isolated with a Penrose.  Hysterics were opened longitudinally and dissection carried down to the hernia sac which was readily dissected off of the cord structures.  The sac was then inverted with a pursestring of 3-0 Vicryl.  Initiated my routine mesh Ligia repair using a 15 x 10 cm pariah Holden ProGrip mesh.  Mesh was slid out laterally and passed around the cord per routine.  Secured per routine using 0 Vicryl to inguinal ligament.  Also to rectus medially.  The mesh was then passed between internal and external oblique out laterally where it was rolled out and was taken out of a flexed position at this point and the external bleak closed with a running Vicryl stitch.  Relief was then placed under and over the external oblique.  Closed in layers with Vicryl Monocryl skin.  Steri-Strips applied.    Estimated blood loss: Minimal  Pathology: None  The patient was taken to the recovery room where he was  without difficulty or apparent complication.

## 2023-11-17 NOTE — ANESTHESIA CARE TRANSFER NOTE
Patient: Saw H Jagdish    Procedure: Procedure(s):  HERNIORRHAPHY, RIGHT INGUINAL, OPEN       Diagnosis: Right inguinal hernia [K40.90]  Diagnosis Additional Information: No value filed.    Anesthesia Type:   No value filed.     Note:    Oropharynx: oropharynx clear of all foreign objects and spontaneously breathing  Level of Consciousness: awake  Oxygen Supplementation: room air    Independent Airway: airway patency satisfactory and stable  Dentition: dentition unchanged  Vital Signs Stable: post-procedure vital signs reviewed and stable  Report to RN Given: handoff report given  Patient transferred to: Phase II  Comments: Report to Phase II RN. Resps easy and regular.   Handoff Report: Identifed the Patient, Identified the Reponsible Provider, Reviewed the pertinent medical history, Discussed the surgical course, Reviewed Intra-OP anesthesia mangement and issues during anesthesia, Set expectations for post-procedure period and Allowed opportunity for questions and acknowledgement of understanding      Vitals:  Vitals Value Taken Time   /64 11/17/23 0817   Temp 36.3  C (97.3  F) 11/17/23 0817   Pulse 63 11/17/23 0817   Resp 16 11/17/23 0817   SpO2 98 % 11/17/23 0817       Electronically Signed By: JM ESCALANTE CRNA  November 17, 2023  8:23 AM

## 2023-11-17 NOTE — DISCHARGE INSTRUCTIONS
[facility]  Home Care Following Anesthesia  For 24 hours after surgery:  Get plenty of rest.  A responsible adult must stay with you for at least 24 hours after you leave the surgery center.  Do not drive or use heavy equipment.  If you have weakness or tingling, don't drive or use heavy equipment until this feeling goes away.   Do not drink alcohol.   Avoid strenuous or risky activities.  Ask for help when climbing stairs.  You may feel lightheaded.  IF so, sit for a few minutes before standing.  Have someone help you get up.   If you have nausea (feel sick to your stomach): Drink only clear liquids such as apple juice, ginger ale, broth or 7-Up.  Rest may also help.  Be sure to drink enough fluids.  Move to a regular diet as you feel able.   You may have a slight fever.  Call the doctor if your fever is over 100 F (37.7 C) (taken under the tongue) or lasts longer than 24 hours.  You may have a dry mouth, a sore throat, muscle aches or trouble sleeping. These should go away after 24 hours.  Do not make important or legal decisions.   It is recommended to avoid smoking.               Tips for taking pain medications  To get the best pain relief possible, remember these points:  Take pain medications as directed, before pain becomes severe.  Pain medication can upset your stomach: taking it with food may help.  Constipation is a common side effect of pain medication. Drink plenty of  fluids.  Eat foods high in fiber. Take a stool softener if recommended by your doctor or pharmacist.  Do not drink alcohol, drive or operate machinery while taking pain medications.  Ask about other ways to control pain, such as with heat, ice or relaxation.    Tylenol/Acetaminophen Consumption    If you feel your pain relief is insufficient, you may take Tylenol/Acetaminophen in addition to your narcotic pain medication.   Be careful not to exceed 4,000 mg of Tylenol/Acetaminophen in a 24 hour  period from all sources.  If you are taking extra strength Tylenol/acetaminophen (500 mg), the maximum dose is 8 tablets in 24 hours.  If you are taking regular strength acetaminophen (325 mg), the maximum dose is 12 tablets in 24 hours.    Call a doctor for any of the following:  Signs of infection (fever, growing tenderness at the surgery site, a large amount of drainage or bleeding, severe pain, foul-smelling drainage, redness, swelling).  It has been over 8 to 10 hours since surgery and you are still not able to urinate (pass water).  Headache for over 24 hours.  Numbness, tingling or weakness the day after surgery (if you had spinal anesthesia).  Signs of Covid-19 infection (temperature over 100 degrees, shortness of breath, cough, loss of taste/smell, generalized body aches, persistent headache, chills, sore throat, nausea/vomiting/diarrhea)  Your doctor is:  Dr. Gray Jones, General Surgery: [phone]                    Or dial [phone] and ask for the resident on call for:  General Surgery  For emergency care, call the:  [facility]:  [phone] (TTY for hearing impaired: [phone])

## 2023-11-17 NOTE — ANESTHESIA PREPROCEDURE EVALUATION
Anesthesia Pre-Procedure Evaluation    Patient: Manpreet Dubon   MRN: [id number] : 1955        Procedure : Procedure(s):  HERNIORRHAPHY, RIGHT INGUINAL, OPEN          Past Medical History:   Diagnosis Date    Anemia       History reviewed. No pertinent surgical history.   Allergies   Allergen Reactions    Nkda [No Known Drug Allergy]       Social History     Tobacco Use    Smoking status: Never    Smokeless tobacco: Never   Substance Use Topics    Alcohol use: No      Wt Readings from Last 1 Encounters:   23 56.2 kg (124 lb)        Anesthesia Evaluation            ROS/MED HX  ENT/Pulmonary:       Neurologic:     (+)    peripheral neuropathy,                            Cardiovascular:       METS/Exercise Tolerance:     Hematologic: Comments: Beta-thalasemia      Musculoskeletal:       GI/Hepatic:     (+) GERD,                   Renal/Genitourinary:       Endo:       Psychiatric/Substance Use:       Infectious Disease:       Malignancy:       Other:            Physical Exam    Airway  airway exam normal      Mallampati: II   TM distance: > 3 FB   Neck ROM: full   Mouth opening: > 3 cm    Respiratory Devices and Support         Dental       (+) Completely normal teeth      Cardiovascular          Rhythm and rate: regular and normal     Pulmonary   pulmonary exam normal        breath sounds clear to auscultation           OUTSIDE LABS:  CBC:   Lab Results   Component Value Date    WBC 15.1 (H) 2023    HGB 12.5 (L) 2023    HGB 12.2 (L) 2021    HCT 41.2 2023    HCT 40.1 2021     (H) 2023     BMP:   Lab Results   Component Value Date     2023     2023    POTASSIUM 4.7 2023    POTASSIUM 4.7 2023    CHLORIDE 104 2023    CHLORIDE 107 2023    CO2 26 2023    CO2 26 2023    BUN 13.5 2023    BUN 9.1 2023    CR 0.99 2023    CR 0.95 2023    GLC 85 2023    GLC 99 2023     COAGS: No  "results found for: \"PTT\", \"INR\", \"FIBR\"  POC: No results found for: \"BGM\", \"HCG\", \"HCGS\"  HEPATIC:   Lab Results   Component Value Date    ALBUMIN 4.1 11/08/2023    PROTTOTAL 7.3 11/08/2023    ALT 35 11/08/2023    AST 27 11/08/2023    ALKPHOS 89 11/08/2023    BILITOTAL 0.4 11/08/2023    BILIDIRECT 0.2 07/29/2019     OTHER:   Lab Results   Component Value Date    A1C 5.1 05/17/2021    JULIO 9.2 11/08/2023    TSH 0.88 04/26/2023       Anesthesia Plan    ASA Status:  1    NPO Status:  NPO Appropriate    Anesthesia Type: MAC.     - Reason for MAC: immobility needed, straight local not clinically adequate   Induction: Intravenous.   Maintenance: TIVA.        Consents    Anesthesia Plan(s) and associated risks, benefits, and realistic alternatives discussed. Questions answered and patient/representative(s) expressed understanding.     - Discussed:     - Discussed with:  Patient      - Extended Intubation/Ventilatory Support Discussed: No.      - Patient is DNR/DNI Status: No     Use of blood products discussed: No .     Postoperative Care    Pain management: IV analgesics, Oral pain medications, Multi-modal analgesia.   PONV prophylaxis: Ondansetron (or other 5HT-3), Background Propofol Infusion     Comments:                Kenndey Bryant MD  "

## 2023-11-17 NOTE — ANESTHESIA POSTPROCEDURE EVALUATION
Patient: Saw H Jagdish    Procedure: Procedure(s):  HERNIORRHAPHY, RIGHT INGUINAL, OPEN       Anesthesia Type:  No value filed.    Note:  Disposition: Outpatient   Postop Pain Control: Uneventful            Sign Out: Well controlled pain   PONV: No   Neuro/Psych: Uneventful            Sign Out: Acceptable/Baseline neuro status   Airway/Respiratory: Uneventful            Sign Out: Acceptable/Baseline resp. status   CV/Hemodynamics: Uneventful            Sign Out: Acceptable CV status   Other NRE: NONE   DID A NON-ROUTINE EVENT OCCUR? No           Last vitals:  Vitals Value Taken Time   /68 11/17/23 0905   Temp 36.3  C (97.4  F) 11/17/23 0905   Pulse 57 11/17/23 0905   Resp 16 11/17/23 0905   SpO2 98 % 11/17/23 0905       Electronically Signed By: Kennedy Bryant MD  November 17, 2023  2:01 PM

## 2023-11-20 ENCOUNTER — PATIENT OUTREACH (OUTPATIENT)
Dept: SURGERY | Facility: CLINIC | Age: 68
End: 2023-11-20
Payer: COMMERCIAL

## 2023-11-20 NOTE — PROGRESS NOTES
11/20/23    10:19 AM     Saw ILYA Dubon is a patient of Dr. Gray Jones that underwent open inguinal hernia repair approximately 3 days ago (11/17).  Attempted to contact patient via telephone for a status update and review post-op  teaching.  LM on VM via Meaghan  to call office.  Await return call.      Of note:  Wound:  Steri-strips  Follow-up:  Routine  Restrictions:  - No lifting, pushing, pulling more than 15-20 pounds for 3-4 weeks  New medications:  Norco, Senokot  Equipment/Supplies:  Athletic Supporter    ADDENDUM  11/21/23  9:25 AM    11/21/23    9:28 AM     Attempted to contact patient x 2 for post-op telephone call/status update.  LM on VM via Meaghan  to call office-contact information provided.

## (undated) DEVICE — DRAPE LAP W/ARMBOARD 29410

## (undated) DEVICE — PREP CHLORAPREP 26ML TINTED ORANGE  260815

## (undated) DEVICE — PACK MINOR CUSTOM ASC

## (undated) DEVICE — SU MONOCRYL 4-0 PS-2 27" UND Y426H

## (undated) DEVICE — SU VICRYL 2-0 CT-2 27" UND J269H

## (undated) DEVICE — GLOVE BIOGEL PI MICRO SZ 7.5 48575

## (undated) DEVICE — ESU GROUND PAD ADULT W/CORD E7507

## (undated) DEVICE — SYR 10ML FINGER CONTROL W/O NDL 309695

## (undated) DEVICE — DRSG STERI STRIP 1/2X4" R1547

## (undated) DEVICE — SU VICRYL 0 CT-1 27" UND J260H

## (undated) DEVICE — SU VICRYL 3-0 SH 27" UND J416H

## (undated) DEVICE — SOL NACL 0.9% IRRIG 500ML BOTTLE 2F7123

## (undated) DEVICE — DRAIN PENROSE 0.25"X18" LATEX FREE GR201

## (undated) DEVICE — SUPPORTER ATHLETIC LG LATEX 202636

## (undated) DEVICE — BLADE KNIFE SURG 10 371110

## (undated) DEVICE — LINEN ORTHO PACK 5446

## (undated) DEVICE — DRSG GAUZE 4X4" 3033

## (undated) RX ORDER — BUPIVACAINE HYDROCHLORIDE 2.5 MG/ML
INJECTION, SOLUTION EPIDURAL; INFILTRATION; INTRACAUDAL
Status: DISPENSED
Start: 2023-11-17

## (undated) RX ORDER — FENTANYL CITRATE 50 UG/ML
INJECTION, SOLUTION INTRAMUSCULAR; INTRAVENOUS
Status: DISPENSED
Start: 2023-11-17

## (undated) RX ORDER — PROPOFOL 10 MG/ML
INJECTION, EMULSION INTRAVENOUS
Status: DISPENSED
Start: 2023-11-17

## (undated) RX ORDER — ACETAMINOPHEN 325 MG/1
TABLET ORAL
Status: DISPENSED
Start: 2023-11-17

## (undated) RX ORDER — ONDANSETRON 2 MG/ML
INJECTION INTRAMUSCULAR; INTRAVENOUS
Status: DISPENSED
Start: 2023-11-17

## (undated) RX ORDER — OXYCODONE HYDROCHLORIDE 5 MG/1
TABLET ORAL
Status: DISPENSED
Start: 2023-11-17

## (undated) RX ORDER — CEFAZOLIN SODIUM 2 G/50ML
SOLUTION INTRAVENOUS
Status: DISPENSED
Start: 2023-11-17

## (undated) RX ORDER — DEXAMETHASONE SODIUM PHOSPHATE 4 MG/ML
INJECTION, SOLUTION INTRA-ARTICULAR; INTRALESIONAL; INTRAMUSCULAR; INTRAVENOUS; SOFT TISSUE
Status: DISPENSED
Start: 2023-11-17

## (undated) RX ORDER — KETOROLAC TROMETHAMINE 30 MG/ML
INJECTION, SOLUTION INTRAMUSCULAR; INTRAVENOUS
Status: DISPENSED
Start: 2023-11-17